# Patient Record
Sex: MALE | Race: WHITE | Employment: OTHER | URBAN - METROPOLITAN AREA
[De-identification: names, ages, dates, MRNs, and addresses within clinical notes are randomized per-mention and may not be internally consistent; named-entity substitution may affect disease eponyms.]

---

## 2017-08-01 ENCOUNTER — HOSPITAL ENCOUNTER (OUTPATIENT)
Dept: ULTRASOUND IMAGING | Age: 77
Discharge: HOME | End: 2017-08-01
Payer: MEDICARE

## 2017-08-01 DIAGNOSIS — Z13.6 SCREENING FOR AAA (ABDOMINAL AORTIC ANEURYSM): ICD-10-CM

## 2017-08-01 PROCEDURE — 76706 US ABDL AORTA SCREEN AAA: CPT

## 2017-08-04 ENCOUNTER — HOSPITAL ENCOUNTER (OUTPATIENT)
Dept: MRI IMAGING | Age: 77
Discharge: HOME | End: 2017-08-04
Attending: ORTHOPAEDIC SURGERY
Payer: MEDICARE

## 2017-08-04 DIAGNOSIS — M48.061 LUMBAR SPINAL STENOSIS: ICD-10-CM

## 2017-08-04 PROCEDURE — 72148 MRI LUMBAR SPINE W/O DYE: CPT

## 2017-08-16 ENCOUNTER — HOSPITAL ENCOUNTER (OUTPATIENT)
Dept: PREADMISSION TESTING | Age: 77
Discharge: HOME | End: 2017-08-16
Attending: ORTHOPAEDIC SURGERY
Payer: MEDICARE

## 2017-08-16 ENCOUNTER — HOSPITAL ENCOUNTER (OUTPATIENT)
Dept: CT IMAGING | Age: 77
Discharge: HOME | End: 2017-08-16
Attending: ORTHOPAEDIC SURGERY
Payer: MEDICARE

## 2017-08-16 VITALS
WEIGHT: 150 LBS | HEART RATE: 58 BPM | HEIGHT: 66 IN | TEMPERATURE: 97.9 F | BODY MASS INDEX: 24.11 KG/M2 | DIASTOLIC BLOOD PRESSURE: 78 MMHG | SYSTOLIC BLOOD PRESSURE: 152 MMHG

## 2017-08-16 DIAGNOSIS — M41.20 IDIOPATHIC SCOLIOSIS: ICD-10-CM

## 2017-08-16 LAB
ABO + RH BLD: NORMAL
ANION GAP SERPL CALC-SCNC: 11 MMOL/L (ref 5–15)
APPEARANCE UR: CLEAR
ATRIAL RATE: 54 BPM
BACTERIA URNS QL MICRO: NEGATIVE /HPF
BILIRUB UR QL: NEGATIVE
BLOOD GROUP ANTIBODIES SERPL: NORMAL
BUN SERPL-MCNC: 9 MG/DL (ref 6–20)
BUN/CREAT SERPL: 12 (ref 12–20)
CALCIUM SERPL-MCNC: 9.1 MG/DL (ref 8.5–10.1)
CALCULATED P AXIS, ECG09: 25 DEGREES
CALCULATED R AXIS, ECG10: 82 DEGREES
CALCULATED T AXIS, ECG11: 16 DEGREES
CHLORIDE SERPL-SCNC: 104 MMOL/L (ref 97–108)
CO2 SERPL-SCNC: 29 MMOL/L (ref 21–32)
COLOR UR: NORMAL
CREAT SERPL-MCNC: 0.78 MG/DL (ref 0.7–1.3)
DIAGNOSIS, 93000: NORMAL
EPITH CASTS URNS QL MICRO: NORMAL /LPF
ERYTHROCYTE [DISTWIDTH] IN BLOOD BY AUTOMATED COUNT: 13.7 % (ref 11.5–14.5)
EST. AVERAGE GLUCOSE BLD GHB EST-MCNC: 105 MG/DL
GLUCOSE SERPL-MCNC: 129 MG/DL (ref 65–100)
GLUCOSE UR STRIP.AUTO-MCNC: NEGATIVE MG/DL
HBA1C MFR BLD: 5.3 % (ref 4.2–6.3)
HCT VFR BLD AUTO: 44.5 % (ref 36.6–50.3)
HGB BLD-MCNC: 15.1 G/DL (ref 12.1–17)
HGB UR QL STRIP: NEGATIVE
HYALINE CASTS URNS QL MICRO: NORMAL /LPF (ref 0–5)
INR PPP: 1.2 (ref 0.9–1.1)
KETONES UR QL STRIP.AUTO: NEGATIVE MG/DL
LEUKOCYTE ESTERASE UR QL STRIP.AUTO: NEGATIVE
MCH RBC QN AUTO: 33.2 PG (ref 26–34)
MCHC RBC AUTO-ENTMCNC: 33.9 G/DL (ref 30–36.5)
MCV RBC AUTO: 97.8 FL (ref 80–99)
NITRITE UR QL STRIP.AUTO: NEGATIVE
P-R INTERVAL, ECG05: 132 MS
PH UR STRIP: 5.5 [PH] (ref 5–8)
PLATELET # BLD AUTO: 133 K/UL (ref 150–400)
POTASSIUM SERPL-SCNC: 3.8 MMOL/L (ref 3.5–5.1)
PROT UR STRIP-MCNC: NEGATIVE MG/DL
PROTHROMBIN TIME: 12.6 SEC (ref 9–11.1)
Q-T INTERVAL, ECG07: 464 MS
QRS DURATION, ECG06: 132 MS
QTC CALCULATION (BEZET), ECG08: 440 MS
RBC # BLD AUTO: 4.55 M/UL (ref 4.1–5.7)
RBC #/AREA URNS HPF: NORMAL /HPF (ref 0–5)
SODIUM SERPL-SCNC: 144 MMOL/L (ref 136–145)
SP GR UR REFRACTOMETRY: 1.02 (ref 1–1.03)
SPECIMEN EXP DATE BLD: NORMAL
UA: UC IF INDICATED,UAUC: NORMAL
UROBILINOGEN UR QL STRIP.AUTO: 1 EU/DL (ref 0.2–1)
VENTRICULAR RATE, ECG03: 54 BPM
WBC # BLD AUTO: 7.4 K/UL (ref 4.1–11.1)
WBC URNS QL MICRO: NORMAL /HPF (ref 0–4)

## 2017-08-16 PROCEDURE — 85027 COMPLETE CBC AUTOMATED: CPT | Performed by: ORTHOPAEDIC SURGERY

## 2017-08-16 PROCEDURE — 86900 BLOOD TYPING SEROLOGIC ABO: CPT | Performed by: ORTHOPAEDIC SURGERY

## 2017-08-16 PROCEDURE — 36415 COLL VENOUS BLD VENIPUNCTURE: CPT | Performed by: ORTHOPAEDIC SURGERY

## 2017-08-16 PROCEDURE — 72131 CT LUMBAR SPINE W/O DYE: CPT

## 2017-08-16 PROCEDURE — 85610 PROTHROMBIN TIME: CPT | Performed by: ORTHOPAEDIC SURGERY

## 2017-08-16 PROCEDURE — 80048 BASIC METABOLIC PNL TOTAL CA: CPT | Performed by: ORTHOPAEDIC SURGERY

## 2017-08-16 PROCEDURE — 81001 URINALYSIS AUTO W/SCOPE: CPT | Performed by: ORTHOPAEDIC SURGERY

## 2017-08-16 PROCEDURE — 83036 HEMOGLOBIN GLYCOSYLATED A1C: CPT | Performed by: ORTHOPAEDIC SURGERY

## 2017-08-16 PROCEDURE — 93005 ELECTROCARDIOGRAM TRACING: CPT

## 2017-08-16 RX ORDER — AMLODIPINE BESYLATE 5 MG/1
TABLET ORAL
Refills: 1 | COMMUNITY
Start: 2017-06-13

## 2017-08-16 RX ORDER — TRAMADOL HYDROCHLORIDE 50 MG/1
TABLET ORAL
Refills: 0 | COMMUNITY
Start: 2017-07-25 | End: 2017-08-27

## 2017-08-16 RX ORDER — IBUPROFEN 600 MG/1
TABLET ORAL
Refills: 0 | COMMUNITY
Start: 2017-07-07 | End: 2017-08-27

## 2017-08-16 NOTE — ADVANCED PRACTICE NURSE
Preoperative instructions reviewed with patient. Patient given 2-6 packs of CHG wipes. Instructions reviewed in class on use of CHG wipes. Patient given SSI infection FAQS sheet, as well as a  MRSA/MSSA treatment instruction sheet  With an explanation to patient that they will be notified if treatment instructions need to be initiated. Patient was given the opportunity to ask questions on the information provided.

## 2017-08-17 LAB
BACTERIA SPEC CULT: NORMAL
BACTERIA SPEC CULT: NORMAL
SERVICE CMNT-IMP: NORMAL

## 2017-08-22 ENCOUNTER — ANESTHESIA EVENT (OUTPATIENT)
Dept: SURGERY | Age: 77
DRG: 457 | End: 2017-08-22
Payer: MEDICARE

## 2017-08-23 ENCOUNTER — HOSPITAL ENCOUNTER (INPATIENT)
Age: 77
LOS: 4 days | Discharge: HOME HEALTH | DRG: 457 | End: 2017-08-27
Attending: ORTHOPAEDIC SURGERY | Admitting: ORTHOPAEDIC SURGERY
Payer: MEDICARE

## 2017-08-23 ENCOUNTER — ANESTHESIA (OUTPATIENT)
Dept: SURGERY | Age: 77
DRG: 457 | End: 2017-08-23
Payer: MEDICARE

## 2017-08-23 ENCOUNTER — APPOINTMENT (OUTPATIENT)
Dept: GENERAL RADIOLOGY | Age: 77
DRG: 457 | End: 2017-08-23
Attending: ORTHOPAEDIC SURGERY
Payer: MEDICARE

## 2017-08-23 PROBLEM — M41.9 SCOLIOSIS: Status: ACTIVE | Noted: 2017-08-23

## 2017-08-23 PROBLEM — M48.061 SPINAL STENOSIS OF LUMBAR REGION: Status: ACTIVE | Noted: 2017-08-23

## 2017-08-23 LAB
GLUCOSE BLD STRIP.AUTO-MCNC: 118 MG/DL (ref 65–100)
SERVICE CMNT-IMP: ABNORMAL

## 2017-08-23 PROCEDURE — 77030034094 HC GRFT BN SUB ELITE TRNTY MUSC -I1: Performed by: ORTHOPAEDIC SURGERY

## 2017-08-23 PROCEDURE — 77030010507 HC ADH SKN DERMBND J&J -B: Performed by: ORTHOPAEDIC SURGERY

## 2017-08-23 PROCEDURE — 77030003152 HC GRFT COLGN DURAL INLC -H: Performed by: ORTHOPAEDIC SURGERY

## 2017-08-23 PROCEDURE — 74011250636 HC RX REV CODE- 250/636: Performed by: ORTHOPAEDIC SURGERY

## 2017-08-23 PROCEDURE — 76210000016 HC OR PH I REC 1 TO 1.5 HR: Performed by: ORTHOPAEDIC SURGERY

## 2017-08-23 PROCEDURE — 76001 XR FLUOROSCOPY OVER 60 MINUTES: CPT

## 2017-08-23 PROCEDURE — 77030026133 HC BN CANC CHP CRSH LIFV -F: Performed by: ORTHOPAEDIC SURGERY

## 2017-08-23 PROCEDURE — 77030012406 HC DRN WND PENRS BARD -A: Performed by: ORTHOPAEDIC SURGERY

## 2017-08-23 PROCEDURE — 77030034475 HC MISC IMPL SPN: Performed by: ORTHOPAEDIC SURGERY

## 2017-08-23 PROCEDURE — 77030003029 HC SUT VCRL J&J -B: Performed by: ORTHOPAEDIC SURGERY

## 2017-08-23 PROCEDURE — 77030008684 HC TU ET CUF COVD -B: Performed by: ANESTHESIOLOGY

## 2017-08-23 PROCEDURE — 74011000272 HC RX REV CODE- 272: Performed by: ORTHOPAEDIC SURGERY

## 2017-08-23 PROCEDURE — 77030014647 HC SEAL FBRN TISSL BAXT -D: Performed by: ORTHOPAEDIC SURGERY

## 2017-08-23 PROCEDURE — 77030032828 HC GRFT DBM FBR VESUVUS 30ML K2M -I1: Performed by: ORTHOPAEDIC SURGERY

## 2017-08-23 PROCEDURE — 77030026438 HC STYL ET INTUB CARD -A: Performed by: ANESTHESIOLOGY

## 2017-08-23 PROCEDURE — 76060000043 HC ANESTHESIA 6 TO 6.5 HR: Performed by: ORTHOPAEDIC SURGERY

## 2017-08-23 PROCEDURE — 77030018836 HC SOL IRR NACL ICUM -A: Performed by: ORTHOPAEDIC SURGERY

## 2017-08-23 PROCEDURE — 77030020061 HC IV BLD WRMR ADMIN SET 3M -B: Performed by: ANESTHESIOLOGY

## 2017-08-23 PROCEDURE — 74011250636 HC RX REV CODE- 250/636

## 2017-08-23 PROCEDURE — 65270000029 HC RM PRIVATE

## 2017-08-23 PROCEDURE — 0SG1071 FUSION OF 2 OR MORE LUMBAR VERTEBRAL JOINTS WITH AUTOLOGOUS TISSUE SUBSTITUTE, POSTERIOR APPROACH, POSTERIOR COLUMN, OPEN APPROACH: ICD-10-PCS | Performed by: ORTHOPAEDIC SURGERY

## 2017-08-23 PROCEDURE — 76010000884 HC OR TIME 6 TO 6.5HR INTENSV - TIER 2: Performed by: ORTHOPAEDIC SURGERY

## 2017-08-23 PROCEDURE — 77030005518 HC CATH URETH FOL 2W BARD -B: Performed by: ORTHOPAEDIC SURGERY

## 2017-08-23 PROCEDURE — 77030013951 HC SEAL TISS ADH DURASL KT INLC -G1: Performed by: ORTHOPAEDIC SURGERY

## 2017-08-23 PROCEDURE — 77030031139 HC SUT VCRL2 J&J -A: Performed by: ORTHOPAEDIC SURGERY

## 2017-08-23 PROCEDURE — 77030004391 HC BUR FLUT MEDT -C: Performed by: ORTHOPAEDIC SURGERY

## 2017-08-23 PROCEDURE — 77030002924 HC SUT GORTX WLGO -B: Performed by: ORTHOPAEDIC SURGERY

## 2017-08-23 PROCEDURE — 30233N0 TRANSFUSION OF AUTOLOGOUS RED BLOOD CELLS INTO PERIPHERAL VEIN, PERCUTANEOUS APPROACH: ICD-10-PCS | Performed by: ORTHOPAEDIC SURGERY

## 2017-08-23 PROCEDURE — 77030011264 HC ELECTRD BLD EXT COVD -A: Performed by: ORTHOPAEDIC SURGERY

## 2017-08-23 PROCEDURE — 77030020782 HC GWN BAIR PAWS FLX 3M -B

## 2017-08-23 PROCEDURE — 07DR3ZZ EXTRACTION OF ILIAC BONE MARROW, PERCUTANEOUS APPROACH: ICD-10-PCS | Performed by: ORTHOPAEDIC SURGERY

## 2017-08-23 PROCEDURE — 82962 GLUCOSE BLOOD TEST: CPT

## 2017-08-23 PROCEDURE — 77030035339 HC CLMP RENSNCE KT DISP MAZR -G1: Performed by: ORTHOPAEDIC SURGERY

## 2017-08-23 PROCEDURE — 77030005529 HC CATH URETH FOL40 BARD -A: Performed by: ORTHOPAEDIC SURGERY

## 2017-08-23 PROCEDURE — 77030019908 HC STETH ESOPH SIMS -A: Performed by: ANESTHESIOLOGY

## 2017-08-23 PROCEDURE — 0SG00AJ FUSION OF LUMBAR VERTEBRAL JOINT WITH INTERBODY FUSION DEVICE, POSTERIOR APPROACH, ANTERIOR COLUMN, OPEN APPROACH: ICD-10-PCS | Performed by: ORTHOPAEDIC SURGERY

## 2017-08-23 PROCEDURE — P9045 ALBUMIN (HUMAN), 5%, 250 ML: HCPCS

## 2017-08-23 PROCEDURE — 72100 X-RAY EXAM L-S SPINE 2/3 VWS: CPT

## 2017-08-23 PROCEDURE — 77030012893

## 2017-08-23 PROCEDURE — 77030020268 HC MISC GENERAL SUPPLY: Performed by: ORTHOPAEDIC SURGERY

## 2017-08-23 PROCEDURE — 77030029099 HC BN WAX SSPC -A: Performed by: ORTHOPAEDIC SURGERY

## 2017-08-23 PROCEDURE — 77030002946 HC SUT NRLN J&J -B: Performed by: ORTHOPAEDIC SURGERY

## 2017-08-23 PROCEDURE — 77030002996 HC SUT SLK J&J -A: Performed by: ORTHOPAEDIC SURGERY

## 2017-08-23 PROCEDURE — 77030032490 HC SLV COMPR SCD KNE COVD -B: Performed by: ORTHOPAEDIC SURGERY

## 2017-08-23 PROCEDURE — C1713 ANCHOR/SCREW BN/BN,TIS/BN: HCPCS | Performed by: ORTHOPAEDIC SURGERY

## 2017-08-23 PROCEDURE — 74011000250 HC RX REV CODE- 250

## 2017-08-23 PROCEDURE — 77030035129: Performed by: ORTHOPAEDIC SURGERY

## 2017-08-23 PROCEDURE — 77030013079 HC BLNKT BAIR HGGR 3M -A: Performed by: ANESTHESIOLOGY

## 2017-08-23 PROCEDURE — 74011250636 HC RX REV CODE- 250/636: Performed by: ANESTHESIOLOGY

## 2017-08-23 PROCEDURE — 74011000250 HC RX REV CODE- 250: Performed by: ORTHOPAEDIC SURGERY

## 2017-08-23 PROCEDURE — 77030012035 HC APPL SEAL MICROMYST KT INLC -C: Performed by: ORTHOPAEDIC SURGERY

## 2017-08-23 PROCEDURE — 77030003666 HC NDL SPINAL BD -A: Performed by: ORTHOPAEDIC SURGERY

## 2017-08-23 PROCEDURE — 8E0W0CZ ROBOTIC ASSISTED PROCEDURE OF TRUNK REGION, OPEN APPROACH: ICD-10-PCS | Performed by: ORTHOPAEDIC SURGERY

## 2017-08-23 DEVICE — GRAFT BNE SUB 30CC DEMIN BNE MTRX FBR FOR OSTEOBIOLOGICAL: Type: IMPLANTABLE DEVICE | Site: SPINE LUMBAR | Status: FUNCTIONAL

## 2017-08-23 DEVICE — 5.5MM CURVED ROD 110MM TI ALLOY
Type: IMPLANTABLE DEVICE | Site: SPINE LUMBAR | Status: FUNCTIONAL
Brand: TAURUS

## 2017-08-23 DEVICE — GRAFT BNE SUB 20CC 2 4MM GRAN GROWTH FACT ALLGRFT OSTEOAMP: Type: IMPLANTABLE DEVICE | Site: SPINE LUMBAR | Status: FUNCTIONAL

## 2017-08-23 DEVICE — BONE CHIP CANC CRSH 1.7-10MM -- READICRAFT: Type: IMPLANTABLE DEVICE | Site: SPINE LUMBAR | Status: FUNCTIONAL

## 2017-08-23 DEVICE — DURAGEN® PLUS DURAL REGENERATION MATRIX, 2 IN X 2 IN (5 CM X 5 CM)
Type: IMPLANTABLE DEVICE | Site: SPINE LUMBAR | Status: FUNCTIONAL
Brand: DURAGEN® PLUS

## 2017-08-23 DEVICE — GRAFT BNE SUB M CANC FRZN MORSELIZED W/ VIABLE CELL TRINITY: Type: IMPLANTABLE DEVICE | Site: SPINE LUMBAR | Status: FUNCTIONAL

## 2017-08-23 RX ORDER — OXYCODONE HYDROCHLORIDE 5 MG/1
10 TABLET ORAL
Status: DISCONTINUED | OUTPATIENT
Start: 2017-08-23 | End: 2017-08-27 | Stop reason: HOSPADM

## 2017-08-23 RX ORDER — LABETALOL HYDROCHLORIDE 5 MG/ML
INJECTION, SOLUTION INTRAVENOUS AS NEEDED
Status: DISCONTINUED | OUTPATIENT
Start: 2017-08-23 | End: 2017-08-23 | Stop reason: HOSPADM

## 2017-08-23 RX ORDER — SUCCINYLCHOLINE CHLORIDE 20 MG/ML
INJECTION INTRAMUSCULAR; INTRAVENOUS AS NEEDED
Status: DISCONTINUED | OUTPATIENT
Start: 2017-08-23 | End: 2017-08-23 | Stop reason: HOSPADM

## 2017-08-23 RX ORDER — ACETAMINOPHEN 325 MG/1
650 TABLET ORAL EVERY 6 HOURS
Status: DISCONTINUED | OUTPATIENT
Start: 2017-08-23 | End: 2017-08-27 | Stop reason: HOSPADM

## 2017-08-23 RX ORDER — FACIAL-BODY WIPES
10 EACH TOPICAL DAILY PRN
Status: DISCONTINUED | OUTPATIENT
Start: 2017-08-25 | End: 2017-08-27 | Stop reason: HOSPADM

## 2017-08-23 RX ORDER — PROPOFOL 10 MG/ML
INJECTION, EMULSION INTRAVENOUS AS NEEDED
Status: DISCONTINUED | OUTPATIENT
Start: 2017-08-23 | End: 2017-08-23 | Stop reason: HOSPADM

## 2017-08-23 RX ORDER — DEXAMETHASONE SODIUM PHOSPHATE 4 MG/ML
INJECTION, SOLUTION INTRA-ARTICULAR; INTRALESIONAL; INTRAMUSCULAR; INTRAVENOUS; SOFT TISSUE AS NEEDED
Status: DISCONTINUED | OUTPATIENT
Start: 2017-08-23 | End: 2017-08-23 | Stop reason: HOSPADM

## 2017-08-23 RX ORDER — HYDRALAZINE HYDROCHLORIDE 20 MG/ML
INJECTION INTRAMUSCULAR; INTRAVENOUS AS NEEDED
Status: DISCONTINUED | OUTPATIENT
Start: 2017-08-23 | End: 2017-08-23 | Stop reason: HOSPADM

## 2017-08-23 RX ORDER — SODIUM CHLORIDE 0.9 % (FLUSH) 0.9 %
5-10 SYRINGE (ML) INJECTION EVERY 8 HOURS
Status: DISCONTINUED | OUTPATIENT
Start: 2017-08-23 | End: 2017-08-23 | Stop reason: HOSPADM

## 2017-08-23 RX ORDER — MIDAZOLAM HYDROCHLORIDE 1 MG/ML
1 INJECTION, SOLUTION INTRAMUSCULAR; INTRAVENOUS
Status: DISCONTINUED | OUTPATIENT
Start: 2017-08-23 | End: 2017-08-23 | Stop reason: HOSPADM

## 2017-08-23 RX ORDER — AMLODIPINE BESYLATE 5 MG/1
5 TABLET ORAL DAILY
Status: DISCONTINUED | OUTPATIENT
Start: 2017-08-24 | End: 2017-08-27 | Stop reason: HOSPADM

## 2017-08-23 RX ORDER — MIDAZOLAM HYDROCHLORIDE 1 MG/ML
1 INJECTION, SOLUTION INTRAMUSCULAR; INTRAVENOUS AS NEEDED
Status: DISCONTINUED | OUTPATIENT
Start: 2017-08-23 | End: 2017-08-23 | Stop reason: HOSPADM

## 2017-08-23 RX ORDER — SODIUM CHLORIDE 0.9 % (FLUSH) 0.9 %
5-10 SYRINGE (ML) INJECTION AS NEEDED
Status: DISCONTINUED | OUTPATIENT
Start: 2017-08-23 | End: 2017-08-27 | Stop reason: HOSPADM

## 2017-08-23 RX ORDER — MORPHINE SULFATE 10 MG/ML
2 INJECTION, SOLUTION INTRAMUSCULAR; INTRAVENOUS
Status: DISCONTINUED | OUTPATIENT
Start: 2017-08-23 | End: 2017-08-23 | Stop reason: HOSPADM

## 2017-08-23 RX ORDER — KETAMINE HYDROCHLORIDE 10 MG/ML
INJECTION, SOLUTION INTRAMUSCULAR; INTRAVENOUS AS NEEDED
Status: DISCONTINUED | OUTPATIENT
Start: 2017-08-23 | End: 2017-08-23 | Stop reason: HOSPADM

## 2017-08-23 RX ORDER — LIDOCAINE HYDROCHLORIDE 20 MG/ML
INJECTION, SOLUTION EPIDURAL; INFILTRATION; INTRACAUDAL; PERINEURAL AS NEEDED
Status: DISCONTINUED | OUTPATIENT
Start: 2017-08-23 | End: 2017-08-23 | Stop reason: HOSPADM

## 2017-08-23 RX ORDER — ONDANSETRON 2 MG/ML
4 INJECTION INTRAMUSCULAR; INTRAVENOUS
Status: ACTIVE | OUTPATIENT
Start: 2017-08-23 | End: 2017-08-24

## 2017-08-23 RX ORDER — SODIUM CHLORIDE 0.9 % (FLUSH) 0.9 %
5-10 SYRINGE (ML) INJECTION AS NEEDED
Status: DISCONTINUED | OUTPATIENT
Start: 2017-08-23 | End: 2017-08-23 | Stop reason: HOSPADM

## 2017-08-23 RX ORDER — HYDROMORPHONE HYDROCHLORIDE 1 MG/ML
INJECTION, SOLUTION INTRAMUSCULAR; INTRAVENOUS; SUBCUTANEOUS AS NEEDED
Status: DISCONTINUED | OUTPATIENT
Start: 2017-08-23 | End: 2017-08-23 | Stop reason: HOSPADM

## 2017-08-23 RX ORDER — SODIUM CHLORIDE 0.9 % (FLUSH) 0.9 %
5-10 SYRINGE (ML) INJECTION EVERY 8 HOURS
Status: DISCONTINUED | OUTPATIENT
Start: 2017-08-24 | End: 2017-08-27 | Stop reason: HOSPADM

## 2017-08-23 RX ORDER — ATENOLOL 50 MG/1
50 TABLET ORAL DAILY
Status: DISCONTINUED | OUTPATIENT
Start: 2017-08-24 | End: 2017-08-27 | Stop reason: HOSPADM

## 2017-08-23 RX ORDER — CEFAZOLIN SODIUM IN 0.9 % NACL 2 G/50 ML
2 INTRAVENOUS SOLUTION, PIGGYBACK (ML) INTRAVENOUS ONCE
Status: COMPLETED | OUTPATIENT
Start: 2017-08-23 | End: 2017-08-23

## 2017-08-23 RX ORDER — CYCLOBENZAPRINE HCL 10 MG
10 TABLET ORAL
Status: DISCONTINUED | OUTPATIENT
Start: 2017-08-23 | End: 2017-08-27 | Stop reason: HOSPADM

## 2017-08-23 RX ORDER — OXYCODONE HYDROCHLORIDE 5 MG/1
5 TABLET ORAL AS NEEDED
Status: DISCONTINUED | OUTPATIENT
Start: 2017-08-23 | End: 2017-08-23 | Stop reason: HOSPADM

## 2017-08-23 RX ORDER — HYDROMORPHONE HYDROCHLORIDE 1 MG/ML
0.2 INJECTION, SOLUTION INTRAMUSCULAR; INTRAVENOUS; SUBCUTANEOUS
Status: DISCONTINUED | OUTPATIENT
Start: 2017-08-23 | End: 2017-08-23 | Stop reason: HOSPADM

## 2017-08-23 RX ORDER — CEFAZOLIN SODIUM IN 0.9 % NACL 2 G/50 ML
2 INTRAVENOUS SOLUTION, PIGGYBACK (ML) INTRAVENOUS EVERY 8 HOURS
Status: COMPLETED | OUTPATIENT
Start: 2017-08-23 | End: 2017-08-24

## 2017-08-23 RX ORDER — OXYCODONE HYDROCHLORIDE 5 MG/1
5 TABLET ORAL
Status: DISCONTINUED | OUTPATIENT
Start: 2017-08-23 | End: 2017-08-27 | Stop reason: HOSPADM

## 2017-08-23 RX ORDER — MIDAZOLAM HYDROCHLORIDE 1 MG/ML
INJECTION, SOLUTION INTRAMUSCULAR; INTRAVENOUS AS NEEDED
Status: DISCONTINUED | OUTPATIENT
Start: 2017-08-23 | End: 2017-08-23 | Stop reason: HOSPADM

## 2017-08-23 RX ORDER — DEXTROSE, SODIUM CHLORIDE, SODIUM LACTATE, POTASSIUM CHLORIDE, AND CALCIUM CHLORIDE 5; .6; .31; .03; .02 G/100ML; G/100ML; G/100ML; G/100ML; G/100ML
125 INJECTION, SOLUTION INTRAVENOUS CONTINUOUS
Status: DISCONTINUED | OUTPATIENT
Start: 2017-08-23 | End: 2017-08-23 | Stop reason: HOSPADM

## 2017-08-23 RX ORDER — SODIUM CHLORIDE 9 MG/ML
125 INJECTION, SOLUTION INTRAVENOUS CONTINUOUS
Status: DISPENSED | OUTPATIENT
Start: 2017-08-23 | End: 2017-08-24

## 2017-08-23 RX ORDER — BUPIVACAINE HYDROCHLORIDE 5 MG/ML
INJECTION, SOLUTION EPIDURAL; INTRACAUDAL AS NEEDED
Status: DISCONTINUED | OUTPATIENT
Start: 2017-08-23 | End: 2017-08-23 | Stop reason: HOSPADM

## 2017-08-23 RX ORDER — SODIUM CHLORIDE, SODIUM LACTATE, POTASSIUM CHLORIDE, CALCIUM CHLORIDE 600; 310; 30; 20 MG/100ML; MG/100ML; MG/100ML; MG/100ML
125 INJECTION, SOLUTION INTRAVENOUS CONTINUOUS
Status: DISCONTINUED | OUTPATIENT
Start: 2017-08-23 | End: 2017-08-23 | Stop reason: HOSPADM

## 2017-08-23 RX ORDER — AMOXICILLIN 250 MG
1 CAPSULE ORAL 2 TIMES DAILY
Status: DISCONTINUED | OUTPATIENT
Start: 2017-08-23 | End: 2017-08-27 | Stop reason: HOSPADM

## 2017-08-23 RX ORDER — NALOXONE HYDROCHLORIDE 0.4 MG/ML
0.4 INJECTION, SOLUTION INTRAMUSCULAR; INTRAVENOUS; SUBCUTANEOUS AS NEEDED
Status: DISCONTINUED | OUTPATIENT
Start: 2017-08-23 | End: 2017-08-27 | Stop reason: HOSPADM

## 2017-08-23 RX ORDER — LIDOCAINE HYDROCHLORIDE 10 MG/ML
0.5 INJECTION, SOLUTION EPIDURAL; INFILTRATION; INTRACAUDAL; PERINEURAL AS NEEDED
Status: DISCONTINUED | OUTPATIENT
Start: 2017-08-23 | End: 2017-08-23 | Stop reason: HOSPADM

## 2017-08-23 RX ORDER — PRAVASTATIN SODIUM 40 MG/1
40 TABLET ORAL
Status: DISCONTINUED | OUTPATIENT
Start: 2017-08-23 | End: 2017-08-27 | Stop reason: HOSPADM

## 2017-08-23 RX ORDER — ONDANSETRON 2 MG/ML
INJECTION INTRAMUSCULAR; INTRAVENOUS AS NEEDED
Status: DISCONTINUED | OUTPATIENT
Start: 2017-08-23 | End: 2017-08-23 | Stop reason: HOSPADM

## 2017-08-23 RX ORDER — DIPHENHYDRAMINE HYDROCHLORIDE 50 MG/ML
12.5 INJECTION, SOLUTION INTRAMUSCULAR; INTRAVENOUS AS NEEDED
Status: DISCONTINUED | OUTPATIENT
Start: 2017-08-23 | End: 2017-08-23 | Stop reason: HOSPADM

## 2017-08-23 RX ORDER — SODIUM CHLORIDE 9 MG/ML
INJECTION, SOLUTION INTRAVENOUS
Status: DISCONTINUED | OUTPATIENT
Start: 2017-08-23 | End: 2017-08-23 | Stop reason: HOSPADM

## 2017-08-23 RX ORDER — PROPOFOL 10 MG/ML
INJECTION, EMULSION INTRAVENOUS
Status: DISCONTINUED | OUTPATIENT
Start: 2017-08-23 | End: 2017-08-23 | Stop reason: HOSPADM

## 2017-08-23 RX ORDER — DIPHENHYDRAMINE HCL 12.5MG/5ML
12.5 ELIXIR ORAL
Status: ACTIVE | OUTPATIENT
Start: 2017-08-23 | End: 2017-08-24

## 2017-08-23 RX ORDER — GLYCOPYRROLATE 0.2 MG/ML
INJECTION INTRAMUSCULAR; INTRAVENOUS AS NEEDED
Status: DISCONTINUED | OUTPATIENT
Start: 2017-08-23 | End: 2017-08-23 | Stop reason: HOSPADM

## 2017-08-23 RX ORDER — FENTANYL CITRATE 50 UG/ML
INJECTION, SOLUTION INTRAMUSCULAR; INTRAVENOUS AS NEEDED
Status: DISCONTINUED | OUTPATIENT
Start: 2017-08-23 | End: 2017-08-23 | Stop reason: HOSPADM

## 2017-08-23 RX ORDER — ALBUMIN HUMAN 50 G/1000ML
SOLUTION INTRAVENOUS AS NEEDED
Status: DISCONTINUED | OUTPATIENT
Start: 2017-08-23 | End: 2017-08-23 | Stop reason: HOSPADM

## 2017-08-23 RX ORDER — POLYETHYLENE GLYCOL 3350 17 G/17G
17 POWDER, FOR SOLUTION ORAL DAILY
Status: DISCONTINUED | OUTPATIENT
Start: 2017-08-24 | End: 2017-08-27 | Stop reason: HOSPADM

## 2017-08-23 RX ORDER — SODIUM CHLORIDE, SODIUM LACTATE, POTASSIUM CHLORIDE, CALCIUM CHLORIDE 600; 310; 30; 20 MG/100ML; MG/100ML; MG/100ML; MG/100ML
INJECTION, SOLUTION INTRAVENOUS
Status: DISCONTINUED | OUTPATIENT
Start: 2017-08-23 | End: 2017-08-23 | Stop reason: HOSPADM

## 2017-08-23 RX ORDER — HYDROMORPHONE HCL IN 0.9% NACL 15 MG/30ML
PATIENT CONTROLLED ANALGESIA VIAL INTRAVENOUS CONTINUOUS
Status: DISCONTINUED | OUTPATIENT
Start: 2017-08-23 | End: 2017-08-24

## 2017-08-23 RX ORDER — FENTANYL CITRATE 50 UG/ML
25 INJECTION, SOLUTION INTRAMUSCULAR; INTRAVENOUS
Status: DISCONTINUED | OUTPATIENT
Start: 2017-08-23 | End: 2017-08-23 | Stop reason: HOSPADM

## 2017-08-23 RX ORDER — ROCURONIUM BROMIDE 10 MG/ML
INJECTION, SOLUTION INTRAVENOUS AS NEEDED
Status: DISCONTINUED | OUTPATIENT
Start: 2017-08-23 | End: 2017-08-23 | Stop reason: HOSPADM

## 2017-08-23 RX ORDER — FENTANYL CITRATE 50 UG/ML
50 INJECTION, SOLUTION INTRAMUSCULAR; INTRAVENOUS AS NEEDED
Status: DISCONTINUED | OUTPATIENT
Start: 2017-08-23 | End: 2017-08-23 | Stop reason: HOSPADM

## 2017-08-23 RX ORDER — ONDANSETRON 2 MG/ML
4 INJECTION INTRAMUSCULAR; INTRAVENOUS AS NEEDED
Status: DISCONTINUED | OUTPATIENT
Start: 2017-08-23 | End: 2017-08-23 | Stop reason: HOSPADM

## 2017-08-23 RX ADMIN — HYDROMORPHONE HYDROCHLORIDE 0.5 MG: 1 INJECTION, SOLUTION INTRAMUSCULAR; INTRAVENOUS; SUBCUTANEOUS at 13:55

## 2017-08-23 RX ADMIN — PROPOFOL 50 MG: 10 INJECTION, EMULSION INTRAVENOUS at 11:30

## 2017-08-23 RX ADMIN — SUCCINYLCHOLINE CHLORIDE 140 MG: 20 INJECTION INTRAMUSCULAR; INTRAVENOUS at 11:08

## 2017-08-23 RX ADMIN — KETAMINE HYDROCHLORIDE 25 MG: 10 INJECTION, SOLUTION INTRAMUSCULAR; INTRAVENOUS at 16:00

## 2017-08-23 RX ADMIN — Medication: at 17:55

## 2017-08-23 RX ADMIN — HYDROMORPHONE HYDROCHLORIDE 0.25 MG: 1 INJECTION, SOLUTION INTRAMUSCULAR; INTRAVENOUS; SUBCUTANEOUS at 16:00

## 2017-08-23 RX ADMIN — FENTANYL CITRATE 50 MCG: 50 INJECTION, SOLUTION INTRAMUSCULAR; INTRAVENOUS at 11:15

## 2017-08-23 RX ADMIN — HYDROMORPHONE HYDROCHLORIDE 1 MG: 1 INJECTION, SOLUTION INTRAMUSCULAR; INTRAVENOUS; SUBCUTANEOUS at 12:03

## 2017-08-23 RX ADMIN — SODIUM CHLORIDE 125 ML/HR: 900 INJECTION, SOLUTION INTRAVENOUS at 17:50

## 2017-08-23 RX ADMIN — SODIUM CHLORIDE: 9 INJECTION, SOLUTION INTRAVENOUS at 13:55

## 2017-08-23 RX ADMIN — PROPOFOL 130 MG: 10 INJECTION, EMULSION INTRAVENOUS at 11:08

## 2017-08-23 RX ADMIN — LIDOCAINE HYDROCHLORIDE 40 MG: 20 INJECTION, SOLUTION EPIDURAL; INFILTRATION; INTRACAUDAL; PERINEURAL at 11:07

## 2017-08-23 RX ADMIN — DEXAMETHASONE SODIUM PHOSPHATE 4 MG: 4 INJECTION, SOLUTION INTRA-ARTICULAR; INTRALESIONAL; INTRAMUSCULAR; INTRAVENOUS; SOFT TISSUE at 11:17

## 2017-08-23 RX ADMIN — HYDRALAZINE HYDROCHLORIDE 10 MG: 20 INJECTION INTRAMUSCULAR; INTRAVENOUS at 14:31

## 2017-08-23 RX ADMIN — PROPOFOL 50 MCG/KG/MIN: 10 INJECTION, EMULSION INTRAVENOUS at 11:35

## 2017-08-23 RX ADMIN — SODIUM CHLORIDE, SODIUM LACTATE, POTASSIUM CHLORIDE, AND CALCIUM CHLORIDE 125 ML/HR: 600; 310; 30; 20 INJECTION, SOLUTION INTRAVENOUS at 10:38

## 2017-08-23 RX ADMIN — CEFAZOLIN 2 G: 1 INJECTION, POWDER, FOR SOLUTION INTRAMUSCULAR; INTRAVENOUS; PARENTERAL at 14:54

## 2017-08-23 RX ADMIN — SODIUM CHLORIDE, SODIUM LACTATE, POTASSIUM CHLORIDE, CALCIUM CHLORIDE: 600; 310; 30; 20 INJECTION, SOLUTION INTRAVENOUS at 12:55

## 2017-08-23 RX ADMIN — ALBUMIN HUMAN 250 ML: 50 SOLUTION INTRAVENOUS at 16:08

## 2017-08-23 RX ADMIN — FENTANYL CITRATE 50 MCG: 50 INJECTION, SOLUTION INTRAMUSCULAR; INTRAVENOUS at 10:59

## 2017-08-23 RX ADMIN — CEFAZOLIN 2 G: 1 INJECTION, POWDER, FOR SOLUTION INTRAMUSCULAR; INTRAVENOUS; PARENTERAL at 11:15

## 2017-08-23 RX ADMIN — CEFAZOLIN 2 G: 1 INJECTION, POWDER, FOR SOLUTION INTRAMUSCULAR; INTRAVENOUS; PARENTERAL at 23:42

## 2017-08-23 RX ADMIN — HYDROMORPHONE HYDROCHLORIDE 0.5 MG: 1 INJECTION, SOLUTION INTRAMUSCULAR; INTRAVENOUS; SUBCUTANEOUS at 13:45

## 2017-08-23 RX ADMIN — SODIUM CHLORIDE: 9 INJECTION, SOLUTION INTRAVENOUS at 11:33

## 2017-08-23 RX ADMIN — GLYCOPYRROLATE 0.1 MG: 0.2 INJECTION INTRAMUSCULAR; INTRAVENOUS at 11:53

## 2017-08-23 RX ADMIN — GLYCOPYRROLATE 0.1 MG: 0.2 INJECTION INTRAMUSCULAR; INTRAVENOUS at 11:56

## 2017-08-23 RX ADMIN — MIDAZOLAM HYDROCHLORIDE 2 MG: 1 INJECTION, SOLUTION INTRAMUSCULAR; INTRAVENOUS at 10:59

## 2017-08-23 RX ADMIN — FENTANYL CITRATE 50 MCG: 50 INJECTION, SOLUTION INTRAMUSCULAR; INTRAVENOUS at 11:07

## 2017-08-23 RX ADMIN — ROCURONIUM BROMIDE 5 MG: 10 INJECTION, SOLUTION INTRAVENOUS at 11:08

## 2017-08-23 RX ADMIN — ONDANSETRON 4 MG: 2 INJECTION INTRAMUSCULAR; INTRAVENOUS at 11:17

## 2017-08-23 RX ADMIN — SODIUM CHLORIDE 125 ML/HR: 900 INJECTION, SOLUTION INTRAVENOUS at 22:38

## 2017-08-23 RX ADMIN — FENTANYL CITRATE 50 MCG: 50 INJECTION, SOLUTION INTRAMUSCULAR; INTRAVENOUS at 11:30

## 2017-08-23 RX ADMIN — FENTANYL CITRATE 50 MCG: 50 INJECTION, SOLUTION INTRAMUSCULAR; INTRAVENOUS at 14:51

## 2017-08-23 RX ADMIN — LABETALOL HYDROCHLORIDE 10 MG: 5 INJECTION, SOLUTION INTRAVENOUS at 14:13

## 2017-08-23 RX ADMIN — SODIUM CHLORIDE, SODIUM LACTATE, POTASSIUM CHLORIDE, CALCIUM CHLORIDE: 600; 310; 30; 20 INJECTION, SOLUTION INTRAVENOUS at 10:59

## 2017-08-23 RX ADMIN — PROPOFOL: 10 INJECTION, EMULSION INTRAVENOUS at 13:45

## 2017-08-23 NOTE — IP AVS SNAPSHOT
[address] 
[phone] Patient: [employer] MRN: [id number] PPP:72/20/8803 Current Discharge Medication List  
  
START taking these medications Dose & Instructions Dispensing Information Comments Morning Noon Evening Bedtime HYDROcodone-acetaminophen 5-325 mg per tablet Commonly known as:  Unruly Andrewsist Your last dose was: Your next dose is:    
   
   
 Dose:  1 Tab Take 1 Tab by mouth every four (4) hours as needed for Pain. Max Daily Amount: 6 Tabs. Quantity:  40 Tab Refills:  0 CONTINUE these medications which have CHANGED Dose & Instructions Dispensing Information Comments Morning Noon Evening Bedtime  
 traMADol 50 mg tablet Commonly known as:  ULTRAM  
What changed:  See the new instructions. Your last dose was: Your next dose is:    
   
   
 Dose:   mg Take 1-2 Tabs by mouth every six (6) hours as needed. Max Daily Amount: 400 mg. Quantity:  50 Tab Refills:  0 CONTINUE these medications which have NOT CHANGED Dose & Instructions Dispensing Information Comments Morning Noon Evening Bedtime  
 amLODIPine 5 mg tablet Commonly known as:  Achilles Richardson Your last dose was: Your next dose is: TAKE 1 TABLET BY MOUTH EVERY DAY Refills:  1  
     
   
   
   
  
 atenolol 50 mg tablet Commonly known as:  TENORMIN Your last dose was: Your next dose is:    
   
   
 Dose:  50 mg Take 50 mg by mouth daily. Refills:  0  
     
   
   
   
  
 pravastatin 40 mg tablet Commonly known as:  PRAVACHOL Your last dose was: Your next dose is:    
   
   
 Dose:  40 mg Take 40 mg by mouth nightly. Refills:  0 STOP taking these medications   
 aspirin delayed-release 81 mg tablet  
   
  
 ibuprofen 600 mg tablet Commonly known as:  MOTRIN Where to Get Your Medications Information on where to get these meds will be given to you by the nurse or doctor. ! Ask your nurse or doctor about these medications HYDROcodone-acetaminophen 5-325 mg per tablet  
 traMADol 50 mg tablet

## 2017-08-23 NOTE — PERIOP NOTES
TRANSFER - OUT REPORT:    Verbal report given to Sejal(name) on [employer]  being transferred to [facility](unit) for routine post - op       Report consisted of patients Situation, Background, Assessment and   Recommendations(SBAR). Time Pre op antibiotic given:1115, 1296  Anesthesia Stop time: 8106  Mccracken Present on Transfer to floor:yes  Order for Mccracken on Chart:yes    Information from the following report(s) Procedure Summary and Accordion was reviewed with the receiving nurse. Opportunity for questions and clarification was provided. Is the patient on 02? YES              Other 100% NRB    Is the patient on a monitor? NO    Is the nurse transporting with the patient? NO    Surgical Waiting Area notified of patient's transfer from PACU?  NO      The following personal items collected during your admission accompanied patient upon transfer:   Dental Appliance: Dental Appliances: None  Vision:    Hearing Aid:    Jewelry:    Clothing: Clothing:  (clothes and back brace to pacu)  Other Valuables:    Valuables sent to safe:    Clothes and back brace returned to pt

## 2017-08-23 NOTE — PERIOP NOTES
Spoke with patients family for surgical update. Informed family that I had tried to contact them twice prior. Family stated the volunteers at the surgical waiting desk had the wrong beeper number for family.

## 2017-08-23 NOTE — IP AVS SNAPSHOT
[address] 
[phone] Patient: Magali Dunlap Libboo MRN: [id number] ASTER:16/49/0[birth year] You are allergic to the following No active allergies Recent Documentation Height Weight BMI Smoking Status 1.676 m 68 kg 24.21 kg/m2 Current Some Day Smoker Emergency Contacts Name Discharge Info Relation Home Work Mobile 75 Irvin Lozano CAREGIVER [3] Spouse [3] [phone] About your hospitalization You were admitted on:  August 23, 2017 You last received care in the:  [address] [place] [address] You were discharged on:  August 27, 2017 Unit phone number:  [phone] Why you were hospitalized Your primary diagnosis was:  Not on File Your diagnoses also included:  Spinal Stenosis Of Lumbar Region, Scoliosis Providers Seen During Your Hospitalizations Provider Role Specialty Primary office phone Rehan Wagoner MD Attending Provider Orthopedic Surgery [phone] Your Primary Care Physician (PCP) Primary Care Physician Office Phone Office Fax Nicolasa Damon [phone] Follow-up Information Follow up With Details Comments Contact Info AT [facility] [id number] 
[phone] Markel Gtz, [address] [place] [address] [address] [place] [address] 
[phone] Current Discharge Medication List  
  
START taking these medications Dose & Instructions Dispensing Information Comments Morning Noon Evening Bedtime HYDROcodone-acetaminophen 5-325 mg per tablet Commonly known as:  Rhenda Gasper Your last dose was: Your next dose is:    
   
   
 Dose:  1 Tab Take 1 Tab by mouth every four (4) hours as needed for Pain. Max Daily Amount: 6 Tabs. Quantity:  40 Tab Refills:  0 CONTINUE these medications which have CHANGED Dose & Instructions Dispensing Information Comments Morning Noon Evening Bedtime  
 traMADol 50 mg tablet Commonly known as:  ULTRAM  
What changed:  See the new instructions. Your last dose was: Your next dose is:    
   
   
 Dose:   mg Take 1-2 Tabs by mouth every six (6) hours as needed. Max Daily Amount: 400 mg. Quantity:  50 Tab Refills:  0 CONTINUE these medications which have NOT CHANGED Dose & Instructions Dispensing Information Comments Morning Noon Evening Bedtime  
 amLODIPine 5 mg tablet Commonly known as:  Jessica Chimes Your last dose was: Your next dose is: TAKE 1 TABLET BY MOUTH EVERY DAY Refills:  1  
     
   
   
   
  
 atenolol 50 mg tablet Commonly known as:  TENORMIN Your last dose was: Your next dose is:    
   
   
 Dose:  50 mg Take 50 mg by mouth daily. Refills:  0  
     
   
   
   
  
 pravastatin 40 mg tablet Commonly known as:  PRAVACHOL Your last dose was: Your next dose is:    
   
   
 Dose:  40 mg Take 40 mg by mouth nightly. Refills:  0 STOP taking these medications   
 aspirin delayed-release 81 mg tablet  
   
  
 ibuprofen 600 mg tablet Commonly known as:  MOTRIN Where to Get Your Medications Information on where to get these meds will be given to you by the nurse or doctor. ! Ask your nurse or doctor about these medications HYDROcodone-acetaminophen 5-325 mg per tablet  
 traMADol 50 mg tablet Discharge Instructions Patient meets criteria for BUNDLED PAYMENT  
for Care Improvement Initiative Criteria Contact Information for Orthopedic Nurse Navigator:     
SANTO Rodriguez, RN-BC 
U:[phone] I:[phone] E:[phone] After Hospital Care Plan:  Discharge Instructions Lumbar Fusion Surgery Dr. Steffanie Aguirre Patient Name: [facility] SAHNIKA Date of procedure: 8/23/2017 Date of discharge:  
 
Procedure: Procedure(s): 
ROBOTIC ASSISTED L1-L5 DECOMPRESSION FUSION (MAZOR)  (OXYGEN)  PCP: Beacher Canavan, MD 
 
 
Follow up appointments 
-follow up with Dr. Steffanie Aguirre in 2 weeks. Call [phone] to make an appointment as soon as you get home from the hospital. 
 
When to call your Orthopaedic Surgeon: 
-Signs of infection-if your incision is red; continues to have drainage; drainage has a foul odor or if you have a persistent fever over 101 degrees for 24 hours 
-Nausea or vomiting, severe headache 
-Loss of bowel or bladder function, inability to urinate 
-Changes in sensation in your arms or legs (numbness, tingling, loss of color) -Increased weakness-greater than before your surgery 
-Severe pain or pain not relieved by medications 
-Signs of a blood clot in your leg-calf pain, tenderness, redness, swelling of lower leg When to call your Primary Care Physician: 
-Concerns about medical conditions such as diabetes, high blood pressure, asthma, congestive heart failure 
-Call if blood sugars are elevated, persistent headache or dizziness, coughing or congestion, constipation or diarrhea, burning with urination, abnormal heart rate When to call 139 and go to the nearest emergency room: 
-Acute onset of chest pain, shortness of breath, difficulty breathing Activity 
-You are going home a well person, be as active as possible. Your only exercise should be walking. Start with short frequent walks and increase your walking distance each day. 
-Limit the amount of time you sit to 20-30 minute intervals. Sitting for prolonged periods of time will be uncomfortable for you following surgery. 
-Do NOT lift anything over 5 pounds 
-Do NOT do any straining, twisting or bending 
-When you are in bed, you may lay on your back or on either side. Do NOT lie on your stomach Brace -If you have a back brace, you should wear your brace at all times when you are out of bed. Do not wear the brace while in bed or showering. 
-Remember to always wear a cotton t-shirt underneath your brace. 
-Do not bend or twist when your brace is off Diet 
-Resume usual diet; drink plenty of fluids; eat foods high in fiber 
-It is important to have regular bowel movements. Pain medications may cause constipation. You may want to take a stool softener (such as Senokot-S or Colace) to prevent constipation. 
 -If constipation occurs, take a laxative (such as Dulcolax tablets, Milk of Magnesia, or a suppository). Laxatives should only be used if the above preventable measures have failed and you still have not had a bowel movement after three days Driving 
-You may not drive or return to work until instructed by your physician. However, you may ride in the car for short periods of time. Incision Care 
-You may take brief showers but do not run the water run directly onto the wound. After showering or bathing, remove the wet dressing and gently blot the wound dry with a soft towel. 
-Do not rub or apply any lotions or ointments to your incision site.  
-Do not soak or scrub your wound 
-Keep a dry dressing (ABD and paper tape) on your incision and have it changed daily for 14 days after surgery; more often if your incision is draining. Have your caregiver wash their hands thoroughly before changing your dressing. 
-You will have absorbable sutures and steristrips (white tape) on your incision. Leave the steristrips on until they fall off. Showering 
-You may shower in approximately 4 days after your surgery.   
-Leave the dressing on during your shower. Do NOT allow the water to run directly onto your dressing. Once you get out of the shower, put on a dry dressing. 
-Reminder- your brace can be removed while showering. Remember to not bend or twist while your brace is off. -Do not take a tub bath. Preventing blood clots 
-You have been given T.E.D. stockings to wear. Continue to wear these for 7 days after your discharge. Put them on in the morning and take them off at night.   
-They are used to increase your circulation and prevent blood clots from forming in your legs 
-T. E.D. stockings can be machine washed, temperature not to exceed 160° F (71°C) and machine dried for 15 to 20 minutes, temperature not to exceed 250° F (121°C). Pain management 
-Take pain medication as prescribed; decrease the amount you use as your pain lessens 
-DO not wait until you are in extreme pain to take your medication. 
-Avoid alcoholic beverages while taking pain medication Pain Medication Safety DO: 
-Read the Medication Guide  
-Take your medicine exactly as prescribed  
-Store your medicine away from children and in a safe place  
-Flush unused medicine down the toilet  
-Call your healthcare provider for medical advice about side effects. You may report side effects to FDA at 1-878-FDA-7520.  
-Please be aware that many medications contain Tylenol. We do not want you to over medicate so please read the information below as a guide. Do not take more than 4 Grams of Tylenol in a 24 hour period. (There are 1000 milligrams in one Gram) Percocet contains 325 mg of Tylenol per tablet (do not take more than 12 tablets in 24 hours) Lortab contains 500 mg of Tylenol per tablet (do not take more than 8 tablets in 24 hours) Norco contains 325 mg of Tylenol per tablet (do not take more than 12 tablets in 24 hours).  
DO NOT: 
 -Do not give your medicine to others  
-Do not take medicine unless it was prescribed for you  
-Do not stop taking your medicine without talking to your healthcare provider  
-Do not break, chew, crush, dissolve, or inject your medicine. If you cannot swallow your medicine whole, talk to your healthcare provider. 
-Do not drink alcohol while taking this medicine 
-Do not take anti-inflammatory medications or aspirin unless instructed by your      Physician. Discharge Orders None Introducing [facility] [place] [facility] SERVICES! Jarocho Rubi introduces [employer] patient portal. Now you can access parts of your medical record, email your doctor's office, and request medication refills online. 1. In your internet browser, go to https://[employer] 2. Click on the First Time User? Click Here link in the Sign In box. You will see the New Member Sign Up page. 3. Enter your [employer] Access Code exactly as it appears below. You will not need to use this code after youve completed the sign-up process. If you do not sign up before the expiration date, you must request a new code. · [employer] Access Code: [email or web address] Expires: 10/25/2017  9:52 AM 
 
4. Enter the last four digits of your Social Security Number (xxxx) and Date of Birth (mm/dd/yyyy) as indicated and click Submit. You will be taken to the next sign-up page. 5. Create a [employer] ID. This will be your [employer] login ID and cannot be changed, so think of one that is secure and easy to remember. 6. Create a [employer] password. You can change your password at any time. 7. Enter your Password Reset Question and Answer. This can be used at a later time if you forget your password. 8. Enter your e-mail address. You will receive e-mail notification when new information is available in [address] 9. Click Sign Up. You can now view and download portions of your medical record. 10. Click the Download Summary menu link to download a portable copy of your medical information. If you have questions, please visit the Frequently Asked Questions section of the [employer] website. Remember, MyChart is NOT to be used for urgent needs. For medical emergencies, dial 911. Now available from your iPhone and Android! General Information Please provide this summary of care documentation to your next provider. Patient Signature:  ____________________________________________________________ Date:  ____________________________________________________________  
  
Mereta Mince Provider Signature:  ____________________________________________________________ Date:  ____________________________________________________________

## 2017-08-23 NOTE — BRIEF OP NOTE
BRIEF OPERATIVE NOTE    Date of Procedure: 8/23/2017   Preoperative Diagnosis: SCOLIOSIS   LUMBAR STENOSIS   Postoperative Diagnosis: SCOLIOSIS   LUMBAR STENOSIS     Procedure(s):  ROBOTIC ASSISTED L1-L5 DECOMPRESSION FUSION (MAZOR)  (OXYGEN)  Surgeon(s) and Role: Flaca Valdez MD - Primary         Assistant Staff:  Nurse Practitioner: Мария Grossman NP    Surgical Staff:  Circ-1: Aman Mckeon RN  Circ-Relief: Derek Linares RN  Radiology Technician: Brea Durham RT  Scrub RN-1: Eileen Webster RN  Scrub RN-Relief: Shanita Aguilera RN  Nurse Practitioner: Мария Grossman NP  Event Time In   Incision Start 1141   Incision Close      Anesthesia: General   Estimated Blood Loss: 750cc  Specimens: * No specimens in log *   Findings: stenosis   Complications: bleb in dura  Implants:   Implant Name Type Inv.  Item Serial No.  Lot No. LRB No. Used Action   BONE CHIP CANC [address] 1.7-10MM -- [employer]FT - [id number]  BONE CHIP CANC [address] 1.7-10MM -- READICRAFT [id number] [facility] T[facility] NA N/A 1 Implanted   GRAFT BNE [employer] --  - [id number]  GRAFT BNE [employer] --  [id number] MUSCULOSKELETAL TRANS NA N/A 1 Implanted   GRAFT FIBER DEMINERALIZED 30ML -- VESUVIUS FIBERS - [id number]  GRAFT FIBER DEMINERALIZED 30ML -- VESUVIUS FIBERS [id number] [facility] NA N/A 1 Implanted   GRAFT DURA REGEN MATRX 2X2 5PK -- DURAGEN PLUS ORDER EA 5/EA - SNA  GRAFT DURA REGEN MATRX 2X2 5PK -- DURAGEN PLUS ORDER EA 5/EA NA [employer] MAURO [id number] N/A 1 Implanted   6.5X45 SCREW [employer] Screw  NA [employer] US SPINE NA N/A 1 Implanted   5.5X45 SCREW [employer] Screw  NA [employer] SPINE NA N/A 1 Implanted   5.5X50 SCREW [employer] Screw  NA [employer] US SPINE NA N/A 3 Implanted   6.5X50 [employer] SCREW Screw  NA [employer] US SPINE NA N/A 5 Implanted   AMEDICA REDUCTION HEAD SCREWS Screw  NA [employer] US SPINE NA N/A 10 Implanted   AMEDICA SET SCREWS Screw  NA [employer] US SPINE NA N/A 10 Implanted   VI NM [id number] LORDOTIC IMPLANT Other  NA SEASPINE [id number] N/A 1 Implanted   GRAFT BNE GRAN DBM 2-4MM 20ML -- OSTEOAMP - RUKS--0030  GRAFT BNE GRAN DBM 2-4MM 20ML -- OSTEOAMP HEZ--0030 [employer] NA N/A 1 Implanted   JAMES SPNE CRV [id number] --  - SNA   JAMES SPNE CRV [id number] --  NA [employer] US SPINE NA N/A 2 Implanted

## 2017-08-23 NOTE — ANESTHESIA PREPROCEDURE EVALUATION
Anesthetic History   No history of anesthetic complications            Review of Systems / Medical History  Patient summary reviewed, nursing notes reviewed and pertinent labs reviewed    Pulmonary  Within defined limits                 Neuro/Psych   Within defined limits           Cardiovascular    Hypertension          Past MI, CAD and cardiac stents         GI/Hepatic/Renal  Within defined limits              Endo/Other  Within defined limits           Other Findings              Physical Exam    Airway  Mallampati: II  TM Distance: > 6 cm  Neck ROM: normal range of motion   Mouth opening: Normal     Cardiovascular  Regular rate and rhythm,  S1 and S2 normal,  no murmur, click, rub, or gallop             Dental  No notable dental hx       Pulmonary  Breath sounds clear to auscultation               Abdominal  GI exam deferred       Other Findings            Anesthetic Plan    ASA: 2  Anesthesia type: general          Induction: Intravenous  Anesthetic plan and risks discussed with: Patient

## 2017-08-23 NOTE — ANESTHESIA POSTPROCEDURE EVALUATION
Post-Anesthesia Evaluation and Assessment    Patient: Paris Sinclair MRN: [id number]  SSN: [id number]    YOB: 1940  Age: 78 y.o. Sex: male       Cardiovascular Function/Vital Signs  Visit Vitals    /60    Pulse 70    Temp 36.8 °C (98.3 °F)    Resp 10    Ht 5' 6\" (1.676 m)    Wt 68 kg (150 lb)    SpO2 98%    BMI 24.21 kg/m2       Patient is status post general anesthesia for Procedure(s):  ROBOTIC ASSISTED L1-L5 DECOMPRESSION FUSION (MAZOR)  (OXYGEN). Nausea/Vomiting: None    Postoperative hydration reviewed and adequate. Pain:  Pain Scale 1: Numeric (0 - 10) (08/23/17 1816)  Pain Intensity 1: 2 (08/23/17 1816)   Managed    Neurological Status:   Neuro [facility]):  (no numbness or tingling in either leg) (08/23/17 1816)  Neuro  LLE Motor Response: Moderate; Purposeful (good flex and extention) (08/23/17 1816)  RLE Motor Response: Moderate; Purposeful (good flex and extention) (08/23/17 1816)   At baseline    Mental Status and Level of Consciousness: Arousable    Pulmonary Status:   O2 Device: Non-rebreather mask (08/23/17 1835)   Adequate oxygenation and airway patent    Complications related to anesthesia: None    Post-anesthesia assessment completed.  No concerns    Signed By: Mylene Bourne MD     August 23, 2017

## 2017-08-24 LAB
ANION GAP SERPL CALC-SCNC: 8 MMOL/L (ref 5–15)
BUN SERPL-MCNC: 7 MG/DL (ref 6–20)
BUN/CREAT SERPL: 9 (ref 12–20)
CALCIUM SERPL-MCNC: 7.4 MG/DL (ref 8.5–10.1)
CHLORIDE SERPL-SCNC: 109 MMOL/L (ref 97–108)
CO2 SERPL-SCNC: 21 MMOL/L (ref 21–32)
CREAT SERPL-MCNC: 0.77 MG/DL (ref 0.7–1.3)
GLUCOSE SERPL-MCNC: 123 MG/DL (ref 65–100)
HGB BLD-MCNC: 10 G/DL (ref 12.1–17)
POTASSIUM SERPL-SCNC: 3.8 MMOL/L (ref 3.5–5.1)
SODIUM SERPL-SCNC: 138 MMOL/L (ref 136–145)

## 2017-08-24 PROCEDURE — 74011250636 HC RX REV CODE- 250/636: Performed by: ORTHOPAEDIC SURGERY

## 2017-08-24 PROCEDURE — 85018 HEMOGLOBIN: CPT | Performed by: ORTHOPAEDIC SURGERY

## 2017-08-24 PROCEDURE — 97530 THERAPEUTIC ACTIVITIES: CPT

## 2017-08-24 PROCEDURE — 77010033678 HC OXYGEN DAILY

## 2017-08-24 PROCEDURE — G8987 SELF CARE CURRENT STATUS: HCPCS

## 2017-08-24 PROCEDURE — 65270000029 HC RM PRIVATE

## 2017-08-24 PROCEDURE — 97161 PT EVAL LOW COMPLEX 20 MIN: CPT

## 2017-08-24 PROCEDURE — 77030032490 HC SLV COMPR SCD KNE COVD -B

## 2017-08-24 PROCEDURE — 97116 GAIT TRAINING THERAPY: CPT

## 2017-08-24 PROCEDURE — 74011250637 HC RX REV CODE- 250/637: Performed by: ORTHOPAEDIC SURGERY

## 2017-08-24 PROCEDURE — 80048 BASIC METABOLIC PNL TOTAL CA: CPT | Performed by: ORTHOPAEDIC SURGERY

## 2017-08-24 PROCEDURE — G8978 MOBILITY CURRENT STATUS: HCPCS

## 2017-08-24 PROCEDURE — 36415 COLL VENOUS BLD VENIPUNCTURE: CPT | Performed by: ORTHOPAEDIC SURGERY

## 2017-08-24 PROCEDURE — G8988 SELF CARE GOAL STATUS: HCPCS

## 2017-08-24 PROCEDURE — G8979 MOBILITY GOAL STATUS: HCPCS

## 2017-08-24 PROCEDURE — 97165 OT EVAL LOW COMPLEX 30 MIN: CPT

## 2017-08-24 PROCEDURE — 74011250637 HC RX REV CODE- 250/637: Performed by: NURSE PRACTITIONER

## 2017-08-24 RX ORDER — TRAMADOL HYDROCHLORIDE 50 MG/1
50 TABLET ORAL
Status: DISCONTINUED | OUTPATIENT
Start: 2017-08-24 | End: 2017-08-27 | Stop reason: HOSPADM

## 2017-08-24 RX ORDER — ONDANSETRON 2 MG/ML
INJECTION INTRAMUSCULAR; INTRAVENOUS
Status: DISPENSED
Start: 2017-08-24 | End: 2017-08-25

## 2017-08-24 RX ORDER — TRAMADOL HYDROCHLORIDE 50 MG/1
50-100 TABLET ORAL
Qty: 50 TAB | Refills: 0 | Status: SHIPPED | OUTPATIENT
Start: 2017-08-24

## 2017-08-24 RX ADMIN — OXYCODONE HYDROCHLORIDE 10 MG: 5 TABLET ORAL at 15:46

## 2017-08-24 RX ADMIN — TRAMADOL HYDROCHLORIDE 50 MG: 50 TABLET, FILM COATED ORAL at 19:13

## 2017-08-24 RX ADMIN — DOCUSATE SODIUM AND SENNOSIDES 1 TABLET: 8.6; 5 TABLET, FILM COATED ORAL at 19:13

## 2017-08-24 RX ADMIN — ACETAMINOPHEN 650 MG: 325 TABLET, FILM COATED ORAL at 03:37

## 2017-08-24 RX ADMIN — AMLODIPINE BESYLATE 5 MG: 5 TABLET ORAL at 09:55

## 2017-08-24 RX ADMIN — POLYETHYLENE GLYCOL 3350 17 G: 17 POWDER, FOR SOLUTION ORAL at 09:54

## 2017-08-24 RX ADMIN — DOCUSATE SODIUM AND SENNOSIDES 1 TABLET: 8.6; 5 TABLET, FILM COATED ORAL at 09:55

## 2017-08-24 RX ADMIN — Medication 10 ML: at 21:00

## 2017-08-24 RX ADMIN — TRAMADOL HYDROCHLORIDE 50 MG: 50 TABLET, FILM COATED ORAL at 10:17

## 2017-08-24 RX ADMIN — ATENOLOL 50 MG: 50 TABLET ORAL at 09:55

## 2017-08-24 RX ADMIN — PRAVASTATIN SODIUM 40 MG: 40 TABLET ORAL at 21:00

## 2017-08-24 RX ADMIN — ACETAMINOPHEN 650 MG: 325 TABLET, FILM COATED ORAL at 19:13

## 2017-08-24 RX ADMIN — CEFAZOLIN 2 G: 1 INJECTION, POWDER, FOR SOLUTION INTRAMUSCULAR; INTRAVENOUS; PARENTERAL at 07:00

## 2017-08-24 RX ADMIN — SODIUM CHLORIDE 125 ML/HR: 900 INJECTION, SOLUTION INTRAVENOUS at 16:11

## 2017-08-24 RX ADMIN — ACETAMINOPHEN 650 MG: 325 TABLET, FILM COATED ORAL at 08:00

## 2017-08-24 RX ADMIN — SODIUM CHLORIDE 125 ML/HR: 900 INJECTION, SOLUTION INTRAVENOUS at 08:04

## 2017-08-24 RX ADMIN — OXYCODONE HYDROCHLORIDE 5 MG: 5 TABLET ORAL at 12:29

## 2017-08-24 RX ADMIN — ACETAMINOPHEN 650 MG: 325 TABLET, FILM COATED ORAL at 14:17

## 2017-08-24 NOTE — PROGRESS NOTES
Physical Therapy  8.24.17    12:00-- F/u with patient to attempt AM PT evaluation. Patient had just returned to bed with nursing assist after sitting in chair for 30 min (transferred with OT). Patient requesting pain medication, RN aware. RN who assisted patient back to bed stated patient is \"very unsteady. \" F/u later for PT session. Order received, chart reviewed. Spoke with OT and RN, RN requesting therapy f/u in ~1 hr as they are working on raising the [facility] at this time. F/u later this AM. Thank you.     Sherren Converse, PT, DPT

## 2017-08-24 NOTE — PROGRESS NOTES
Found patient standing at side of bed unattended. Drain and come apart with large amount of blood on floor. Patient oriented to name and place but still appeared confused. Cleaned and returned to bed. Confusion new. ?due to earlier dose of oxycodone. Gave Tramadol 25mg po for c/o incisional pain.

## 2017-08-24 NOTE — PROGRESS NOTES
Problem: Mobility Impaired (Adult and Pediatric)  Goal: *Acute Goals and Plan of Care (Insert Text)  Physical Therapy Goals  Initiated 8/24/2017    1. Patient will move from supine to sit and sit to supine , scoot up and down and roll side to side in bed with modified independence within 4 days. 2. Patient will perform sit to stand with supervision/set-up within 4 days. 3. Patient will ambulate with supervision/set-up for 150 feet with the least restrictive device within 4 days. 4. Patient will ascend/descend 4 stairs with right handrail(s) with supervision/set-up within 4 days. 5. Patient will verbalize and demonstrate understanding of spinal precautions (No bending, lifting greater than 5 lbs, or twisting; log-roll technique; frequent repositioning as instructed) within 4 days. 6. Patient will improve Tinetti score by 4-5 points within 7 days. PHYSICAL THERAPY EVALUATION  Patient: Nedra Soriano (73 y.o. male)  Date: 8/24/2017  Primary Diagnosis: COLIOSIS  LUMBAR STENOSIS   Spinal stenosis of lumbar region  Scoliosis  Procedure(s) (LRB):  ROBOTIC ASSISTED L1-L5 DECOMPRESSION FUSION (MAZOR)  (OXYGEN) (N/A) 1 Day Post-Op   Precautions:   Fall, Back (TLSO)      ASSESSMENT :  Based on the objective data described below, the patient presents with impaired balance, R LE weakness, high distractibility, increased anxiety with activity, high fall risk (Tinetti score 2/28), and overall decline from baseline following spinal surgery. Patient received sidelying in bed, initially attempting to defer therapy due to pain, however with education and encourage from PT and daughter, patient agreeable. Overall min-mod A for functional mobility and max verbal cues for safety with log roll, transfers, and gait. Patient independent at baseline, but today required min-mod A for steps along EOB without AD.  Noted R LE buckling during short distance gait, and unsafe to advance gait further today without AD (however did not appreciate any significant R LE weakness on MMT). Very distracted throughout activity and discussion about d/c plan, which contributes to his impaired safety awareness. At this time, patient may need inpatient rehab if unable to progress enough for d/c home safely with HHPT and assist from wife. Will attempt gait trial with RW tomorrow. Patient will benefit from skilled intervention to address the above impairments. Patients rehabilitation potential is considered to be Good  Factors which may influence rehabilitation potential include:   [ ]         None noted  [ ]         Mental ability/status  [ ]         Medical condition  [ ]         Home/family situation and support systems  [ ]         Safety awareness  [X]         Pain tolerance/management  [ ]         Other:        PLAN :  Recommendations and Planned Interventions:  [X]           Bed Mobility Training             [X]    Neuromuscular Re-Education  [X]           Transfer Training                   [X]    Orthotic/Prosthetic Training  [X]           Gait Training                         [ ]    Modalities  [X]           Therapeutic Exercises           [ ]    Edema Management/Control  [X]           Therapeutic Activities            [X]    Patient and Family Training/Education  [ ]           Other (comment):     Frequency/Duration: Patient will be followed by physical therapy  twice daily to address goals. Discharge Recommendations: Home Health vs Inpatient Rehab  Further Equipment Recommendations for Discharge: TBD       SUBJECTIVE:   Patient stated No I'll just stay here five days.  when discussing rehab option at d/c pending progress      OBJECTIVE DATA SUMMARY:   HISTORY:    Past Medical History:   Diagnosis Date    Arthritis      CAD (coronary artery disease)       5 STENTS,  3 INITIAL 1997    Fatty liver       >40 YEARS    Heart failure ([facility] 2000     ACUTE POST HIP REPLACEMENT     Hypertension      MI (myocardial infarction) ([facility]       2000    Scoliosis       Past Surgical History:   Procedure Laterality Date    CARDIAC SURG PROCEDURE UNLIST         stent placement    HX CATARACT REMOVAL Bilateral      HX HEART CATHETERIZATION   1997    HX HERNIA REPAIR   1980     INGUINAL    HX PROSTATECTOMY   2007     NO CANCER, ELEVATED PSA    HX ROTATOR CUFF REPAIR Left 2010    HX TONSILLECTOMY        REVISE TOTAL HIP REPLACEMENT Left 2004    TOTAL HIP ARTHROPLASTY Left 2000     Prior Level of Function/Home Situation: Patient lives with wife in [facility] with 4 steps to enter. Independent at baseline. Personal factors and/or comorbidities impacting plan of care: PMH, safety awareness, pain management     Home Situation  Home Environment: Apartment ([facility])  # Steps to Enter: 4  One/Two Story Residence: One story  Living Alone: No  Support Systems: Spouse/Significant Other/Partner  Patient Expects to be Discharged to[de-identified] Apartment  Current DME Used/Available at Home: Raised toilet seat, Grab bars, Brace/Splint (shower seat)  Tub or Shower Type: Shower     EXAMINATION/PRESENTATION/DECISION MAKING:   Critical Behavior:  Neurologic State: Alert  Orientation Level: Oriented X4  Cognition: Follows commands  Hearing: Auditory  Auditory Impairment: None  Range Of Motion:  AROM: Generally decreased, functional  Strength:    Strength: Generally decreased, functional (functionally weaker R>L LE; fairly symmetrical on exam)  Tone & Sensation:   Tone: Normal  Sensation:  (denies numbness/tingling)  Coordination:  Coordination: Generally decreased, functional (impaired by his distractability/anxiety)  Functional Mobility:  Bed Mobility:  Rolling: Minimum assistance;Assist x1 (+ max verbal cues)  Supine to Sit: Minimum assistance;Assist x2  Scooting: Supervision  Transfers:  Sit to Stand: Minimum assistance;Assist x1;Additional time  Stand to Sit: Minimum assistance;Assist x1;Additional time  Bed to Chair: Minimum assistance  Balance:   Sitting: Intact; Without support  Standing: Impaired; Without support  Standing - Static: Fair  Standing - Dynamic : Poor  Ambulation/Gait Training:  Distance (ft): 8 Feet (ft)  Assistive Device: Gait belt (HHA x1)  Ambulation - Level of Assistance: Minimal assistance; Moderate assistance  Gait Abnormalities: Decreased step clearance; Antalgic; Path deviations;Trunk sway increased; Step to gait (R knee buckling during gait along bedside)  Base of Support: Widened;Center of gravity altered  Stance: Right decreased; Left increased  Speed/Yuko: Slow;Shuffled  Step Length: Right shortened;Left shortened     Functional Measure:  Tinetti test:      Sitting Balance: 1  Arises: 0  Attempts to Rise: 0  Immediate Standing Balance: 0  Standing Balance: 0  Nudged: 0  Eyes Closed: 0  Turn 360 Degrees - Continuous/Discontinuous: 0  Turn 360 Degrees - Steady/Unsteady: 0  Sitting Down: 1  Balance Score: 2  Indication of Gait: 0  R Step Length/Height: 0  L Step Length/Height: 0  R Foot Clearance: 0  L Foot Clearance: 0  Step Symmetry: 0  Step Continuity: 0  Path: 0  Trunk: 0  Walking Time: 0  Gait Score: 0  Total Score: 2         Tinetti Test and G-code impairment scale:  Percentage of Impairment CH     0%    CI     1-19% CJ     20-39% CK     40-59% CL     60-79% CM     80-99% CN      100%   Tinetti  Score 0-28 28 23-27 17-22 12-16 6-11 1-5 0          Tinetti Tool Score Risk of Falls  <19 = High Fall Risk  19-24 = Moderate Fall Risk  25-28 = Low Fall Risk  Tinetti ME. Performance-Oriented Assessment of Mobility Problems in Elderly Patients. Meyers 68; [id number]. (Scoring Description: PT Bulletin Feb. 10, 1993)     Older adults: Meghana Dutton et al, 2009; n = 1000 [facility] elderly evaluated with ABC, BENNIE, ADL, and IADL)  · Mean BENNIE score for males aged 69-68 years = 26.21(3.40)  · Mean BENNIE score for females age 69-68 years = 25.16(4.30)  · Mean BENNIE score for males over 80 years = 23.29(6.02)  · Mean BENNIE score for females over 80 years = 17.20(8.32)            G codes:   In compliance with CMSs Claims Based Outcome Reporting, the following G-code set was chosen for this patient based on their primary functional limitation being treated: The outcome measure chosen to determine the severity of the functional limitation was the Tinetti with a score of 2/28 which was correlated with the impairment scale. · Mobility - Walking and Moving Around:               - CURRENT STATUS:    CM - 80%-99% impaired, limited or restricted               - GOAL STATUS:           CL - 60%-79% impaired, limited or restricted               - D/C STATUS:                       ---------------To be determined---------------      Physical Therapy Evaluation Charge Determination   History Examination Presentation Decision-Making   HIGH Complexity :3+ comorbidities / personal factors will impact the outcome/ POC  MEDIUM Complexity : 3 Standardized tests and measures addressing body structure, function, activity limitation and / or participation in recreation  LOW Complexity : Stable, uncomplicated  Other outcome measures Tinetti 2/28  HIGH       Based on the above components, the patient evaluation is determined to be of the following complexity level: LOW      Pain:  Pain Scale 1: Numeric (0 - 10)  Pain Intensity 1: 7  Pain Location 1: Back  Pain Orientation 1: Lower  Pain Description 1: Sharp  Pain Intervention(s) 1: Medication (see MAR)  Activity Tolerance:   Good despite pain  Please refer to the flowsheet for vital signs taken during this treatment.   After treatment:   [X]         Patient left in no apparent distress sitting up in chair  [ ]         Patient left in no apparent distress in bed  [X]         Call bell left within reach  [X]         Nursing notified  [X]         Caregiver present  [ ]         Bed alarm activated      COMMUNICATION/EDUCATION:   The patients plan of care was discussed with: Registered Nurse,  and NP.  [X]         Fall prevention education was provided and the patient/caregiver indicated understanding. [X]         Patient/family have participated as able in goal setting and plan of care. [X]         Patient/family agree to work toward stated goals and plan of care. [ ]         Patient understands intent and goals of therapy, but is neutral about his/her participation. [ ]         Patient is unable to participate in goal setting and plan of care.      Thank you for this referral.  Brad Patel, PT, DPT   Time Calculation: 33 mins

## 2017-08-24 NOTE — PROGRESS NOTES
Rounded on Buddhist patients and provided Anointing of the Sick at request of patient    Fr. Allne Mari

## 2017-08-24 NOTE — PROGRESS NOTES
Orthopedic Spine Progress Note  Post Op day: 1 Day Post-Op    2017 7:39 AM     Nimesh Cardenas    Vital Signs:    Patient Vitals for the past 8 hrs:   BP Temp Pulse Resp SpO2   17 0338 115/67 98.8 °F (37.1 °C) 86 15 100 %   17 0118 112/69 99.7 °F (37.6 °C) 87 15 100 %     Temp (24hrs), Av.5 °F (36.9 °C), Min:97.9 °F (36.6 °C), Max:99.7 °F (37.6 °C)      Intake/Output:      1901 -  0700  In: 4450 [I.V.:4200]  Out: 8141 [Urine:835; Drains:200]    Pain Control:   Pain Assessment  Pain Scale 1: Numeric (0 - 10)  Pain Intensity 1: 3  Pain Onset 1: postop  Pain Location 1: Back  Pain Orientation 1: Posterior  Pain Description 1: Aching  Pain Intervention(s) 1: Encouraged PCA    LAB:    Recent Labs      17   0628   HGB  10.0*     Lab Results   Component Value Date/Time    Sodium 138 2017 06:28 AM    Potassium 3.8 2017 06:28 AM    Chloride 109 2017 06:28 AM    CO2 21 2017 06:28 AM    Glucose 123 2017 06:28 AM    BUN 7 2017 06:28 AM    Creatinine 0.77 2017 06:28 AM    Calcium 7.4 2017 06:28 AM       Subjective:  Nimesh Alvarenga is a 78 y.o. male s/p a  Procedure(s):  ROBOTIC ASSISTED L1-L5 DECOMPRESSION FUSION (MAZOR)  (OXYGEN)   Procedure(s):  ROBOTIC ASSISTED L1-L5 DECOMPRESSION FUSION (MAZOR)  (OXYGEN). Tolerating diet. Objective: General: alert, cooperative, no distress. Gastrointestinal:  Soft, non-tender. Neurological: Neurovascular exam within normal limits. Sensation stable. Motor: unchanged C5-T1 and L2-S1. Musculoskeletal:  Kleber's sign negative in bilateral lower extremities. Calves soft, supple, non-tender upon palpation or with passive stretch. Skin: Incision - clean, dry and intact. No significant erythema or swelling.     Dressing: clean, dry, and intact     PT/OT:   Gait:                      Assessment:    s/p Procedure(s):  ROBOTIC ASSISTED L1-L5 DECOMPRESSION FUSION (MAZOR)  (OXYGEN)    Active Problems: Spinal stenosis of lumbar region (2017)      Scoliosis (2017)         Plan:     1. Continue PT/OT  2. Continue established methods of pain control  3. VTE Prophylaxes - TEDS &/or SCDs              Discharge To: Signed By: Linda Pressley MD    Orthopedic Spine Progress Note  Post Op day: 1 Day Post-Op    2017 7:39 AM     Nimesh Cardenas    Vital Signs:    Patient Vitals for the past 8 hrs:   BP Temp Pulse Resp SpO2   17 0338 115/67 98.8 °F (37.1 °C) 86 15 100 %   17 0118 112/69 99.7 °F (37.6 °C) 87 15 100 %     Temp (24hrs), Av.5 °F (36.9 °C), Min:97.9 °F (36.6 °C), Max:99.7 °F (37.6 °C)      Intake/Output:      1901 -  0700  In: 4450 [I.V.:4200]  Out: 9293 [Urine:835; Drains:200]    Pain Control:   Pain Assessment  Pain Scale 1: Numeric (0 - 10)  Pain Intensity 1: 3  Pain Onset 1: postop  Pain Location 1: Back  Pain Orientation 1: Posterior  Pain Description 1: Aching  Pain Intervention(s) 1: Encouraged PCA    LAB:    Recent Labs      17   0628   HGB  10.0*     Lab Results   Component Value Date/Time    Sodium 138 2017 06:28 AM    Potassium 3.8 2017 06:28 AM    Chloride 109 2017 06:28 AM    CO2 21 2017 06:28 AM    Glucose 123 2017 06:28 AM    BUN 7 2017 06:28 AM    Creatinine 0.77 2017 06:28 AM    Calcium 7.4 2017 06:28 AM       Subjective:  Nimesh Joy is a 78 y.o. male s/p a  Procedure(s):  ROBOTIC ASSISTED L1-L5 DECOMPRESSION FUSION (MAZOR)  (OXYGEN)   Procedure(s):  ROBOTIC ASSISTED L1-L5 DECOMPRESSION FUSION (MAZOR)  (OXYGEN). Tolerating diet. Objective: General: alert, cooperative, no distress. Gastrointestinal:  Soft, non-tender. Neurological: Neurovascular exam within normal limits. Sensation stable. Motor: unchanged C5-T1 and L2-S1. Musculoskeletal:  Kleber's sign negative in bilateral lower extremities. Calves soft, supple, non-tender upon palpation or with passive stretch.     Skin: Incision - clean, dry and intact. No significant erythema or swelling. Dressing: clean, dry, and intact     PT/OT:   Gait:                      Assessment:    s/p Procedure(s):  ROBOTIC ASSISTED L1-L5 DECOMPRESSION FUSION (MAZOR)  (OXYGEN)    Active Problems:    Spinal stenosis of lumbar region (8/23/2017)      Scoliosis (8/23/2017)         Plan:     1. Continue PT/OT  2. Continue established methods of pain control  3. VTE Prophylaxes - TEDS &/or SCDs              Discharge To:       Signed By: Zachary Boland MD

## 2017-08-24 NOTE — PROGRESS NOTES
Orthopedic Spine Progress Note  Elena Marion, AGACNP-BC  Work Cell: [phone]    Post Op Day: 1 Day Post-Op    August 24, 2017 3:41 PM     [place]    HPI:    Jeannette Shell is a 78 y.o. male with history of scoliosis and chronic back pain with neurogenic claudication. His pain radiates to his buttocks bilaterally. He reports subjective weakness in his legs as well as numbness in his buttocks. His MRI showed multilevel spondylosis with varying degrees of spinal stenosis and foraminal stenosis. Due to his progressive functional decline, radiographic findings, and following a discussion of the risks, benefits, and alternatives, the patient elected to undergo a  Procedure(s):  ROBOTIC ASSISTED L1-L5 DECOMPRESSION FUSION (MAZOR)  (OXYGEN)    Subjective:   Patient denies headache. HVAC is draining moderate amount of sanguinous fluid. Maintain drain uncharged. PT concerned regarding patient's impaired balance and right leg buckling with ambulation. Motor exam reveals equal strength bilaterally. Patient will likely benefit from rolling walker. He does not want rehab and is adamant to return home with wife's assistance. He denies vision changes, dizziness, chest discomfort, sob, abd pain, n/v/d, fever, chills, rigors, or numbness or tingling in his extremities. Objective:   General: alert, cooperative, no distress. EENT: EOMI. Anicteric sclerae. Oral mucous moist, oropharynx benign  Resp: CTA bilaterally. No wheezing/rhonchi/rales. No accessory muscle use  CV: Regular rhythm, normal rate, no murmurs, gallops, rubs. No cyanosis or clubbing. No edema appreciated in the extremities. Gastrointestinal:  Soft, non-tender. normoactive bowel sounds, no hepatosplenomegaly  Neurological: Follows commands. Speech clear. Affect normal.  DAI. Strength 5/5 in BUE and BLE. Sensation stable. Musculoskeletal:  Calves soft, supple, non-tender upon palpation or with passive stretch. Psych: Good insight.  Not anxious nor agitated. Skin: Incision - clean, dry and intact. No significant surrounding erythema or swelling. Dressing: clean, dry, and intact       Vital Signs:    Patient Vitals for the past 8 hrs:   BP Temp Pulse Resp SpO2   17 1437 139/79 98.8 °F (37.1 °C) 91 16 97 %   17 1135 156/78 - (!) 103 - -   17 0955 120/55 - - - -   17 0748 114/63 98.8 °F (37.1 °C) 67 14 100 %     Temp (24hrs), Av.6 °F (37 °C), Min:97.9 °F (36.6 °C), Max:99.7 °F (37.6 °C)      Intake/Output:   07 -  1900  In: -   Out: 1200 [Urine:1000; Drains:200]   190 -  0700  In: 4450 [I.V.:4200]  Out: 2145 [Urine:835; Drains:560]    Pain Control:   Pain Assessment  Pain Scale 1: Numeric (0 - 10)  Pain Intensity 1: 7  Pain Onset 1: post op  Pain Location 1: Back  Pain Orientation 1: Lower  Pain Description 1: Sharp  Pain Intervention(s) 1: Medication (see MAR)    LAB:    Recent Labs      1728   HGB  10.0*     Lab Results   Component Value Date/Time    Sodium 138 2017 06:28 AM    Potassium 3.8 2017 06:28 AM    Chloride 109 2017 06:28 AM    CO2 21 2017 06:28 AM    Glucose 123 2017 06:28 AM    BUN 7 2017 06:28 AM    Creatinine 0.77 2017 06:28 AM    Calcium 7.4 2017 06:28 AM       PT/OT:   Gait:  Gait  Base of Support: Widened, Center of gravity altered  Speed/Yuko: Slow, Shuffled  Step Length: Right shortened, Left shortened  Stance: Right decreased, Left increased  Gait Abnormalities: Decreased step clearance, Antalgic, Path deviations, Trunk sway increased, Step to gait (R knee buckling during gait along bedside)  Ambulation - Level of Assistance: Minimal assistance, Moderate assistance  Distance (ft): 8 Feet (ft)  Assistive Device: Gait belt (HHA x1)                 Assessment/Plan:    1. Spinal dextroscoliosis, 1 Day Post-Op Procedure(s):  ROBOTIC ASSISTED L1-L5 DECOMPRESSION FUSION (MAZOR)  (OXYGEN)   -Continue PT/OT.  Lumbar brace when out of bed   -Pain management with tramadol. Pt does not tolerate stronger opioids.   -Remove landrum   -Encourage Incentive Spirometer   -Tolerating diet without n/v   -VTE Prophylaxes - TEDS & SCDs    2. Coronary Artery disease   -s/p MI in 2000   -hx of 5 stents   -cont BB and statin. May resume aspirin on Pod#5   -pre-op stress test unremarkable    3. Essential hypertension   -Adequately controlled   -cont home dose amlodipine and atenolol    4. Hx of ETOH abuse   -no signs of acute withdrawal at this time. monitor    5. Acute postoperative blood loss anemia, expected   -hgb 10 postop   -repeat hgb in am. Monitor hvac output    6.  Prostate Ca   -prostatectomy in 2009           Discharge To:  Pending progress with PT/OT    Signed By: Noe Sexton NP

## 2017-08-24 NOTE — PROGRESS NOTES
Problem: Self Care Deficits Care Plan (Adult)  Goal: *Acute Goals and Plan of Care (Insert Text)  Occupational Therapy Goals  Initiated 8/24/2017    1. Patient will perform grooming with modified independence using in standing within 7 days. 2. Patient will perform lower body dressing with modified independence using most appropriate AE within 7 days. 3. Patient will toilet transfer at modified independence within 7 days. 4. Patient will don/doff back brace at modified independence within 7 days. 5. Patient will verbalize/demonstrate 3/3 back precautions during ADL tasks without cues within 7 days. OCCUPATIONAL THERAPY EVALUATION  Patient: Narciso Olguin (73 y.o. male)  Date: 8/24/2017  Primary Diagnosis: COLIOSIS  LUMBAR STENOSIS   Spinal stenosis of lumbar region  Scoliosis  Procedure(s) (LRB):  ROBOTIC ASSISTED L1-L5 DECOMPRESSION FUSION (MAZOR)  (OXYGEN) (N/A) 1 Day Post-Op   Precautions: back         ASSESSMENT :  Based on the objective data described below, the patient presents with overall total A lower body dressing, min A grooming, min A toilet transfer, min A bed mobility, mod A don back brace. Patient agreeable to therapy today and able to recall 3/3 back precautions, reviewed all with patient. Patient min A for use of log roll technique to sit EOB and supervision for scooting EOB with /82. Once sitting, total A for donning socks today and mod A for dressing back brace, educated on use of brace and to where when OOB. Patient transferred with HHA to chair with min A and heavy verbal cues, noted shakiness in LEs and will defer to PT for possible need for walker. Post activity /78. Nursing in room attending to needs. Patient currently limited by decreased ROM, strength, balance needed for ADL tasks as well as new back precautions. Patient will likely benefit from [facility] [place] vs no needs.       Recommend with nursing patient to complete as able in order to maintain strength, endurance and independence: ADLs with supervision/setup, OOB to chair 3x/day and mobilizing to the [facility] for toileting with 1 assist. Thank you for your assistance. Patient will benefit from skilled intervention to address the above impairments. Patients rehabilitation potential is considered to be Good  Factors which may influence rehabilitation potential include:   [X]             None noted  [ ]             Mental ability/status  [ ]             Medical condition  [ ]             Home/family situation and support systems  [ ]             Safety awareness  [ ]             Pain tolerance/management  [ ]             Other:        PLAN :  Recommendations and Planned Interventions:  [X]               Self Care Training                  [X]        Therapeutic Activities  [ ]               Functional Mobility Training    [ ]        Cognitive Retraining  [ ]               Therapeutic Exercises           [ ]        Endurance Activities  [ ]               Balance Training                   [ ]        Neuromuscular Re-Education  [ ]               Visual/Perceptual Training     [X]   Home Safety Training  [X]               Patient Education                 [X]        Family Training/Education  [ ]               Other (comment):     Frequency/Duration: Patient will be followed by occupational therapy 5 times a week to address goals. Discharge Recommendations: Home Health  Further Equipment Recommendations for Discharge: TBD       SUBJECTIVE:   Patient stated I just want to get home.       OBJECTIVE DATA SUMMARY:   HISTORY:   Past Medical History:   Diagnosis Date    Arthritis      CAD (coronary artery disease)       5 STENTS,  3 INITIAL 1997    Fatty liver       >40 YEARS    Heart failure ([facility] [address] 2000     ACUTE POST HIP REPLACEMENT     Hypertension      MI (myocardial infarction) ([facility] [address]       2000    Scoliosis       Past Surgical History:   Procedure Laterality Date    CARDIAC SURG PROCEDURE UNLIST         stent placement  HX CATARACT REMOVAL Bilateral      HX HEART CATHETERIZATION   1997    HX HERNIA REPAIR   1980     INGUINAL    HX PROSTATECTOMY   2007     NO CANCER, ELEVATED PSA    HX ROTATOR CUFF REPAIR Left 2010    HX TONSILLECTOMY        REVISE TOTAL HIP REPLACEMENT Left 2004    TOTAL HIP ARTHROPLASTY Left 2000        Prior Level of Function/Home Situation: Patient lives with wife in [facility] unit with 4 steps into condo but then all one level with elevator. Prior to admission patient was independent with self care, able to drive and active. Expanded or extensive additional review of patient history:      Home Situation  Home Environment: Apartment (condo)  # Steps to Enter: 4  One/Two Story Residence: One story  Living Alone: No  Support Systems: Spouse/Significant Other/Partner  Patient Expects to be Discharged to[de-identified] Apartment  Current DME Used/Available at Home: Raised toilet seat, Grab bars, Brace/Splint (shower seat)  Tub or Shower Type: Shower  [ ]  Right hand dominant   [ ]  Left hand dominant     EXAMINATION OF PERFORMANCE DEFICITS:  Cognitive/Behavioral Status:  Neurologic State: Alert  Orientation Level: Oriented X4  Cognition: Follows commands              Skin: intact     Edema: none noted     Hearing: Auditory  Auditory Impairment: None     Vision/Perceptual:                                      Range of Motion:     AROM: Generally decreased, functional                          Strength:     Strength: Generally decreased, functional                 Coordination:  Coordination: Within functional limits  Fine Motor Skills-Upper: Left Intact; Right Intact    Gross Motor Skills-Upper: Left Intact; Right Intact     Tone & Sensation:     Tone: Normal  Sensation: Impaired                       Balance:  Sitting: Intact; Without support  Standing: Impaired  Standing - Static: Fair  Standing - Dynamic : Fair     Functional Mobility and Transfers for ADLs:  Bed Mobility:  Rolling: Minimum assistance  Supine to Sit: Minimum assistance  Scooting: Supervision     Transfers:  Sit to Stand: Minimum assistance  Stand to Sit: Minimum assistance  Bed to Chair: Minimum assistance  Toilet Transfer : Minimum assistance (infer, to [facility])     ADL Assessment:        Oral Facial Hygiene/Grooming: Minimum assistance (in standing, infer)           Upper Body Dressing: Setup     Lower Body Dressing: Total assistance (don socks)                       ADL Intervention and task modifications:     Patient instructed and indicated understanding the benefits of maintaining activity tolerance, functional mobility, and independence with self care tasks during acute stay  to ensure safe return home and to baseline. Encouraged patient to increase frequency and duration OOB, be out of bed for all meals, perform daily ADLs (as approved by RN/MD regarding bathing etc), and performing functional mobility to/from bathroom. Back, stand erect, log roll, sitting 30 minutes or less,  brace when OOB        Therapeutic Exercise:     Functional Measure:  Barthel Index:      Bathin  Bladder: 0 (landrum)  Bowels: 10  Groomin  Dressin  Feeding: 10  Mobility: 0  Stairs: 0  Toilet Use: 5  Transfer (Bed to Chair and Back): 10  Total: 45         Barthel and G-code impairment scale:  Percentage of impairment CH  0% CI  1-19% CJ  20-39% CK  40-59% CL  60-79% CM  80-99% CN  100%   Barthel Score 0-100 100 99-80 79-60 59-40 20-39 1-19    0   Barthel Score 0-20 20 17-19 13-16 9-12 5-8 1-4 0      The Barthel ADL Index: Guidelines  1. The index should be used as a record of what a patient does, not as a record of what a patient could do. 2. The main aim is to establish degree of independence from any help, physical or verbal, however minor and for whatever reason. 3. The need for supervision renders the patient not independent. 4. A patient's performance should be established using the best available evidence.  Asking the patient, friends/relatives and nurses are the usual sources, but direct observation and common sense are also important. However direct testing is not needed. 5. Usually the patient's performance over the preceding 24-48 hours is important, but occasionally longer periods will be relevant. 6. Middle categories imply that the patient supplies over 50 per cent of the effort. 7. Use of aids to be independent is allowed. Carlo Rodriguez., Barthel, D.W. (1575). Functional evaluation: the Barthel Index. [facility] (14)2. MARGRET Bustamante, Ingrid Tavarez., Gregoria Dutton., Dat, [facility] (1999). Measuring the change indisability after inpatient rehabilitation; comparison of the responsiveness of the Barthel Index and Functional [place] Measure. Journal of Neurology, Neurosurgery, and Psychiatry, 66(4), 013-705. DEBO Lozano, KIMBERLY Babin, & Evert Dillon MMARIA LUZ. (2004.) Assessment of post-stroke quality of life in cost-effectiveness studies: The usefulness of the Barthel Index and the EuroQoL-5D. Quality of Life Research, 13, 105-15            G codes: In compliance with CMSs Claims Based Outcome Reporting, the following G-code set was chosen for this patient based on their primary functional limitation being treated: The outcome measure chosen to determine the severity of the functional limitation was the Barthel Index with a score of 45/100 which was correlated with the impairment scale.       · Self Care:               - CURRENT STATUS:    CK - 40%-59% impaired, limited or restricted               - GOAL STATUS:           CJ - 20%-39% impaired, limited or restricted               - D/C STATUS:                       ---------------To be determined---------------      Occupational Therapy Evaluation Charge Determination   History Examination Decision-Making   LOW Complexity : Brief history review  LOW Complexity : 1-3 performance deficits relating to physical, cognitive , or psychosocial skils that result in activity limitations and / or participation restrictions  LOW Complexity : No comorbidities that affect functional and no verbal or physical assistance needed to complete eval tasks       Based on the above components, the patient evaluation is determined to be of the following complexity level: LOW   Pain:  Pain Scale 1: Numeric (0 - 10)  Pain Intensity 1: 3  Pain Location 1: Back  Pain Orientation 1: Posterior  Pain Description 1: Aching  Pain Intervention(s) 1: Medication (see MAR)  Activity Tolerance:   VSS throughout   Please refer to the flowsheet for vital signs taken during this treatment. After treatment:   [X] Patient left in no apparent distress sitting up in chair  [X] Patient left in no apparent distress in bed  [X] Call bell left within reach  [X] Nursing notified  [X] Caregiver present  [ ] Bed alarm activated      COMMUNICATION/EDUCATION:   The patients plan of care was discussed with: Registered Nurse.  [X] Home safety education was provided and the patient/caregiver indicated understanding. [X] Patient/family have participated as able in goal setting and plan of care. [ ] Patient/family agree to work toward stated goals and plan of care. [ ] Patient understands intent and goals of therapy, but is neutral about his/her participation. [ ] Patient is unable to participate in goal setting and plan of care. This patients plan of care is appropriate for delegation to [facility].      Thank you for this referral.  Mali Aaron  Time Calculation: 33 mins

## 2017-08-24 NOTE — OP NOTES
[address]   [facility], [address] [place] [address]   OP NOTE       Name:  Jemal Hrady   MR#:  [id number]   :  1940   Account #:  [de-identified]    Surgery Date:  2017   Date of Adm:  2017       PREOPERATIVE DIAGNOSES   1. Spinal stenosis. 2. Scoliosis. POSTOPERATIVE DIAGNOSES   1. Spinal stenosis. 2. Scoliosis. PROCEDURES PERFORMED   1. Laminectomy and decompression with medial facetectomies and   foraminotomies at L4-L5.   2. Laminectomy and decompression with medial facetectomies and   foraminotomies at L3-L4. 3. Decompression and laminectomy with medial facetectomies and   foraminotomies at L2-L3. 4. Posterolateral fusion, L1 to L5.   5. Placement of segmental instrumentation using [facility]   pedicle screws from L1 to L5, 5.5 and 6.5 mm in thickness. 6. Transforaminal lumbar interbody fusion, L4-L5. 7. Placement of interbody machine cage, 8 mm Radha cage. 8. Iliac crest bone graft, left hip. 9. Bone marrow aspirate, right hip. 10. Use of local autograft bone. 11. Use of Wattpad robotics to place pedicle screws. COMPLICATIONS: Dural bleb. ESTIMATED BLOOD LOSS: 700 mL. SPECIMENS REMOVED: None. ANESTHESIA:  gen    INDICATION FOR PROCEDURE: The patient is a pleasant male with   history of scoliosis, back pain and spinal stenosis and problems   walking. When he walks his buttocks will go numb, and painful and has   some weakness in the legs and the back. He had degenerative   scoliosis which was moderate in nature but was isolated really to the   lumbar spine. After discussing the risks and benefits of surgery, he   elected to proceed.  He understood the risks, including CSF leak, nerve   damage, infection, perioperative morbidity and mortality, continued   pain, needing further surgery, need for adjacent segment disease   surgery at L5-S1 or above the fusion, continued pain, being no better,   being worse, CSF leak, infection, perioperative morbidity and mortality,   foot drop, and all other risks and he elected to proceed. PROCEDURE: The patient was laid prone on the operating table,   prepped and draped in normal fashion using alcohol and DuraPrep. A   skin incision was made, soft tissue dissected down to spinous   processes and then out over the pars interarticularis. At this time the   soft tissue dissector was then brought over the transverse processes,   facets were decorticated and then pedicle screws were placed. All   screws were quiet to 13 milliamps of EMG stimulation. After screw   placement, laminectomy and decompression was done at L4-L5. Laminectomy was done. Kerrison punches were used to decompress   the cauda equina and the nerve roots and foraminotomies done at L3-  L4. A similar process was done at L2-L3 using Kerrison punches. A   similar process was done, At the top of the decompression, there was   a small bleb under the L2 lamina. This was left at first, but after further   inspection at the end of the case, this was oversewn using 6-0 [place]-  El suture and 6-0 silk suture. At this time, our attention was brought   back down to the L4-L5 interspace. At this time, the right-sided   transforaminal approach was done. At doing this approach, another   bleb was encountered on the lateral aspect of the dura and this was   also oversewed using [place]-El 4-0 nylon sutures. There was no leak   at either one of these sites. At this time, the dura was then retracted   and a diskectomy was done in the transforaminal space on the right   side at L4-L5. The disk was removed, endplates were curetted, and   then an 8 trial fit well and corrected a good amount of deformity. After   this, an 8 cage was placed, filled with allograft and autograft bone. After the cage was placed the posterolateral gutters were then   decorticated from L1 to L5 and so were the facet joints.  Bone graft was   placed in both gutters, some from a separate fascial incision through   the left hip which was iliac crest bone graft. Some bone marrow   aspirate was also mixed with allograft bone. The bone graft was placed   in the posterolateral gutters. Rods were placed and then after gentle   correction maneuver, the caps were placed to an audible click at every   level. The reduction tabs were then broken off. A deep drain was   placed. The fascia was closed with #1 Vicryl, the skin was closed with   2-0 Vicryl, 3-0 Vicryl and Dermabond. There was no complication of   procedure.      I, Dr. Yayo Diop, did the procedure myself with the help of Jurgen Cabral NP.        MD RACHELLE Singh / Pooja Soto   D:  08/23/2017   17:15   T:  08/23/2017   19:42   Job #:  [id number]

## 2017-08-25 LAB
CK MB CFR SERPL CALC: 0.2 % (ref 0–2.5)
CK MB SERPL-MCNC: 2.1 NG/ML (ref 5–25)
CK SERPL-CCNC: 1327 U/L (ref 39–308)
HCT VFR BLD AUTO: 27.6 % (ref 36.6–50.3)
HGB BLD-MCNC: 9.3 G/DL (ref 12.1–17)
HGB BLD-MCNC: 9.4 G/DL (ref 12.1–17)
TROPONIN I SERPL-MCNC: <0.04 NG/ML

## 2017-08-25 PROCEDURE — 97535 SELF CARE MNGMENT TRAINING: CPT

## 2017-08-25 PROCEDURE — 82550 ASSAY OF CK (CPK): CPT | Performed by: INTERNAL MEDICINE

## 2017-08-25 PROCEDURE — 74011250636 HC RX REV CODE- 250/636: Performed by: ORTHOPAEDIC SURGERY

## 2017-08-25 PROCEDURE — 77030032490 HC SLV COMPR SCD KNE COVD -B

## 2017-08-25 PROCEDURE — 93005 ELECTROCARDIOGRAM TRACING: CPT

## 2017-08-25 PROCEDURE — 97116 GAIT TRAINING THERAPY: CPT

## 2017-08-25 PROCEDURE — 85018 HEMOGLOBIN: CPT | Performed by: ORTHOPAEDIC SURGERY

## 2017-08-25 PROCEDURE — 74011250637 HC RX REV CODE- 250/637: Performed by: ORTHOPAEDIC SURGERY

## 2017-08-25 PROCEDURE — 65270000029 HC RM PRIVATE

## 2017-08-25 PROCEDURE — 74011250637 HC RX REV CODE- 250/637: Performed by: NURSE PRACTITIONER

## 2017-08-25 PROCEDURE — 84484 ASSAY OF TROPONIN QUANT: CPT | Performed by: INTERNAL MEDICINE

## 2017-08-25 PROCEDURE — 36415 COLL VENOUS BLD VENIPUNCTURE: CPT | Performed by: ORTHOPAEDIC SURGERY

## 2017-08-25 PROCEDURE — 85014 HEMATOCRIT: CPT | Performed by: ORTHOPAEDIC SURGERY

## 2017-08-25 PROCEDURE — 97530 THERAPEUTIC ACTIVITIES: CPT

## 2017-08-25 RX ORDER — HYDRALAZINE HYDROCHLORIDE 20 MG/ML
10 INJECTION INTRAMUSCULAR; INTRAVENOUS
Status: DISCONTINUED | OUTPATIENT
Start: 2017-08-25 | End: 2017-08-27 | Stop reason: HOSPADM

## 2017-08-25 RX ORDER — ONDANSETRON 2 MG/ML
4 INJECTION INTRAMUSCULAR; INTRAVENOUS
Status: DISCONTINUED | OUTPATIENT
Start: 2017-08-25 | End: 2017-08-27 | Stop reason: HOSPADM

## 2017-08-25 RX ADMIN — DOCUSATE SODIUM AND SENNOSIDES 1 TABLET: 8.6; 5 TABLET, FILM COATED ORAL at 10:06

## 2017-08-25 RX ADMIN — POLYETHYLENE GLYCOL 3350 17 G: 17 POWDER, FOR SOLUTION ORAL at 10:06

## 2017-08-25 RX ADMIN — ACETAMINOPHEN 650 MG: 325 TABLET, FILM COATED ORAL at 21:25

## 2017-08-25 RX ADMIN — Medication 10 ML: at 14:19

## 2017-08-25 RX ADMIN — TRAMADOL HYDROCHLORIDE 50 MG: 50 TABLET, FILM COATED ORAL at 12:19

## 2017-08-25 RX ADMIN — ACETAMINOPHEN 650 MG: 325 TABLET, FILM COATED ORAL at 07:12

## 2017-08-25 RX ADMIN — Medication 10 ML: at 06:00

## 2017-08-25 RX ADMIN — ACETAMINOPHEN 650 MG: 325 TABLET, FILM COATED ORAL at 02:00

## 2017-08-25 RX ADMIN — DOCUSATE SODIUM AND SENNOSIDES 1 TABLET: 8.6; 5 TABLET, FILM COATED ORAL at 17:46

## 2017-08-25 RX ADMIN — PRAVASTATIN SODIUM 40 MG: 40 TABLET ORAL at 21:25

## 2017-08-25 RX ADMIN — AMLODIPINE BESYLATE 5 MG: 5 TABLET ORAL at 10:05

## 2017-08-25 RX ADMIN — ATENOLOL 50 MG: 50 TABLET ORAL at 10:05

## 2017-08-25 RX ADMIN — ONDANSETRON 4 MG: 2 INJECTION INTRAMUSCULAR; INTRAVENOUS at 14:19

## 2017-08-25 RX ADMIN — TRAMADOL HYDROCHLORIDE 50 MG: 50 TABLET, FILM COATED ORAL at 01:04

## 2017-08-25 NOTE — PROGRESS NOTES
Care Management Interventions  PCP Verified by CM: Yes  Transition of Care Consult (CM Consult): [address]: No  Reason Outside [place]: Physician referred to specific agency (patient recie[facility])  Physical Therapy Consult: Yes  Occupational Therapy Consult: Yes  Speech Therapy Consult: No  Current Support Network: Lives with Spouse  Confirm Follow Up Transport: Family  Plan discussed with Pt/Family/Caregiver: Yes  Freedom of Choice Offered: Yes  Discharge Location  Discharge Placement: Home with home health    Chart reviewed for transitions of care, discussed patient during rounds. Patient was admitted for an L1-5 decompression fusion. He has been evaluated by the therapies and they recommend home health.  '  Cm met with patient to explain role and offer support. Patient is alert and oriented x4, confirmed that he lives with his wife in a condo in [place] and also has a home in [place] where he stays 6 months out of the year. Cm discussed home health with patient and he stated that Arlene Hess with [facility] already called them at home and he would like to use them. Referral sent to them via ecin. Cm will be available for any other needs. Amelia RANGEL, DORY    2:06 pm: Cm was asked by nursing to speak with patients' wife and go over the plan of home health. [facility] has accepted patient and wife is concerned about him going home. Cm attempted to meet with them in the room but patient was about to vomit. Cm was later informed that patient did not have a good session with OT and they not recommending he return home. Cm will continue to follow.   [employer] , [place]

## 2017-08-25 NOTE — PROGRESS NOTES
Orthopedic Spine Progress Note  Post Op day: 2 Days Post-Op    2017 7:51 AM     [place]    Vital Signs:    Patient Vitals for the past 8 hrs:   BP Temp Pulse Resp SpO2   17 0408 143/80 98.2 °F (36.8 °C) (!) 101 16 95 %     Temp (24hrs), Av.8 °F (37.1 °C), Min:98.2 °F (36.8 °C), Max:99.4 °F (37.4 °C)      Intake/Output:      1901 -  0700  In: -   Out: 5415 [Urine:2825; Drains:915]    Pain Control:   Pain Assessment  Pain Scale 1: Numeric (0 - 10)  Pain Intensity 1: 3  Pain Onset 1: post op  Pain Location 1: Back  Pain Orientation 1: Lower, Mid  Pain Description 1: Aching, Sore  Pain Intervention(s) 1: Declines    LAB:    Recent Labs      17   0413   HGB  9.4*     Lab Results   Component Value Date/Time    Sodium 138 2017 06:28 AM    Potassium 3.8 2017 06:28 AM    Chloride 109 2017 06:28 AM    CO2 21 2017 06:28 AM    Glucose 123 2017 06:28 AM    BUN 7 2017 06:28 AM    Creatinine 0.77 2017 06:28 AM    Calcium 7.4 2017 06:28 AM       Subjective:  Nimesh Holland is a 78 y.o. male s/p a  Procedure(s):  ROBOTIC ASSISTED L1-L5 DECOMPRESSION FUSION (MAZOR)  (OXYGEN)   Procedure(s):  ROBOTIC ASSISTED L1-L5 DECOMPRESSION FUSION (MAZOR)  (OXYGEN). Tolerating diet. Objective: General: alert, cooperative, no distress. Gastrointestinal:  Soft, non-tender. Neurological: Neurovascular exam within normal limits. Sensation stable. Motor: unchanged C5-T1 and L2-S1. Musculoskeletal:  Kleber's sign negative in bilateral lower extremities. Calves soft, supple, non-tender upon palpation or with passive stretch. Skin: Incision - clean, dry and intact. No significant erythema or swelling.     Dressing: clean, dry, and intact     PT/OT:   Gait:  Gait  Base of Support: Widened, Center of gravity altered  Speed/Yuko: Slow, Shuffled  Step Length: Right shortened, Left shortened  Stance: Right decreased, Left increased  Gait Abnormalities: Decreased step clearance, Antalgic, Path deviations, Trunk sway increased, Step to gait (R knee buckling during gait along bedside)  Ambulation - Level of Assistance: Minimal assistance, Moderate assistance  Distance (ft): 8 Feet (ft)  Assistive Device: Gait belt (HHA x1)                   Assessment:    s/p Procedure(s):  ROBOTIC ASSISTED L1-L5 DECOMPRESSION FUSION (MAZOR)  (OXYGEN)    Active Problems:    Spinal stenosis of lumbar region (8/23/2017)      Scoliosis (8/23/2017)         Plan:     1. Continue PT/OT  2. Continue established methods of pain control  3. VTE Prophylaxes - TEDS &/or SCDs              Discharge To:   Home tomorrow if passes PT    Signed By: Hilda Hopper MD

## 2017-08-25 NOTE — PROGRESS NOTES
Problem: Self Care Deficits Care Plan (Adult)  Goal: *Acute Goals and Plan of Care (Insert Text)  Occupational Therapy Goals  Initiated 8/24/2017    1. Patient will perform grooming with modified independence using in standing within 7 days. 2. Patient will perform lower body dressing with modified independence using most appropriate AE within 7 days. 3. Patient will toilet transfer at modified independence within 7 days. 4. Patient will don/doff back brace at modified independence within 7 days. 5. Patient will verbalize/demonstrate 3/3 back precautions during ADL tasks without cues within 7 days. OCCUPATIONAL THERAPY TREATMENT  Patient: Kenneth Smith (73 y.o. male)  Date: 8/25/2017  Diagnosis: COLIOSIS  LUMBAR STENOSIS   Spinal stenosis of lumbar region  Scoliosis <principal problem not specified>  Procedure(s) (LRB):  ROBOTIC ASSISTED L1-L5 DECOMPRESSION FUSION (MAZOR)  (OXYGEN) (N/A) 2 Days Post-Op  Precautions: Fall, Back (TLSO)  Chart, occupational therapy assessment, plan of care, and goals were reviewed. ASSESSMENT:  Patient received in bed and wife present. Patient able to recall 2/3 back precautions and use of log roll. Instructed on all 3 precautions. Attempted to mobilize patient. In 4 attempts, he was unable to log roll to come to sitting and was physically resistant to assist. Stated pain 7/10. Determined that patient was due for pain medication. Nurse arrived to administer. Attempted again at a later time. Nurse had just pulled drain and patient up in chair. On arrival, found patient standing with wife and wife reporting \"we need the nurse, he's bleeding\". On third attempt, patient with hospitalist due to concerns for cardiac status per nurse. Will defer at this time.    Recommend next visit\" ADLs, AE, mobility  Progression toward goals:  [ ]          Improving appropriately and progressing toward goals  [X]          Improving slowly and progressing toward goals  [ ]          Not making progress toward goals and plan of care will be adjusted       PLAN:  Patient continues to benefit from skilled intervention to address the above impairments. Continue treatment per established plan of care. Discharge Recommendations:  Home Health pending progress  Further Equipment Recommendations for Discharge:  TBD       SUBJECTIVE:   Patient stated 7/10.  for pain      OBJECTIVE DATA SUMMARY:   Cognitive/Behavioral Status:  Neurologic State: Alert  Orientation Level: Oriented to person;Oriented to place  Cognition: Decreased attention/concentration;Decreased command following  Perception: Appears intact  Perseveration: No perseveration noted  Safety/Judgement: Awareness of environment  Functional Mobility and Transfers for ADLs:              Bed Mobility:  Rolling: Minimum assistance  Supine to Sit: Minimum assistance                      Transfers:  Sit to Stand: Contact guard assistance        Balance:  Sitting: Intact  Standing: Intact; With support  Standing - Static: Good;Constant support  Standing - Dynamic : Fair  ADL Intervention:           Cognitive Retraining  Safety/Judgement: Awareness of environment        Activity Tolerance:    fair  Please refer to the flowsheet for vital signs taken during this treatment.   After treatment:   [ ]  Patient left in no apparent distress sitting up in chair  [X]  Patient left in no apparent distress in bed  [X]  Call bell left within reach  [X]  Nursing notified  [X]  Caregiver present(wife)  [ ]  Bed alarm activated      COMMUNICATION/COLLABORATION:   The patients plan of care was discussed with: Registered Nurse     ANDERS Magallanes  Time Calculation: 12 mins

## 2017-08-25 NOTE — PROGRESS NOTES
Bedside and Verbal shift change report given to [facility] (oncoming nurse) by Karrie Fonseca (offgoing nurse). Report included the following information SBAR, Kardex, MAR and Recent Results.

## 2017-08-25 NOTE — CONSULTS
[address]   [address] [place] [address]       Name:  Della Valdez   MR#:  [id number]   :  1940   Account #:  [de-identified]    Date of Consultation:  2017   Date of Adm:  2017       PHYSICIAN ASKING FOR CONSULTATION: Dr. Akash Schmidt: To rule out myocardial infarction. HISTORY OF PRESENT ILLNESS: The patient is a 63-year-old   patient with history of hypertension, hyperlipidemia, and coronary   artery disease, who also had a myocardial infarction postprocedure. Between 10-15 years ago, the patient was diagnosed with myocardial   infarction. At the same time, the patient had had a hip replacement and   postop was very complicated, manifested with a large amount of blood   loss. As a consequence of the blood loss, the patient developed a   myocardial infarction with no complication with in the 2nd hip   replacement that happened in this patient. Since then, the patient had   been completely asymptomatic, taking his medication. Today, because   the patient has some chronic back problem secondary to degenerative   joint disease to the level L1 to L5, the patient was taken for a surgical   procedure today. The patient has a good postop evaluation without any   further complication. His wife, whom is at bedside, notices the changes   and wanted to rule out that the patient will not have any other   myocardial infarction while in the hospital.    ALLERGIES: NO KNOWN DIAGNOSED ALLERGIES. PAST MEDICAL HISTORY: The patient has hypertension,   hyperlipidemia. He has erectile dysfunction, fatty liver, history of right   bundle branch block, scoliosis, chronic back pain. MEDICATIONS:   1. He is taking tramadol. 2. His using also ibuprofen 600 as needed for pain. 3. Atenolol 50 daily. 4. Aspirin 81 mg daily. 5. Norvasc 5 mg daily. SURGICAL HISTORY: He had a prostatectomy done.  He had a   cardiac catheterization and a stent placement, hernia repair, as well as   hip replacement as well as revision . TOXIC HABITS: The patient is still smoking on a daily basis as well as   using alcohol on a daily basis. FAMILY HISTORY: The mother has some atherosclerotic disease as   well as peripheral vascular disease for which she has received a BKA   in one of the extremities. SOCIAL HISTORY: . Living in [place] part of the year and   other time is going to [place]. PHYSICAL EXAMINATION   VITAL SIGNS: Blood pressure is 177/89, respiration is 18, heart rate is   108, and saturation is 99% on room air. GENERAL APPEARANCE: This is a 72-year-old, looks comfortable at   this time, in no acute distress, sleeping most of the time. HEENT: There is no deformity, normal color conjunctivae. Extraocular   movement intact. Moist oral mucosa. NECK: Supple, no JVD, no bruits. CHEST: Thorax symmetrical expansion without deformity. HEART: Regular rhythm. S1, S2 present. LUNGS: Clear to auscultation. ABDOMEN: Soft, bowel sounds present. There is no organomegaly. EXTREMITIES: Limbs symmetric. No edema at this time. The patient   with some soreness in the back. LABORATORY DATA: The lab work from today, his total bilirubin is   only 1. Hemoglobin and hematocrit is 9.3 and 27.6. Chemistry: Sodium   is 138, potassium 3.8. Chloride is 109, CO2 is 21, glucose is 123,   creatinine 0.77, and the GFR is more than 60. ASSESSMENT AND PLAN:   1. Rule out myocardial infarction. From the clinical standpoint of view,   the patient is completely asymptomatic with no data. The patient is not   comfortable. The patient is not holding his chest or complaining of any   chest pain at this time. The second part of the ruling out of the   myocardial infarction will be an echocardiogram as well as a cardiac   markers. Both have been ordered.  If they indicate that they are positive,   we will continue monitoring the patient and get Cardiology involved   after the stress test had been done in this patient. 2. Uncontrolled hypertension. His blood pressure at this time is 177/89. It could be multifactorial, pain related, stress related, but in general, we   will use p.r.n. medication to help the patient. In conjunction with his   home medications, there are atenolol and Norvasc. 3. The patient has some other medical problems but they at this   time are not playing any role.         MD JOCELYN Teague / FOX   D:  08/25/2017   15:32   T:  08/25/2017   16:20   Job #:  [id number]

## 2017-08-25 NOTE — PROGRESS NOTES
Problem: Mobility Impaired (Adult and Pediatric)  Goal: *Acute Goals and Plan of Care (Insert Text)  Physical Therapy Goals  Initiated 8/24/2017    1. Patient will move from supine to sit and sit to supine , scoot up and down and roll side to side in bed with modified independence within 4 days. 2. Patient will perform sit to stand with supervision/set-up within 4 days. 3. Patient will ambulate with supervision/set-up for 150 feet with the least restrictive device within 4 days. 4. Patient will ascend/descend 4 stairs with right handrail(s) with supervision/set-up within 4 days. 5. Patient will verbalize and demonstrate understanding of spinal precautions (No bending, lifting greater than 5 lbs, or twisting; log-roll technique; frequent repositioning as instructed) within 4 days. 6. Patient will improve Tinetti score by 4-5 points within 7 days. PHYSICAL THERAPY TREATMENT  Patient: Melly Jaime (73 y.o. male)  Date: 8/25/2017  Diagnosis: COLIOSIS  LUMBAR STENOSIS   Spinal stenosis of lumbar region  Scoliosis <principal problem not specified>  Procedure(s) (LRB):  ROBOTIC ASSISTED L1-L5 DECOMPRESSION FUSION (MAZOR)  (OXYGEN) (N/A) 2 Days Post-Op  Precautions: Fall, Back (TLSO)      ASSESSMENT:  Pt supine in bed with wife present finishing bathing pt. Pt anxious about increasing pain with activity. Min A to log roll with verbal cues and increased time allotted and reminders to continue breathing. Once sitting on EOB pt became clammy and given cool washcloth. Improved within seconds. Overall, CGA for all out of bed mobility this day and no R LE buckling(as seen yesterday). Use of RW for stability during gait training. Improved step length, speed and stability with use of RW. Ambulated into hallway and back with no LOB or safety concerns. Pt with increased confidence in walking this morning. Returned to room and left sitting up in chair with wife present.  Pt will require HHPT upon returning home and recommending use of RW short term. Progression toward goals:  [X]      Improving appropriately and progressing toward goals  [ ]      Improving slowly and progressing toward goals  [ ]      Not making progress toward goals and plan of care will be adjusted       PLAN:  Patient continues to benefit from skilled intervention to address the above impairments. Continue treatment per established plan of care. Discharge Recommendations:  Home Health  Further Equipment Recommendations for Discharge:  rolling walker       SUBJECTIVE:   Patient stated I was fine yesterday morning and then the afternoon hit me hard.    The patient stated 3/3 back precautions. Reviewed all 3 with patient. OBJECTIVE DATA SUMMARY:   Critical Behavior:  Neurologic State: Alert  Orientation Level: Oriented X4  Cognition: Follows commands     Functional Mobility Training:  Bed Mobility:  Log Rolling: Minimum assistance  Supine to Sit: Minimum assistance              Brace donned with  minimal assistance/contact guard assist   Transfers:  Sit to Stand: Contact guard assistance  Stand to Sit: Contact guard assistance                             Balance:  Sitting: Intact  Standing: Intact; With support  Standing - Static: Good;Constant support  Standing - Dynamic : Fair  Ambulation/Gait Training:  Distance (ft): 150 Feet (ft)  Assistive Device: Gait belt;Walker, rolling  Ambulation - Level of Assistance: Contact guard assistance        Gait Abnormalities: Decreased step clearance; Antalgic        Base of Support: Widened     Speed/Yuko: Slow;Pace decreased (<100 feet/min)  Step Length: Right shortened;Left shortened           Activity Tolerance:   Good  Please refer to the flowsheet for vital signs taken during this treatment.   After treatment:   [X]  Patient left in no apparent distress sitting up in chair  [ ]  Patient left in no apparent distress in bed  [X]  Call bell left within reach  [X]  Nursing notified  [ ]  Caregiver present  [ ]  Bed alarm activated      COMMUNICATION/COLLABORATION:   The patients plan of care was discussed with: Registered Nurse     Silva Pi, PT   Time Calculation: 25 mins

## 2017-08-25 NOTE — PROGRESS NOTES
Problem: Mobility Impaired (Adult and Pediatric)  Goal: *Acute Goals and Plan of Care (Insert Text)  Physical Therapy Goals  Initiated 8/24/2017    1. Patient will move from supine to sit and sit to supine , scoot up and down and roll side to side in bed with modified independence within 4 days. 2. Patient will perform sit to stand with supervision/set-up within 4 days. 3. Patient will ambulate with supervision/set-up for 150 feet with the least restrictive device within 4 days. 4. Patient will ascend/descend 4 stairs with right handrail(s) with supervision/set-up within 4 days. 5. Patient will verbalize and demonstrate understanding of spinal precautions (No bending, lifting greater than 5 lbs, or twisting; log-roll technique; frequent repositioning as instructed) within 4 days. 6. Patient will improve Tinetti score by 4-5 points within 7 days. PHYSICAL THERAPY TREATMENT  Patient: Jeannette Shell (73 y.o. male)  Date: 8/25/2017  Precautions: Fall, Back (TLSO)      ASSESSMENT:  Pt resting in bed after an eventful afternoon of nausea and vomiting(wife had been worried he was at risk for another heart attack d/t his history). Cleared by nursing for mobility. Pt quite lethargic initially but improved once sitting at the EOB. Min-Mod A to complete log roll to sit d/t increased pain and just waking up. One seated and brace donned, pt performed transfers and gait training with RW + CGA. Pt with initial slow gait and decreased energy improved with increased ambulation. Pt walked a greater distance this session with occasional brief pauses to catch breath. Pt's breath sounded labored but pt w/o complaints and O2 saturations reading 97% HR 120bpm. Returned to room with family present and technician in for cardiac exam. Min A to return to supine to manage LEs. Pt asleep within minutes of returning to room. He will need HHPT at discharge.  Wife and daughter are anxious, but feel once pt is able to eat a full meal and get a good nights rest he will be able to increase his activity and be cleared for discharge. Pt will need to complete stair training in the morning as PT did not want to push him too hard this afetrnoon. Progression toward goals:  [X]      Improving appropriately and progressing toward goals  [ ]      Improving slowly and progressing toward goals  [ ]      Not making progress toward goals and plan of care will be adjusted       PLAN:  Patient continues to benefit from skilled intervention to address the above impairments. Continue treatment per established plan of care. Discharge Recommendations:  Home Health  Further Equipment Recommendations for Discharge:  None       SUBJECTIVE:   Patient stated The food here just isn't great so I haven't eaten much.    The patient stated 3/3 back precautions. Reviewed all 3 with patient. OBJECTIVE DATA SUMMARY:   Functional Mobility Training:  Bed Mobility:  Log Rolling: Moderate assistance  Supine to Sit: Minimum assistance     Scooting: Moderate assistance        Brace donned with  minimal assistance/contact guard assist   Transfers:  Sit to Stand: Contact guard assistance  Stand to Sit: Contact guard assistance                             Ambulation/Gait Training:  Distance (ft): 200 Feet (ft)  Assistive Device: Gait belt;Walker, rolling  Ambulation - Level of Assistance: Contact guard assistance        Gait Abnormalities: Decreased step clearance        Base of Support: Widened     Speed/Yuko: Pace decreased (<100 feet/min)  Step Length: Left shortened;Right shortened              Activity Tolerance:   Good  Please refer to the flowsheet for vital signs taken during this treatment.   After treatment:   [ ]  Patient left in no apparent distress sitting up in chair  [X]  Patient left in no apparent distress in bed  [X]  Call bell left within reach  [X]  Nursing notified  [ ]  Caregiver present  [ ]  Bed alarm activated      COMMUNICATION/COLLABORATION:   The patients plan of care was discussed with: Registered Nurse     Aron Gillis, PT   Time Calculation: 30 mins

## 2017-08-25 NOTE — PROGRESS NOTES
Bedside and Verbal shift change report given to Tiffany Thompson RN (oncoming nurse) by Karrie Fonseca RN (offgoing nurse). Report included the following information SBAR, Kardex, Intake/Output, MAR and Recent Results.

## 2017-08-26 ENCOUNTER — APPOINTMENT (OUTPATIENT)
Dept: GENERAL RADIOLOGY | Age: 77
DRG: 457 | End: 2017-08-26
Attending: ORTHOPAEDIC SURGERY
Payer: MEDICARE

## 2017-08-26 LAB
ATRIAL RATE: 91 BPM
CALCULATED P AXIS, ECG09: 33 DEGREES
CALCULATED R AXIS, ECG10: 79 DEGREES
CALCULATED T AXIS, ECG11: 9 DEGREES
DIAGNOSIS, 93000: NORMAL
P-R INTERVAL, ECG05: 124 MS
Q-T INTERVAL, ECG07: 390 MS
QRS DURATION, ECG06: 124 MS
QTC CALCULATION (BEZET), ECG08: 479 MS
VENTRICULAR RATE, ECG03: 91 BPM

## 2017-08-26 PROCEDURE — 65270000029 HC RM PRIVATE

## 2017-08-26 PROCEDURE — 97535 SELF CARE MNGMENT TRAINING: CPT

## 2017-08-26 PROCEDURE — 74011250637 HC RX REV CODE- 250/637: Performed by: ORTHOPAEDIC SURGERY

## 2017-08-26 PROCEDURE — 74000 XR ABD (KUB): CPT

## 2017-08-26 PROCEDURE — 97116 GAIT TRAINING THERAPY: CPT

## 2017-08-26 PROCEDURE — 97530 THERAPEUTIC ACTIVITIES: CPT

## 2017-08-26 RX ADMIN — AMLODIPINE BESYLATE 5 MG: 5 TABLET ORAL at 08:49

## 2017-08-26 RX ADMIN — PRAVASTATIN SODIUM 40 MG: 40 TABLET ORAL at 22:05

## 2017-08-26 RX ADMIN — Medication 10 ML: at 14:17

## 2017-08-26 RX ADMIN — DOCUSATE SODIUM AND SENNOSIDES 1 TABLET: 8.6; 5 TABLET, FILM COATED ORAL at 08:49

## 2017-08-26 RX ADMIN — Medication 10 ML: at 22:05

## 2017-08-26 RX ADMIN — POLYETHYLENE GLYCOL 3350 17 G: 17 POWDER, FOR SOLUTION ORAL at 08:49

## 2017-08-26 RX ADMIN — ACETAMINOPHEN 650 MG: 325 TABLET, FILM COATED ORAL at 19:10

## 2017-08-26 RX ADMIN — DOCUSATE SODIUM AND SENNOSIDES 1 TABLET: 8.6; 5 TABLET, FILM COATED ORAL at 19:10

## 2017-08-26 RX ADMIN — ATENOLOL 50 MG: 50 TABLET ORAL at 08:49

## 2017-08-26 RX ADMIN — ACETAMINOPHEN 650 MG: 325 TABLET, FILM COATED ORAL at 14:17

## 2017-08-26 RX ADMIN — Medication 10 ML: at 05:00

## 2017-08-26 RX ADMIN — ACETAMINOPHEN 650 MG: 325 TABLET, FILM COATED ORAL at 07:51

## 2017-08-26 RX ADMIN — ACETAMINOPHEN 650 MG: 325 TABLET, FILM COATED ORAL at 02:50

## 2017-08-26 RX ADMIN — OXYCODONE HYDROCHLORIDE 5 MG: 5 TABLET ORAL at 07:51

## 2017-08-26 NOTE — PROGRESS NOTES
Hospitalist Progress Note  Jean Horner MD  Office: [phone]        Date of Service:  2017  NAME:  Donna Corado  :  1940  MRN:  [id number]      Admission Summary:    81-year-old patient with history of hypertension, hyperlipidemia, and CAD s/p MI post hip surgery in the past  Admitted to ortho , s/p lumbar fusion for spinal stenosis     Interval history / Subjective:   Doing ok. Does report not having much appetite and feels a bit nauseated but mild. Wife present at bedside as well. Pt denies any cp, dyspnea, nor dizziness at this time     Assessment & Plan:     S/p L-L5 decompression with fusion   - as per prim team postop  -pt,ot    CAD s/p remote MI  -trop neg  -echo pend  -no indc for any acute cardiac injury at this time. Will cont to monitor    HTN  -cont norvasc, atenolol  -pain control  -sbp 140s-150s currently, if persists or worsens then may consider incr bp meds in am    Code status: full  DVT prophylaxis:as per prim team    Care Plan discussed with: Patient/Family and Nurse  Disposition: TBD     Hospital Problems  Date Reviewed: 2017          Codes Class Noted POA    Spinal stenosis of lumbar region ICD-10-CM: M48.06  ICD-9-CM: 724.02  2017 Unknown        Scoliosis ICD-10-CM: M41.9  ICD-9-CM: 737.30  2017 Unknown                Review of Systems:   A comprehensive review of systems was negative except for that written in the HPI. Vital Signs:    Last 24hrs VS reviewed since prior progress note. Most recent are:  Visit Vitals    /82    Pulse 90    Temp 98.1 °F (36.7 °C)    Resp 16    Ht 5' 6\" (1.676 m)    Wt 68 kg (150 lb)    SpO2 97%    BMI 24.21 kg/m2       No intake or output data in the 24 hours ending 17 1021     Physical Examination:             Constitutional:  No acute distress, cooperative, pleasant    ENT:  Oral mucous moist, oropharynx benign.  Neck supple, Resp: CTA bilaterally. No wheezing/rhonchi/rales. No accessory muscle use   CV:  Regular rhythm, normal rate, no murmurs, gallops, rubs    GI:  Soft, non distended, non tender. normoactive bowel sounds, no hepatosplenomegaly     Musculoskeletal:  No edema, warm, 2+ pulses throughout    Neurologic:  Moves all extremities. AAOx3, CN II-XII reviewed            Data Review:    Review and/or order of clinical lab test      Labs:     Recent Labs      08/25/17   1400  08/25/17   0413   HGB  9.3*  9.4*   HCT  27.6*   --      Recent Labs      08/24/17   0628   NA  138   K  3.8   CL  109*   CO2  21   BUN  7   CREA  0.77   GLU  123*   CA  7.4*     No results for input(s): SGOT, GPT, ALT, AP, TBIL, TBILI, TP, ALB, GLOB, GGT, AML, LPSE in the last 72 hours. No lab exists for component: AMYP, HLPSE  No results for input(s): INR, PTP, APTT in the last 72 hours. No lab exists for component: INREXT   No results for input(s): FE, TIBC, PSAT, FERR in the last 72 hours. No results found for: FOL, RBCF   No results for input(s): PH, PCO2, PO2 in the last 72 hours.   Recent Labs      08/25/17   1514   CPK  1327*   CKNDX  0.2   TROIQ  <0.04     No results found for: CHOL, CHOLX, CHLST, CHOLV, HDL, LDL, LDLC, DLDLP, TGLX, TRIGL, TRIGP, CHHD, CHHDX  Lab Results   Component Value Date/Time    Glucose (POC) 118 08/23/2017 10:35 AM     Lab Results   Component Value Date/Time    Color DARK YELLOW 08/16/2017 10:30 AM    Appearance CLEAR 08/16/2017 10:30 AM    Specific gravity 1.020 08/16/2017 10:30 AM    pH (UA) 5.5 08/16/2017 10:30 AM    Protein NEGATIVE  08/16/2017 10:30 AM    Glucose NEGATIVE  08/16/2017 10:30 AM    Ketone NEGATIVE  08/16/2017 10:30 AM    Bilirubin NEGATIVE  08/16/2017 10:30 AM    Urobilinogen 1.0 08/16/2017 10:30 AM    Nitrites NEGATIVE  08/16/2017 10:30 AM    Leukocyte Esterase NEGATIVE  08/16/2017 10:30 AM    Epithelial cells FEW 08/16/2017 10:30 AM    Bacteria NEGATIVE  08/16/2017 10:30 AM    WBC 0-4 08/16/2017 10:30 AM    RBC 0-5 08/16/2017 10:30 AM         Medications Reviewed:     Current Facility-Administered Medications   Medication Dose Route Frequency    ondansetron (ZOFRAN) injection 4 mg  4 mg IntraVENous Q4H PRN    hydrALAZINE (APRESOLINE) 20 mg/mL injection 10 mg  10 mg IntraVENous Q6H PRN    traMADol (ULTRAM) tablet 50 mg  50 mg Oral Q6H PRN    amLODIPine (NORVASC) tablet 5 mg  5 mg Oral DAILY    atenolol (TENORMIN) tablet 50 mg  50 mg Oral DAILY    pravastatin (PRAVACHOL) tablet 40 mg  40 mg Oral QHS    sodium chloride (NS) flush 5-10 mL  5-10 mL IntraVENous Q8H    sodium chloride (NS) flush 5-10 mL  5-10 mL IntraVENous PRN    acetaminophen (TYLENOL) tablet 650 mg  650 mg Oral Q6H    oxyCODONE IR (ROXICODONE) tablet 5 mg  5 mg Oral Q3H PRN    oxyCODONE IR (ROXICODONE) tablet 10 mg  10 mg Oral Q3H PRN    naloxone (NARCAN) injection 0.4 mg  0.4 mg IntraVENous PRN    senna-docusate (PERICOLACE) 8.6-50 mg per tablet 1 Tab  1 Tab Oral BID    polyethylene glycol (MIRALAX) packet 17 g  17 g Oral DAILY    bisacodyl (DULCOLAX) suppository 10 mg  10 mg Rectal DAILY PRN    benzocaine-menthol (CEPACOL) lozenge 1 Lozenge  1 Lozenge Oral PRN    cyclobenzaprine (FLEXERIL) tablet 10 mg  10 mg Oral BID PRN     ______________________________________________________________________  EXPECTED LENGTH OF STAY: 3d 7h                 Sha Stevenson MD

## 2017-08-26 NOTE — PROGRESS NOTES
Problem: Self Care Deficits Care Plan (Adult)  Goal: *Acute Goals and Plan of Care (Insert Text)  Occupational Therapy Goals  Initiated 8/24/2017    1. Patient will perform grooming with modified independence using in standing within 7 days. 2. Patient will perform lower body dressing with modified independence using most appropriate AE within 7 days. 3. Patient will toilet transfer at modified independence within 7 days. 4. Patient will don/doff back brace at modified independence within 7 days. 5. Patient will verbalize/demonstrate 3/3 back precautions during ADL tasks without cues within 7 days. OCCUPATIONAL THERAPY TREATMENT  Patient: Sruthi Johnson (73 y.o. male)  Date: 8/26/2017  Diagnosis: COLIOSIS  LUMBAR STENOSIS   Spinal stenosis of lumbar region  Scoliosis <principal problem not specified>  Procedure(s) (LRB):  ROBOTIC ASSISTED L1-L5 DECOMPRESSION FUSION (MAZOR)  (OXYGEN) (N/A) 3 Days Post-Op  Precautions: Fall, Back (TLSO)      ASSESSMENT:  Pt was cleared for OT by RN. Pt received supine in bed and agreeable to OT. Wife was present for session. Pt able to verablize 2/3 back precautions, OT reviewed 3/3 back precautions with pt and wife. As outlined below pt independence with bed mobility was supervision level with additional time. In standing pt frequently ambulated away from walker/ demonstrated some difficulty with decreased environmental awareness while ambulating with walker. Pt verbalized desire to no longer use walker and OT alerted pt and wife to discuss this with physical therapist (OT spoke with physical therapist regarding this as well). Pt required re-introduction of TLSO donning before able to don supervision level. Reviewed safe ADL in standing at sink. Trained wife and pt in showering safety at home (pt has walk in shower with seat and wife to assist). Per pt request pt returned to bed supine at session end.    Progression toward goals:  [X]       Improving appropriately and progressing toward goals  [ ]       Improving slowly and progressing toward goals  [ ]       Not making progress toward goals and plan of care will be adjusted       PLAN:  Patient continues to benefit from skilled intervention to address the above impairments. Continue treatment per established plan of care. Discharge Recommendations:  None  Further Equipment Recommendations for Discharge:  None- pt has DME in place at home already       SUBJECTIVE:   Patient stated I think the pain medication makes me tired.       OBJECTIVE DATA SUMMARY:   Cognitive/Behavioral Status:  Neurologic State: Alert  Orientation Level: Oriented X4  Cognition: Follows commands;Decreased attention/concentration  Perception: Appears intact  Perseveration: No perseveration noted  Safety/Judgement: Decreased awareness of environment     Functional Mobility and Transfers for ADLs:  Bed Mobility:  Supine to Sit: Supervision; Additional time  Sit to Supine: Supervision; Additional time     Transfers:  Sit to Stand: Stand-by asssistance  Functional Transfers  Bathroom Mobility: Stand-by assistance (Cues for environmental barriers required)     Balance:  Sitting: Intact  Standing: Impaired  Standing - Static: Good  Standing - Dynamic : Fair     ADL Intervention:  Feeding  Feeding Assistance: Supervision/set-up     Grooming  Grooming Assistance: Stand-by assistance  Washing Hands: Stand-by assistance                 Upper Body [address]: Supervision/ set-up     Lower Body Dressing Assistance  Slip on Shoes with Back: Supervision/set-up           Cognitive Retraining  Orientation Retraining: Awareness of environment  Safety/Judgement: Decreased awareness of environment           Pain:  Pain Scale 1: Numeric (0 - 10)  Pain Intensity 1: 0  Pain Location 1: Back  Pain Orientation 1: Mid;Lower  Pain Description 1: Aching  Pain Intervention(s) 1: Medication (see MAR) (given preemptively,pt preparing to get oob/PT)  Activity Tolerance:   Pt reported no dizziness/dyspnea during session.    After treatment:   [ ] Patient left in no apparent distress sitting up in chair  [X] Patient left in no apparent distress in bed  [X] Call bell left within reach  [X] Nursing notified  [X] Caregiver present  [ ] Bed alarm activated      COMMUNICATION/COLLABORATION:   The patients plan of care was discussed with: Physical Therapist and Registered Nurse     Queenie Poe  Time Calculation: 32 mins

## 2017-08-26 NOTE — PROGRESS NOTES
Orthopedic Spine Progress Note  Post Op day: 3 Days Post-Op    2017 7:51 AM     [place]    Vital Signs:    Patient Vitals for the past 8 hrs:   BP Temp Pulse Resp SpO2   17 0306 151/81 98.8 °F (37.1 °C) 80 16 95 %     Temp (24hrs), Av.9 °F (37.2 °C), Min:98.3 °F (36.8 °C), Max:99.5 °F (37.5 °C)      Intake/Output:      1901 -  0700  In: -   Out: 4847 [Urine:950; Drains:205]    Pain Control:   Pain Assessment  Pain Scale 1: Numeric (0 - 10)  Pain Intensity 1: 3  Pain Onset 1: postop  Pain Location 1: Back  Pain Orientation 1: Lower, Mid  Pain Description 1: Aching  Pain Intervention(s) 1: Rest    LAB:    Recent Labs      17   1400   HCT  27.6*   HGB  9.3*     Lab Results   Component Value Date/Time    Sodium 138 2017 06:28 AM    Potassium 3.8 2017 06:28 AM    Chloride 109 2017 06:28 AM    CO2 21 2017 06:28 AM    Glucose 123 2017 06:28 AM    BUN 7 2017 06:28 AM    Creatinine 0.77 2017 06:28 AM    Calcium 7.4 2017 06:28 AM       Subjective:  Nimesh Holland is a 78 y.o. male s/p a  Procedure(s):  ROBOTIC ASSISTED L1-L5 DECOMPRESSION FUSION (MAZOR)  (OXYGEN)   Procedure(s):  ROBOTIC ASSISTED L1-L5 DECOMPRESSION FUSION (MAZOR)  (OXYGEN). Tolerating diet. Objective: General: alert, cooperative, no distress. Gastrointestinal:  Soft, non-tender. Neurological: Neurovascular exam within normal limits. Sensation stable. Motor: unchanged C5-T1 and L2-S1. Musculoskeletal:  Kleber's sign negative in bilateral lower extremities. Calves soft, supple, non-tender upon palpation or with passive stretch. Skin: Incision - clean, dry and intact. No significant erythema or swelling.     Dressing: clean, dry, and intact     PT/OT:   Gait:  Gait  Base of Support: Widened  Speed/Yuko: Pace decreased (<100 feet/min)  Step Length: Left shortened, Right shortened  Stance: Right decreased, Left increased  Gait Abnormalities: Decreased step clearance  Ambulation - Level of Assistance: Contact guard assistance  Distance (ft): 200 Feet (ft)  Assistive Device: Gait belt, Walker, rolling                   Assessment:    s/p Procedure(s):  ROBOTIC ASSISTED L1-L5 DECOMPRESSION FUSION (CLEMOR)  (OXYGEN)    Active Problems:    Spinal stenosis of lumbar region (8/23/2017)      Scoliosis (8/23/2017)         Plan:     1. Continue PT/OT  2. Continue established methods of pain control  3. VTE Prophylaxes - TEDS &/or SCDs   4.  Hospitalist following             Discharge To: home tomorrow     Signed By: Eliane Cruz MD

## 2017-08-26 NOTE — PROGRESS NOTES
Problem: Mobility Impaired (Adult and Pediatric)  Goal: *Acute Goals and Plan of Care (Insert Text)  Physical Therapy Goals  Initiated 8/24/2017    1. Patient will move from supine to sit and sit to supine , scoot up and down and roll side to side in bed with modified independence within 4 days. 2. Patient will perform sit to stand with supervision/set-up within 4 days. 3. Patient will ambulate with supervision/set-up for 150 feet with the least restrictive device within 4 days. 4. Patient will ascend/descend 4 stairs with right handrail(s) with supervision/set-up within 4 days. 5. Patient will verbalize and demonstrate understanding of spinal precautions (No bending, lifting greater than 5 lbs, or twisting; log-roll technique; frequent repositioning as instructed) within 4 days. 6. Patient will improve Tinetti score by 4-5 points within 7 days. PHYSICAL THERAPY TREATMENT  Patient: Shiela Goodwin (73 y.o. male)  Date: 8/26/2017  Diagnosis: COLIOSIS  LUMBAR STENOSIS   Spinal stenosis of lumbar region  Scoliosis <principal problem not specified>  Procedure(s) (LRB):  ROBOTIC ASSISTED L1-L5 DECOMPRESSION FUSION (MAZOR)  (OXYGEN) (N/A) 3 Days Post-Op  Precautions: Fall, Back (TLSO)      ASSESSMENT:  Progressing well with gait training with improved distance and sweta today. He presents with increased trunk sway exacerbated by a known leg length discrepancy but noted accelerated gait and path deviations without an assistive device. Gait quality and balance improved using the RW and recommend one for discharge home. Discussed anticipated quick weaning from the device following discharge with understanding verbalized. Reviewed bed mobility and completed to simulate his home environment. Challenged with avoiding twisting and sidelying to sit but the patient and his wife responded well to cues for safety and technique.   Progression toward goals:  [X]      Improving appropriately and progressing toward goals  [ ] Improving slowly and progressing toward goals  [ ]      Not making progress toward goals and plan of care will be adjusted       PLAN:  Patient continues to benefit from skilled intervention to address the above impairments. Continue treatment per established plan of care. Discharge Recommendations:  Home Health  Further Equipment Recommendations for Discharge:  rolling walker       SUBJECTIVE:   Patient stated I feel better today than yesterday.    The patient stated 3/3 back precautions. Reviewed all 3 with patient. OBJECTIVE DATA SUMMARY:   Critical Behavior:  Neurologic State: Alert  Orientation Level: Oriented X4  Cognition: Follows commands, Decreased attention/concentration  Safety/Judgement: Decreased awareness of environment  Functional Mobility Training:  Bed Mobility:  Log    Supine to Sit: Minimum assistance; Additional time  Sit to Supine: Supervision; Additional time           Brace donned with  minimal assistance/contact guard assist while seated edge of bed  Transfers:  Sit to Stand: Stand-by asssistance           Bed to Chair: Stand-by asssistance                    Balance:  Sitting: Intact  Standing: Impaired  Standing - Static: Good  Standing - Dynamic : Fair  Ambulation/Gait Training:  Distance (ft): 350 Feet (ft)     Ambulation - Level of Assistance: Contact guard assistance;Stand-by asssistance        Gait Abnormalities: Decreased step clearance; Antalgic; Path deviations;Trunk sway increased        Base of Support: Widened     Speed/Yuko: Pace decreased (<100 feet/min)  Step Length: Right shortened;Left shortened                             2 trials with 175' without DME and 175' with RW  Stairs:            Therapeutic Exercises:      Pain:  Pain Scale 1: Numeric (0 - 10)  Pain Intensity 1: 0  Pain Location 1: Back  Pain Orientation 1: Mid;Lower  Pain Description 1: Aching  Pain Intervention(s) 1: Medication (see MAR) (given preemptively,pt preparing to get oob/PT)  Activity Tolerance:      After treatment:   [ ]  Patient left in no apparent distress sitting up in chair  [X]  Patient left in no apparent distress in bed  [X]  Call bell left within reach  [X]  Nursing notified  [X]  Caregiver present  [ ]  Bed alarm activated      COMMUNICATION/COLLABORATION:   The patients plan of care was discussed with: Registered Nurse     Lorie Grullon, PT, DPT   Time Calculation: 27 mins

## 2017-08-27 VITALS
DIASTOLIC BLOOD PRESSURE: 84 MMHG | BODY MASS INDEX: 24.11 KG/M2 | HEIGHT: 66 IN | WEIGHT: 150 LBS | OXYGEN SATURATION: 96 % | RESPIRATION RATE: 18 BRPM | HEART RATE: 77 BPM | SYSTOLIC BLOOD PRESSURE: 148 MMHG | TEMPERATURE: 98.2 F

## 2017-08-27 PROCEDURE — 74011250637 HC RX REV CODE- 250/637: Performed by: ORTHOPAEDIC SURGERY

## 2017-08-27 PROCEDURE — 74011250637 HC RX REV CODE- 250/637: Performed by: NURSE PRACTITIONER

## 2017-08-27 PROCEDURE — 97116 GAIT TRAINING THERAPY: CPT

## 2017-08-27 RX ORDER — HYDROCODONE BITARTRATE AND ACETAMINOPHEN 5; 325 MG/1; MG/1
1 TABLET ORAL
Qty: 40 TAB | Refills: 0 | Status: SHIPPED | OUTPATIENT
Start: 2017-08-27

## 2017-08-27 RX ADMIN — TRAMADOL HYDROCHLORIDE 50 MG: 50 TABLET, FILM COATED ORAL at 04:06

## 2017-08-27 RX ADMIN — ACETAMINOPHEN 650 MG: 325 TABLET, FILM COATED ORAL at 01:24

## 2017-08-27 RX ADMIN — ATENOLOL 50 MG: 50 TABLET ORAL at 09:56

## 2017-08-27 RX ADMIN — ACETAMINOPHEN 650 MG: 325 TABLET, FILM COATED ORAL at 07:24

## 2017-08-27 RX ADMIN — DOCUSATE SODIUM AND SENNOSIDES 1 TABLET: 8.6; 5 TABLET, FILM COATED ORAL at 09:56

## 2017-08-27 RX ADMIN — AMLODIPINE BESYLATE 5 MG: 5 TABLET ORAL at 09:56

## 2017-08-27 NOTE — PROGRESS NOTES
Hospitalist Progress Note  Louisa Irvin MD  Office: [phone]        Date of Service:  2017  NAME:  Sandra Wong  :  1940  MRN:  [id number]      Admission Summary:    53-year-old patient with history of hypertension, hyperlipidemia, and CAD s/p MI post hip surgery in the past  Admitted to ortho , s/p lumbar fusion for spinal stenosis     Interval history / Subjective:   Doing ok. no overnight issues. will be going home today     Assessment & Plan:     S/p L-L5 decompression with fusion   - as per prim team postop  -pt,ot    CAD s/p remote MI  -trop neg  -echo pend, can f/u results with pcp  -no indc for any acute cardiac injury at this time. Will cont to monitor    HTN  -cont norvasc, atenolol  -pain control    Ok to d/c home per hospitalist standpoint. Pt being d/c home today per prim team.      Code status: full  DVT prophylaxis:as per prim team    Care Plan discussed with: Patient/Family and Nurse  Disposition: TBD     Hospital Problems  Date Reviewed: 2017          Codes Class Noted POA    Spinal stenosis of lumbar region ICD-10-CM: M48.06  ICD-9-CM: 724.02  2017 Unknown        Scoliosis ICD-10-CM: M41.9  ICD-9-CM: 737.30  2017 Unknown                Review of Systems:   A comprehensive review of systems was negative except for that written in the HPI. Vital Signs:    Last 24hrs VS reviewed since prior progress note. Most recent are:  Visit Vitals    /84    Pulse 77    Temp 98.2 °F (36.8 °C)    Resp 17    Ht 5' 6\" (1.676 m)    Wt 68 kg (150 lb)    SpO2 96%    BMI 24.21 kg/m2       No intake or output data in the 24 hours ending 17 1037     Physical Examination:             Constitutional:  No acute distress, cooperative, pleasant    ENT:  Oral mucous moist, oropharynx benign. Neck supple,    Resp:  CTA bilaterally. No wheezing/rhonchi/rales.  No accessory muscle use   CV: Regular rhythm, normal rate, no murmurs, gallops, rubs    GI:  Soft, non distended, non tender. normoactive bowel sounds, no hepatosplenomegaly     Musculoskeletal:  No edema, warm, 2+ pulses throughout    Neurologic:  Moves all extremities. AAOx3, CN II-XII reviewed            Data Review:    Review and/or order of clinical lab test      Labs:     Recent Labs      08/25/17   1400  08/25/17   0413   HGB  9.3*  9.4*   HCT  27.6*   --      No results for input(s): NA, K, CL, CO2, BUN, CREA, GLU, CA, MG, PHOS, URICA in the last 72 hours. No results for input(s): SGOT, GPT, ALT, AP, TBIL, TBILI, TP, ALB, GLOB, GGT, AML, LPSE in the last 72 hours. No lab exists for component: AMYP, HLPSE  No results for input(s): INR, PTP, APTT in the last 72 hours. No lab exists for component: INREXT, INREXT   No results for input(s): FE, TIBC, PSAT, FERR in the last 72 hours. No results found for: FOL, RBCF   No results for input(s): PH, PCO2, PO2 in the last 72 hours.   Recent Labs      08/25/17   1514   CPK  1327*   CKNDX  0.2   TROIQ  <0.04     No results found for: CHOL, CHOLX, CHLST, CHOLV, HDL, LDL, LDLC, DLDLP, TGLX, TRIGL, TRIGP, CHHD, CHHDX  Lab Results   Component Value Date/Time    Glucose (POC) 118 08/23/2017 10:35 AM     Lab Results   Component Value Date/Time    Color DARK YELLOW 08/16/2017 10:30 AM    Appearance CLEAR 08/16/2017 10:30 AM    Specific gravity 1.020 08/16/2017 10:30 AM    pH (UA) 5.5 08/16/2017 10:30 AM    Protein NEGATIVE  08/16/2017 10:30 AM    Glucose NEGATIVE  08/16/2017 10:30 AM    Ketone NEGATIVE  08/16/2017 10:30 AM    Bilirubin NEGATIVE  08/16/2017 10:30 AM    Urobilinogen 1.0 08/16/2017 10:30 AM    Nitrites NEGATIVE  08/16/2017 10:30 AM    Leukocyte Esterase NEGATIVE  08/16/2017 10:30 AM    Epithelial cells FEW 08/16/2017 10:30 AM    Bacteria NEGATIVE  08/16/2017 10:30 AM    WBC 0-4 08/16/2017 10:30 AM    RBC 0-5 08/16/2017 10:30 AM         Medications Reviewed:     Current Facility-Administered Medications   Medication Dose Route Frequency    ondansetron (ZOFRAN) injection 4 mg  4 mg IntraVENous Q4H PRN    hydrALAZINE (APRESOLINE) 20 mg/mL injection 10 mg  10 mg IntraVENous Q6H PRN    traMADol (ULTRAM) tablet 50 mg  50 mg Oral Q6H PRN    amLODIPine (NORVASC) tablet 5 mg  5 mg Oral DAILY    atenolol (TENORMIN) tablet 50 mg  50 mg Oral DAILY    pravastatin (PRAVACHOL) tablet 40 mg  40 mg Oral QHS    sodium chloride (NS) flush 5-10 mL  5-10 mL IntraVENous Q8H    sodium chloride (NS) flush 5-10 mL  5-10 mL IntraVENous PRN    acetaminophen (TYLENOL) tablet 650 mg  650 mg Oral Q6H    oxyCODONE IR (ROXICODONE) tablet 5 mg  5 mg Oral Q3H PRN    oxyCODONE IR (ROXICODONE) tablet 10 mg  10 mg Oral Q3H PRN    naloxone (NARCAN) injection 0.4 mg  0.4 mg IntraVENous PRN    senna-docusate (PERICOLACE) 8.6-50 mg per tablet 1 Tab  1 Tab Oral BID    polyethylene glycol (MIRALAX) packet 17 g  17 g Oral DAILY    bisacodyl (DULCOLAX) suppository 10 mg  10 mg Rectal DAILY PRN    benzocaine-menthol (CEPACOL) lozenge 1 Lozenge  1 Lozenge Oral PRN    cyclobenzaprine (FLEXERIL) tablet 10 mg  10 mg Oral BID PRN     ______________________________________________________________________  EXPECTED LENGTH OF STAY: 3d 7h                 Lauren John MD

## 2017-08-27 NOTE — PROGRESS NOTES
Orthopedic Spine Progress Note  Post Op day: 4 Days Post-Op    2017 9:20 AM     Nimesh Cardenas    Vital Signs:    Patient Vitals for the past 8 hrs:   BP Temp Pulse Resp SpO2   17 0356 (!) 139/93 98.2 °F (36.8 °C) 81 17 96 %     Temp (24hrs), Av.6 °F (36.4 °C), Min:97 °F (36.1 °C), Max:98.2 °F (36.8 °C)      Intake/Output:          Pain Control:   Pain Assessment  Pain Scale 1: Numeric (0 - 10)  Pain Intensity 1: 2  Pain Onset 1: post  op  Pain Location 1: Back  Pain Orientation 1: Mid, Lower  Pain Description 1: Aching  Pain Intervention(s) 1: Medication (see MAR)    LAB:    Recent Labs      17   1400   HCT  27.6*   HGB  9.3*     Lab Results   Component Value Date/Time    Sodium 138 2017 06:28 AM    Potassium 3.8 2017 06:28 AM    Chloride 109 2017 06:28 AM    CO2 21 2017 06:28 AM    Glucose 123 2017 06:28 AM    BUN 7 2017 06:28 AM    Creatinine 0.77 2017 06:28 AM    Calcium 7.4 2017 06:28 AM       Subjective:  Nimesh Kelsey is a 78 y.o. male s/p a  Procedure(s):  ROBOTIC ASSISTED L1-L5 DECOMPRESSION FUSION (MAZOR)  (OXYGEN)   Procedure(s):  ROBOTIC ASSISTED L1-L5 DECOMPRESSION FUSION (MAZOR)  (OXYGEN). Tolerating diet. Objective: General: alert, cooperative, no distress. Gastrointestinal:  Soft, non-tender. Neurological: Neurovascular exam within normal limits. Sensation stable. Motor: unchanged C5-T1 and L2-S1. Musculoskeletal:  Kleber's sign negative in bilateral lower extremities. Calves soft, supple, non-tender upon palpation or with passive stretch. Skin: Incision - clean, dry and intact. No significant erythema or swelling.     Dressing: clean, dry, and intact     PT/OT:   Gait:  Gait  Base of Support: Widened  Speed/Yuko: Pace decreased (<100 feet/min)  Step Length: Right shortened, Left shortened  Stance: Right decreased, Left increased  Gait Abnormalities: Decreased step clearance, Antalgic, Path deviations, Trunk sway increased  Ambulation - Level of Assistance: Contact guard assistance, Stand-by asssistance  Distance (ft): 350 Feet (ft)  Assistive Device: Gait belt, Walker, rolling                   Assessment:    s/p Procedure(s):  ROBOTIC ASSISTED L1-L5 DECOMPRESSION FUSION (MAZOR)  (OXYGEN)    Active Problems:    Spinal stenosis of lumbar region (8/23/2017)      Scoliosis (8/23/2017)         Plan:     1. Continue PT/OT  2. Continue established methods of pain control  3.   VTE Prophylaxes - TEDS &/or SCDs              Discharge To:  home    Signed By: Yvan Rose MD

## 2017-08-27 NOTE — ROUTINE PROCESS
Bedside and Verbal shift change report given to [facility] (oncoming nurse) by Johnny Morel (offgoing nurse). Report included the following information SBAR, Kardex, OR Summary, Intake/Output, MAR, Accordion and Recent Results.

## 2017-08-27 NOTE — PROGRESS NOTES
Discharge instructions given to patient and wife, both verbalized understanding. Escorted off unit by volunteer via w/c carrying all belongings.

## 2017-08-27 NOTE — PROGRESS NOTES
Bedside shift change report given to Peña Pacheco (oncoming nurse) by Janie Cevallos (offgoing nurse). Report included the following information SBAR, Kardex, Intake/Output and MAR.

## 2017-08-27 NOTE — PROGRESS NOTES
Problem: Mobility Impaired (Adult and Pediatric)  Goal: *Acute Goals and Plan of Care (Insert Text)  Physical Therapy Goals  Initiated 8/24/2017    1. Patient will move from supine to sit and sit to supine , scoot up and down and roll side to side in bed with modified independence within 4 days. 2. Patient will perform sit to stand with supervision/set-up within 4 days. 3. Patient will ambulate with supervision/set-up for 150 feet with the least restrictive device within 4 days. 4. Patient will ascend/descend 4 stairs with right handrail(s) with supervision/set-up within 4 days. 5. Patient will verbalize and demonstrate understanding of spinal precautions (No bending, lifting greater than 5 lbs, or twisting; log-roll technique; frequent repositioning as instructed) within 4 days. 6. Patient will improve Tinetti score by 4-5 points within 7 days. PHYSICAL THERAPY TREATMENT     Patient: Cate Lincoln (73 y.o. male)  Date: 8/27/2017  Diagnosis: COLIOSIS  LUMBAR STENOSIS   Spinal stenosis of lumbar region  Scoliosis <principal problem not specified>  Procedure(s) (LRB):  ROBOTIC ASSISTED L1-L5 DECOMPRESSION FUSION (MAZOR)  (OXYGEN) (N/A) 4 Days Post-Op  Precautions: Fall, Back (TLSO)  Chart, physical therapy assessment, plan of care and goals were reviewed. ASSESSMENT:  Pt agreeable to treatment. Pt was able to transfer at [place] [id number]. Pt was able to state 3/3 sternal precautions but require v.c to adhere. Pt was able to ambulate safely with rolling walker. Pt performed steps for home entry. Pt attempting step over step. Instructed and reinforced step to pattern. Pt has received rolling walker and will have family support at discharge. Pt is cleared for discharge from PT standpoint.   Progression toward goals:  [X]      Improving appropriately and progressing toward goals  [ ]      Improving slowly and progressing toward goals  [ ]      Not making progress toward goals and plan of care will be adjusted PLAN:  Patient continues to benefit from skilled intervention to address the above impairments. Continue treatment per established plan of care. Discharge Recommendations:  Home Health    Further Equipment Recommendations for Discharge:  Ordered and received rolling walker       SUBJECTIVE:   Patient stated I want to get dressed first.   The patient stated 3/3 back precautions. Reviewed all 3 with patient. OBJECTIVE DATA SUMMARY:   Critical Behavior:  Neurologic State: Alert  Orientation Level: Oriented X4, Appropriate for age  Cognition: Appropriate decision making, Appropriate safety awareness, Appropriate for age attention/concentration  Safety/Judgement: Decreased awareness of environment  Functional Mobility Training:  Bed Mobility:  Log Rolling: Stand-by asssistance  Supine to Sit: Stand-by asssistance (with v.c)                          Brace donned with  minimal assistance/contact guard assist   Transfers:  Sit to Stand: Stand-by asssistance  Stand to Sit: Stand-by asssistance                             Balance:  Sitting: Intact  Standing: Impaired  Standing - Static: Good  Standing - Dynamic : Fair  Ambulation/Gait Training:                  160 feet with rolling walker        Base of Support: Widened     Speed/Yuko: Pace decreased (<100 feet/min)                       Stairs:   5 steps with one rail. V.c for sequencing and safety        Therapeutic Exercises:      Pain:  Pain Scale 1: Numeric (0 - 10)  Pain Intensity 1: 0  Pain Location 1: Back     Pain Description 1: Aching  Pain Intervention(s) 1: Medication (see MAR)  Activity Tolerance:   Limited   Please refer to the flowsheet for vital signs taken during this treatment.      After treatment:   [X] Patient left in no apparent distress sitting up in chair  [ ] Patient left in no apparent distress in bed  [X] Call bell left within reach  [X] Nursing notified  [ ] Caregiver present  [ ] Bed alarm activated      COMMUNICATION/COLLABORATION: The patients plan of care was discussed with: Registered Nurse     Fanny Rousseau PTA   Time Calculation: 20 mins

## 2017-08-29 ENCOUNTER — APPOINTMENT (OUTPATIENT)
Dept: CT IMAGING | Age: 77
DRG: 389 | End: 2017-08-29
Attending: EMERGENCY MEDICINE
Payer: MEDICARE

## 2017-08-29 ENCOUNTER — APPOINTMENT (OUTPATIENT)
Dept: GENERAL RADIOLOGY | Age: 77
DRG: 389 | End: 2017-08-29
Attending: EMERGENCY MEDICINE
Payer: MEDICARE

## 2017-08-29 ENCOUNTER — APPOINTMENT (OUTPATIENT)
Dept: ULTRASOUND IMAGING | Age: 77
DRG: 389 | End: 2017-08-29
Attending: EMERGENCY MEDICINE
Payer: MEDICARE

## 2017-08-29 ENCOUNTER — HOSPITAL ENCOUNTER (INPATIENT)
Age: 77
LOS: 5 days | Discharge: HOME HEALTH | DRG: 389 | End: 2017-09-03
Attending: EMERGENCY MEDICINE | Admitting: FAMILY MEDICINE
Payer: MEDICARE

## 2017-08-29 DIAGNOSIS — K74.60 CIRRHOSIS OF LIVER WITHOUT ASCITES, UNSPECIFIED HEPATIC CIRRHOSIS TYPE (HCC): ICD-10-CM

## 2017-08-29 DIAGNOSIS — D64.9 ANEMIA, UNSPECIFIED TYPE: ICD-10-CM

## 2017-08-29 DIAGNOSIS — R74.8 ELEVATED LIVER ENZYMES: ICD-10-CM

## 2017-08-29 PROBLEM — R10.84 GENERALIZED ABDOMINAL PAIN: Status: ACTIVE | Noted: 2017-08-29

## 2017-08-29 PROBLEM — K85.90 ACUTE PANCREATITIS: Status: ACTIVE | Noted: 2017-08-29

## 2017-08-29 PROBLEM — K52.9 COLITIS, ACUTE: Status: ACTIVE | Noted: 2017-08-29

## 2017-08-29 PROBLEM — K56.7 ILEUS (HCC): Status: ACTIVE | Noted: 2017-08-29

## 2017-08-29 LAB
ALBUMIN SERPL-MCNC: 2.9 G/DL (ref 3.5–5)
ALBUMIN/GLOB SERPL: 0.8 {RATIO} (ref 1.1–2.2)
ALP SERPL-CCNC: 137 U/L (ref 45–117)
ALT SERPL-CCNC: 112 U/L (ref 12–78)
ANION GAP SERPL CALC-SCNC: 11 MMOL/L (ref 5–15)
APPEARANCE UR: ABNORMAL
AST SERPL-CCNC: 109 U/L (ref 15–37)
BACTERIA URNS QL MICRO: NEGATIVE /HPF
BASOPHILS # BLD: 0 K/UL (ref 0–0.1)
BASOPHILS NFR BLD: 0 % (ref 0–1)
BILIRUB DIRECT SERPL-MCNC: 0.8 MG/DL (ref 0–0.2)
BILIRUB INDIRECT SERPL-MCNC: 1 MG/DL (ref 0–1.1)
BILIRUB SERPL-MCNC: 1.8 MG/DL (ref 0.2–1)
BILIRUB SERPL-MCNC: 1.8 MG/DL (ref 0.2–1)
BILIRUB UR QL CFM: POSITIVE
BUN SERPL-MCNC: 11 MG/DL (ref 6–20)
BUN/CREAT SERPL: 13 (ref 12–20)
CALCIUM SERPL-MCNC: 8.5 MG/DL (ref 8.5–10.1)
CHLORIDE SERPL-SCNC: 97 MMOL/L (ref 97–108)
CO2 SERPL-SCNC: 26 MMOL/L (ref 21–32)
COLOR UR: ABNORMAL
CREAT SERPL-MCNC: 0.87 MG/DL (ref 0.7–1.3)
CRP SERPL-MCNC: 7.32 MG/DL (ref 0–0.6)
DIFFERENTIAL METHOD BLD: ABNORMAL
EOSINOPHIL # BLD: 0 K/UL (ref 0–0.4)
EOSINOPHIL NFR BLD: 0 % (ref 0–7)
EPITH CASTS URNS QL MICRO: ABNORMAL /LPF
ERYTHROCYTE [DISTWIDTH] IN BLOOD BY AUTOMATED COUNT: 13.8 % (ref 11.5–14.5)
ERYTHROCYTE [SEDIMENTATION RATE] IN BLOOD: 59 MM/HR (ref 0–20)
GLOBULIN SER CALC-MCNC: 3.8 G/DL (ref 2–4)
GLUCOSE SERPL-MCNC: 114 MG/DL (ref 65–100)
GLUCOSE UR STRIP.AUTO-MCNC: NEGATIVE MG/DL
HCT VFR BLD AUTO: 30.9 % (ref 36.6–50.3)
HGB BLD-MCNC: 10.4 G/DL (ref 12.1–17)
HGB UR QL STRIP: NEGATIVE
INR PPP: 1.3 (ref 0.9–1.1)
KETONES UR QL STRIP.AUTO: NEGATIVE MG/DL
LEUKOCYTE ESTERASE UR QL STRIP.AUTO: ABNORMAL
LIPASE SERPL-CCNC: 717 U/L (ref 73–393)
LYMPHOCYTES # BLD: 1.5 K/UL (ref 0.8–3.5)
LYMPHOCYTES NFR BLD: 8 % (ref 12–49)
MCH RBC QN AUTO: 32.5 PG (ref 26–34)
MCHC RBC AUTO-ENTMCNC: 33.7 G/DL (ref 30–36.5)
MCV RBC AUTO: 96.6 FL (ref 80–99)
METAMYELOCYTES NFR BLD MANUAL: 1 %
MONOCYTES # BLD: 2.1 K/UL (ref 0–1)
MONOCYTES NFR BLD: 11 % (ref 5–13)
MYELOCYTES NFR BLD MANUAL: 2 %
NEUTS BAND NFR BLD MANUAL: 1 % (ref 0–6)
NEUTS SEG # BLD: 14.7 K/UL (ref 1.8–8)
NEUTS SEG NFR BLD: 77 % (ref 32–75)
NITRITE UR QL STRIP.AUTO: NEGATIVE
NRBC # BLD: 0.17 K/UL (ref 0–0.01)
NRBC BLD-RTO: 0.9 PER 100 WBC
PH UR STRIP: 6 [PH] (ref 5–8)
PLATELET # BLD AUTO: 408 K/UL (ref 150–400)
POTASSIUM SERPL-SCNC: 3.1 MMOL/L (ref 3.5–5.1)
PROT SERPL-MCNC: 6.7 G/DL (ref 6.4–8.2)
PROT UR STRIP-MCNC: NEGATIVE MG/DL
PROTHROMBIN TIME: 13.7 SEC (ref 9–11.1)
RBC # BLD AUTO: 3.2 M/UL (ref 4.1–5.7)
RBC #/AREA URNS HPF: ABNORMAL /HPF (ref 0–5)
RBC MORPH BLD: ABNORMAL
RBC MORPH BLD: ABNORMAL
SODIUM SERPL-SCNC: 134 MMOL/L (ref 136–145)
SP GR UR REFRACTOMETRY: 1.02 (ref 1–1.03)
UROBILINOGEN UR QL STRIP.AUTO: 2 EU/DL (ref 0.2–1)
WBC # BLD AUTO: 18.9 K/UL (ref 4.1–11.1)
WBC NRBC COR # BLD: ABNORMAL 10*3/UL
WBC URNS QL MICRO: ABNORMAL /HPF (ref 0–4)

## 2017-08-29 PROCEDURE — 74177 CT ABD & PELVIS W/CONTRAST: CPT

## 2017-08-29 PROCEDURE — 85610 PROTHROMBIN TIME: CPT | Performed by: EMERGENCY MEDICINE

## 2017-08-29 PROCEDURE — 96374 THER/PROPH/DIAG INJ IV PUSH: CPT

## 2017-08-29 PROCEDURE — 74011250636 HC RX REV CODE- 250/636: Performed by: EMERGENCY MEDICINE

## 2017-08-29 PROCEDURE — 99285 EMERGENCY DEPT VISIT HI MDM: CPT

## 2017-08-29 PROCEDURE — 74011250636 HC RX REV CODE- 250/636: Performed by: FAMILY MEDICINE

## 2017-08-29 PROCEDURE — 85025 COMPLETE CBC W/AUTO DIFF WBC: CPT | Performed by: EMERGENCY MEDICINE

## 2017-08-29 PROCEDURE — 86140 C-REACTIVE PROTEIN: CPT | Performed by: EMERGENCY MEDICINE

## 2017-08-29 PROCEDURE — 83690 ASSAY OF LIPASE: CPT | Performed by: EMERGENCY MEDICINE

## 2017-08-29 PROCEDURE — 76705 ECHO EXAM OF ABDOMEN: CPT

## 2017-08-29 PROCEDURE — 81001 URINALYSIS AUTO W/SCOPE: CPT | Performed by: EMERGENCY MEDICINE

## 2017-08-29 PROCEDURE — 65270000029 HC RM PRIVATE

## 2017-08-29 PROCEDURE — 74011636320 HC RX REV CODE- 636/320: Performed by: EMERGENCY MEDICINE

## 2017-08-29 PROCEDURE — 87040 BLOOD CULTURE FOR BACTERIA: CPT | Performed by: EMERGENCY MEDICINE

## 2017-08-29 PROCEDURE — 85652 RBC SED RATE AUTOMATED: CPT | Performed by: EMERGENCY MEDICINE

## 2017-08-29 PROCEDURE — 51798 US URINE CAPACITY MEASURE: CPT

## 2017-08-29 PROCEDURE — 80053 COMPREHEN METABOLIC PANEL: CPT | Performed by: EMERGENCY MEDICINE

## 2017-08-29 PROCEDURE — 74011000258 HC RX REV CODE- 258: Performed by: EMERGENCY MEDICINE

## 2017-08-29 PROCEDURE — 36415 COLL VENOUS BLD VENIPUNCTURE: CPT | Performed by: EMERGENCY MEDICINE

## 2017-08-29 PROCEDURE — 96361 HYDRATE IV INFUSION ADD-ON: CPT

## 2017-08-29 PROCEDURE — 71020 XR CHEST PA LAT: CPT

## 2017-08-29 PROCEDURE — 82248 BILIRUBIN DIRECT: CPT | Performed by: EMERGENCY MEDICINE

## 2017-08-29 PROCEDURE — 83605 ASSAY OF LACTIC ACID: CPT | Performed by: EMERGENCY MEDICINE

## 2017-08-29 RX ORDER — SODIUM CHLORIDE 9 MG/ML
125 INJECTION, SOLUTION INTRAVENOUS CONTINUOUS
Status: DISCONTINUED | OUTPATIENT
Start: 2017-08-29 | End: 2017-08-30

## 2017-08-29 RX ORDER — POTASSIUM CHLORIDE 7.45 MG/ML
10 INJECTION INTRAVENOUS
Status: COMPLETED | OUTPATIENT
Start: 2017-08-30 | End: 2017-08-30

## 2017-08-29 RX ORDER — SODIUM CHLORIDE 0.9 % (FLUSH) 0.9 %
10 SYRINGE (ML) INJECTION
Status: COMPLETED | OUTPATIENT
Start: 2017-08-29 | End: 2017-08-29

## 2017-08-29 RX ADMIN — SODIUM CHLORIDE 100 ML: 900 INJECTION, SOLUTION INTRAVENOUS at 20:40

## 2017-08-29 RX ADMIN — POTASSIUM CHLORIDE 10 MEQ: 10 INJECTION, SOLUTION INTRAVENOUS at 23:53

## 2017-08-29 RX ADMIN — Medication 10 ML: at 20:40

## 2017-08-29 RX ADMIN — SODIUM CHLORIDE 1000 ML: 900 INJECTION, SOLUTION INTRAVENOUS at 19:20

## 2017-08-29 RX ADMIN — IOPAMIDOL 100 ML: 755 INJECTION, SOLUTION INTRAVENOUS at 20:40

## 2017-08-29 RX ADMIN — SODIUM CHLORIDE 125 ML/HR: 900 INJECTION, SOLUTION INTRAVENOUS at 23:54

## 2017-08-29 RX ADMIN — SODIUM CHLORIDE 1000 ML: 900 INJECTION, SOLUTION INTRAVENOUS at 19:21

## 2017-08-29 RX ADMIN — PIPERACILLIN SODIUM,TAZOBACTAM SODIUM 3.38 G: 3; .375 INJECTION, POWDER, FOR SOLUTION INTRAVENOUS at 23:49

## 2017-08-29 NOTE — IP AVS SNAPSHOT
[address] 
[phone] Patient: [employer] MRN: [id number] WZ Current Discharge Medication List  
  
ASK your doctor about these medications Dose & Instructions Dispensing Information Comments Morning Noon Evening Bedtime  
 amLODIPine 5 mg tablet Commonly known as:  Sundra [place] Your last dose was: Your next dose is: TAKE 1 TABLET BY MOUTH EVERY DAY Refills:  1  
     
   
   
   
  
 atenolol 50 mg tablet Commonly known as:  TENORMIN Your last dose was: Your next dose is:    
   
   
 Dose:  50 mg Take 50 mg by mouth daily. Refills:  0 HYDROcodone-acetaminophen 5-325 mg per tablet Commonly known as:  Matt Rasmussen Your last dose was: Your next dose is:    
   
   
 Dose:  1 Tab Take 1 Tab by mouth every four (4) hours as needed for Pain. Max Daily Amount: 6 Tabs. Quantity:  40 Tab Refills:  0  
     
   
   
   
  
 pravastatin 40 mg tablet Commonly known as:  PRAVACHOL Your last dose was: Your next dose is:    
   
   
 Dose:  40 mg Take 40 mg by mouth nightly. Refills:  0  
     
   
   
   
  
 traMADol 50 mg tablet Commonly known as:  ULTRAM  
   
Your last dose was: Your next dose is:    
   
   
 Dose:   mg Take 1-2 Tabs by mouth every six (6) hours as needed. Max Daily Amount: 400 mg. Quantity:  50 Tab Refills:  0

## 2017-08-29 NOTE — IP AVS SNAPSHOT
[address] [address] 
[phone] Patient: Paris Sinclair MRN: [id number] You are allergic to the following No active allergies Recent Documentation Height Weight BMI Smoking Status 1.676 m 69.9 kg 24.87 kg/m2 Current Some Day Smoker Unresulted Labs Order Current Status SAMPLE TO BLOOD BANK In process CULTURE, BLOOD, PAIRED Preliminary result CULTURE, BODY FLUID W GRAM STAIN Preliminary result Emergency Contacts Name Discharge Info Relation Home Work Mobile [address] Lozano CAREGIVER [3] Spouse [3] [phone] About your hospitalization You were admitted on:  August 29, 2017 You last received care in the:  [facility] 2N MED SURG You were discharged on:  September 3, 2017 Unit phone number:  [phone] Why you were hospitalized Your primary diagnosis was:  Pseudo-Obstruction Of Colon Your diagnoses also included:  Ileus (Hcc), Generalized Abdominal Pain, Leukocytosis, Anemia, Hypokalemia, Elevated Liver Enzymes Providers Seen During Your Hospitalizations Provider Role Specialty Primary office phone Jenae Sanchez DO Attending Provider Emergency Medicine [phone] Hans Solorio MD Attending Provider [facility] [phone] Lynsey Jolley MD Attending Provider Internal Medicine [phone] Richard Garcia MD Attending Provider Internal Medicine [phone] Tyler Huynh MD Attending Provider Internal Medicine [phone] Your Primary Care Physician (PCP) Primary Care Physician Office Phone Office Fax [facility] [phone] Follow-up Information Follow up With Details Comments Contact Info [address] [id number] [phone] [address] [place]  
[phone] Current Discharge Medication List  
  
ASK your doctor about these medications Dose & Instructions Dispensing Information Comments Morning Noon Evening Bedtime  
 amLODIPine 5 mg tablet Commonly known as:  Monserrat Hunter Your last dose was: Your next dose is: TAKE 1 TABLET BY MOUTH EVERY DAY Refills:  1  
     
   
   
   
  
 atenolol 50 mg tablet Commonly known as:  TENORMIN Your last dose was: Your next dose is:    
   
   
 Dose:  50 mg Take 50 mg by mouth daily. Refills:  0 HYDROcodone-acetaminophen 5-325 mg per tablet Commonly known as:  Ronnie Collar Your last dose was: Your next dose is:    
   
   
 Dose:  1 Tab Take 1 Tab by mouth every four (4) hours as needed for Pain. Max Daily Amount: 6 Tabs. Quantity:  40 Tab Refills:  0  
     
   
   
   
  
 pravastatin 40 mg tablet Commonly known as:  PRAVACHOL Your last dose was: Your next dose is:    
   
   
 Dose:  40 mg Take 40 mg by mouth nightly. Refills:  0  
     
   
   
   
  
 traMADol 50 mg tablet Commonly known as:  ULTRAM  
   
Your last dose was: Your next dose is:    
   
   
 Dose:   mg Take 1-2 Tabs by mouth every six (6) hours as needed. Max Daily Amount: 400 mg. Quantity:  50 Tab Refills:  0 Discharge Instructions None Discharge Orders None Introducing [facility]! Toedora Franco introduces Performable patient portal. Now you can access parts of your medical record, email your doctor's office, and request medication refills online. 1. In your internet browser, go to [email or web address] 2. Click on the First Time User? Click Here link in the Sign In box. You will see the New Member Sign Up page. 3. Enter your Performable Access Code exactly as it appears below.  You will not need to use this code after youve completed the sign-up process. If you do not sign up before the expiration date, you must request a new code. · "[employer] Access Code: [email or web address] Expires: 10/25/2017  9:52 AM 
 
4. Enter the last four digits of your Social Security Number (xxxx) and Date of Birth (mm/dd/yyyy) as indicated and click Submit. You will be taken to the next sign-up page. 5. Create a "[employer] ID. This will be your "[employer] login ID and cannot be changed, so think of one that is secure and easy to remember. 6. Create a "[employer] password. You can change your password at any time. 7. Enter your Password Reset Question and Answer. This can be used at a later time if you forget your password. 8. Enter your e-mail address. You will receive e-mail notification when new information is available in [address] 9. Click Sign Up. You can now view and download portions of your medical record. 10. Click the Download Summary menu link to download a portable copy of your medical information. If you have questions, please visit the Frequently Asked Questions section of the "[employer] website. Remember, "[employer] is NOT to be used for urgent needs. For medical emergencies, dial 911. Now available from your iPhone and Android! General Information Please provide this summary of care documentation to your next provider. Patient Signature:  ____________________________________________________________ Date:  ____________________________________________________________  
  
Rexann Ports Provider Signature:  ____________________________________________________________ Date:  ____________________________________________________________

## 2017-08-29 NOTE — ED TRIAGE NOTES
Pt was discharged from [facility] on Sunday after lower lumbar fusion. HH RN amador labs yesterday and called today with results that Hgb was 9 and WBC was elevated. Pt's PCP recommended pt to come to ED. Denies lightheadedness/ dizziness, CP, fevers or chills. Reports SOB with exertion.  Pt wearing back brace, however, spouse reports that pt's abd is distended; denies N/V.

## 2017-08-29 NOTE — ED NOTES
Pt placed on monitor x 3, side rails up x 2, call light placed within reach, bed in lowest and locked position, pt changed into gown, and provided with warm blanket.

## 2017-08-29 NOTE — DISCHARGE SUMMARY
[address] [place] [address]   [address]  [phone]     Discharge Summary       PATIENT ID: Macey Remy  MRN: [id number]   YOB: 1940    DATE OF ADMISSION: 8/23/2017  9:04 AM    DATE OF DISCHARGE: 8/27/2017   PRIMARY CARE PROVIDER: Kourtney Ford MD     CONSULTATIONS: None    PROCEDURES/SURGERIES: Procedure(s):  ROBOTIC ASSISTED L1-L5 DECOMPRESSION FUSION (MAZOR)  (OXYGEN)    History of Present Illness:  Macey Remy is a 78 y.o. male with history of scoliosis and chronic back pain with neurogenic claudication. His pain radiates to his buttocks bilaterally. He reports subjective weakness in his legs as well as numbness in his buttocks. His MRI showed multilevel spondylosis with varying degrees of spinal stenosis and foraminal stenosis. After failing conservative therapy and a discussion of the risks, benefits, alternatives, perioperative course, and potential complications of surgery, he consented to undergo a Procedure(s):  ROBOTIC ASSISTED L1-L5 DECOMPRESSION FUSION (MAZOR)  (OXYGEN). Hospital Course:  Nimesh Cardenas tolerated the procedure well. He was transferred  to the recovery room in stable condition. After a brief stay the patient was then transferred to the Spinal Surgery Unit at [address] [place] [address].  On postoperative day #1, the dressing was clean and dry, he was neurovascularly intact. The patient was afebrile and vital signs were stable. Calves were soft and non-tender bilaterally. His PCA was stopped on postoperative day #1 and he was started on oral pain medications. On postoperative day  # 2, the patient was tolerating a regular diet and making slow progress with physical therapy. His hemovac drain was removed on postoperative day #2. He had no headaches.     Hemoglobin prior to discharge were   Lab Results   Component Value Date/Time    HGB 9.3 08/25/2017 02:00 PM        [employer] made satisfactory progress with physical therapy and was discharged to Home in stable condition on postoperative day 4. He was provided with routine postoperative instructions and advised to follow up with Polina Singh MD in 2 weeks following discharge from the hospital.      FOLLOW UP APPOINTMENTS:    Follow-up Information     Follow up With Details Comments Contact Info    AT [address] [place] [address] 83 Vi Flood MD   [facility]  [address]  [facility]  [phone]             ADDITIONAL CARE RECOMMENDATIONS:     When to call your Orthopaedic Surgeon:  -Signs of infection-if your incision is red; continues to have drainage; drainage has a foul odor or if you have a persistent fever over 101 degrees for 24 hours  -Nausea or vomiting, severe headache  -Loss of bowel or bladder function, inability to urinate  -Changes in sensation in your arms or legs (numbness, tingling, loss of color)  -Increased weakness-greater than before your surgery  -Severe pain or pain not relieved by medications  -Signs of a blood clot in your leg-calf pain, tenderness, redness, swelling of lower leg    When to call your Primary Care Physician:  -Concerns about medical conditions such as diabetes, high blood pressure, asthma, congestive heart failure  -Call if blood sugars are elevated, persistent headache or dizziness, coughing or congestion, constipation or diarrhea, burning with urination, abnormal heart rate    When to call 911 and go to the nearest emergency room:  -Acute onset of chest pain, shortness of breath, difficulty breathing    Activity  -You are going home a well person, be as active as possible. Your only exercise should be walking. Start with short frequent walks and increase your walking distance each day.  -Limit the amount of time you sit to 20-30 minute intervals.   Sitting for prolonged periods of time will be uncomfortable for you following surgery.  -Do NOT lift anything over 5 pounds  -Do NOT do any straining, twisting or bending  -When you are in bed, you may lay on your back or on either side. Do NOT lie on your stomach    Brace  -If you have a back brace, you should wear your brace at all times when you are out of bed. Do not wear the brace while in bed or showering.  -Remember to always wear a cotton t-shirt underneath your brace.  -Do not bend or twist when your brace is off    Diet  -Resume usual diet; drink plenty of fluids; eat foods high in fiber  -It is important to have regular bowel movements. Pain medications may cause constipation. You may want to take a stool softener (such as Senokot-S or Colace) to prevent constipation.   -If constipation occurs, take a laxative (such as Dulcolax tablets, Milk of Magnesia, or a suppository). Laxatives should only be used if the above preventable measures have failed and you still have not had a bowel movement after three days    Driving  -You may not drive or return to work until instructed by your physician. However, you may ride in the car for short periods of time. Incision Care  -You may take brief showers but do not run the water run directly onto the wound. After showering or bathing, remove the wet dressing and gently blot the wound dry with a soft towel.  -Do not rub or apply any lotions or ointments to your incision site.   -Do not soak or scrub your wound  -Keep a dry dressing (ABD and paper tape) on your incision and have it changed daily for 14 days after surgery; more often if your incision is draining. Have your caregiver wash their hands thoroughly before changing your dressing.  -You will have absorbable sutures and steristrips (white tape) on your incision. Leave the steristrips on until they fall off. Showering  -You may shower in approximately 4 days after your surgery.    -Leave the dressing on during your shower.   Do NOT allow the water to run directly onto your dressing. Once you get out of the shower, put on a dry dressing.  -Reminder- your brace can be removed while showering. Remember to not bend or twist while your brace is off.    -Do not take a tub bath. Preventing blood clots  -You have been given T.E.D. stockings to wear. Continue to wear these for 7 days after your discharge. Put them on in the morning and take them off at night.    -They are used to increase your circulation and prevent blood clots from forming in your legs  -T. E.D. stockings can be machine washed, temperature not to exceed 160° F (71°C) and machine dried for 15 to 20 minutes, temperature not to exceed 250° F (121°C). Pain management  -Take pain medication as prescribed; decrease the amount you use as your pain lessens  -DO not wait until you are in extreme pain to take your medication.  -Avoid alcoholic beverages while taking pain medication    Pain Medication Safety  DO:  -Read the Medication Guide   -Take your medicine exactly as prescribed   -Store your medicine away from children and in a safe place   -Flush unused medicine down the toilet   -Call your healthcare provider for medical advice about side effects. You may report side effects to FDA at 4-104-FDA-3412.   -Please be aware that many medications contain Tylenol. We do not want you to over medicate so please read the information below as a guide. Do not take more than 4 Grams of Tylenol in a 24 hour period.   (There are 1000 milligrams in one Gram)                                                                                                                                                                                                                                                                                                                                                                  Percocet contains 325 mg of Tylenol per tablet (do not take more than 12 tablets in 24 hours)  Lortab contains 500 mg of Tylenol per tablet (do not take more than 8 tablets in 24 hours)  Norco contains 325 mg of Tylenol per tablet (do not take more than 12 tablets in 24 hours). DO NOT:  -Do not give your medicine to others   -Do not take medicine unless it was prescribed for you   -Do not stop taking your medicine without talking to your healthcare provider   -Do not break, chew, crush, dissolve, or inject your medicine. If you cannot swallow your medicine whole, talk to your healthcare provider.  -Do not drink alcohol while taking this medicine  -Do not take anti-inflammatory medications or aspirin unless instructed by your      Physician. DISCHARGE MEDICATIONS:  Discharge Medication List as of 8/27/2017 10:58 AM      START taking these medications    Details   HYDROcodone-acetaminophen (NORCO) 5-325 mg per tablet Take 1 Tab by mouth every four (4) hours as needed for Pain. Max Daily Amount: 6 Tabs., Print, Disp-40 Tab, R-0         CONTINUE these medications which have CHANGED    Details   traMADol (ULTRAM) 50 mg tablet Take 1-2 Tabs by mouth every six (6) hours as needed. Max Daily Amount: 400 mg., Print, Disp-50 Tab, R-0         CONTINUE these medications which have NOT CHANGED    Details   amLODIPine (NORVASC) 5 mg tablet TAKE 1 TABLET BY MOUTH EVERY DAY, Historical Med, R-1      atenolol (TENORMIN) 50 mg tablet Take 50 mg by mouth daily. , Historical Med      pravastatin (PRAVACHOL) 40 mg tablet Take 40 mg by mouth nightly., Historical Med         STOP taking these medications       ibuprofen (MOTRIN) 600 mg tablet Comments:   Reason for Stopping:         aspirin delayed-release 81 mg tablet Comments:   Reason for Stopping:               PHYSICAL EXAMINATION AT DISCHARGE:  General: Pleasant, alert, cooperative, no distress. EENT: EOMI. Anicteric sclerae. Oral mucous moist, oropharynx benign. Resp: CTA bilaterally. No wheezing/rhonchi/rales. No accessory muscle use. CV: Regular rhythm, normal rate, no murmurs, gallops, rubs.  No cyanosis or clubbing. No edema appreciated in the extremities. Gastrointestinal:  Soft, non-tender, non-distended. normoactive bowel sounds, no hepatosplenomegaly  Neurological: Follows commands. DAI. Speech clear. Sensation intact to light touch. Motor: unchanged C5-T1 and L2-S1. Musculoskeletal:  Calves soft, supple, non-tender upon palpation or with passive stretch. Psych: Good insight. Not anxious nor agitated. Skin: Good turgor. No rashes or lesions. Incision - clean, dry and intact. No significant erythema or swelling.       CHRONIC MEDICAL DIAGNOSES:  Problem List as of 8/27/2017  Date Reviewed: 8/24/2017          Codes Class Noted - Resolved    Spinal stenosis of lumbar region ICD-10-CM: M48.06  ICD-9-CM: 724.02  8/23/2017 - Present        Scoliosis ICD-10-CM: M41.9  ICD-9-CM: 737.30  8/23/2017 - Present              Signed:   Calderon Mera NP  8/29/2017  2:52 PM

## 2017-08-30 ENCOUNTER — APPOINTMENT (OUTPATIENT)
Dept: NUCLEAR MEDICINE | Age: 77
DRG: 389 | End: 2017-08-30
Attending: SURGERY
Payer: MEDICARE

## 2017-08-30 PROBLEM — D64.9 ANEMIA: Status: ACTIVE | Noted: 2017-08-30

## 2017-08-30 PROBLEM — K59.81 PSEUDO-OBSTRUCTION OF COLON: Status: ACTIVE | Noted: 2017-08-30

## 2017-08-30 PROBLEM — R74.8 ELEVATED LIVER ENZYMES: Status: ACTIVE | Noted: 2017-08-30

## 2017-08-30 PROBLEM — D72.829 LEUKOCYTOSIS: Status: ACTIVE | Noted: 2017-08-30

## 2017-08-30 PROBLEM — E87.6 HYPOKALEMIA: Status: ACTIVE | Noted: 2017-08-30

## 2017-08-30 LAB
ALBUMIN SERPL-MCNC: 2.5 G/DL (ref 3.5–5)
ALBUMIN/GLOB SERPL: 0.9 {RATIO} (ref 1.1–2.2)
ALP SERPL-CCNC: 115 U/L (ref 45–117)
ALT SERPL-CCNC: 87 U/L (ref 12–78)
ANION GAP SERPL CALC-SCNC: 10 MMOL/L (ref 5–15)
AST SERPL-CCNC: 87 U/L (ref 15–37)
BASOPHILS # BLD: 0 K/UL (ref 0–0.1)
BASOPHILS NFR BLD: 0 % (ref 0–1)
BILIRUB SERPL-MCNC: 1.6 MG/DL (ref 0.2–1)
BUN SERPL-MCNC: 9 MG/DL (ref 6–20)
BUN/CREAT SERPL: 16 (ref 12–20)
CALCIUM SERPL-MCNC: 7.7 MG/DL (ref 8.5–10.1)
CHLORIDE SERPL-SCNC: 106 MMOL/L (ref 97–108)
CHOLEST SERPL-MCNC: 105 MG/DL
CO2 SERPL-SCNC: 22 MMOL/L (ref 21–32)
CREAT SERPL-MCNC: 0.57 MG/DL (ref 0.7–1.3)
DIFFERENTIAL METHOD BLD: ABNORMAL
EOSINOPHIL # BLD: 0.1 K/UL (ref 0–0.4)
EOSINOPHIL NFR BLD: 1 % (ref 0–7)
ERYTHROCYTE [DISTWIDTH] IN BLOOD BY AUTOMATED COUNT: 13.9 % (ref 11.5–14.5)
GLOBULIN SER CALC-MCNC: 2.9 G/DL (ref 2–4)
GLUCOSE SERPL-MCNC: 97 MG/DL (ref 65–100)
HCT VFR BLD AUTO: 25.2 % (ref 36.6–50.3)
HDLC SERPL-MCNC: 19 MG/DL
HDLC SERPL: 5.5 {RATIO} (ref 0–5)
HGB BLD-MCNC: 8.5 G/DL (ref 12.1–17)
LACTATE SERPL-SCNC: 1 MMOL/L (ref 0.4–2)
LDLC SERPL CALC-MCNC: 56.4 MG/DL (ref 0–100)
LIPASE SERPL-CCNC: 723 U/L (ref 73–393)
LIPID PROFILE,FLP: ABNORMAL
LYMPHOCYTES # BLD: 0.6 K/UL (ref 0.8–3.5)
LYMPHOCYTES NFR BLD: 4 % (ref 12–49)
MAGNESIUM SERPL-MCNC: 2 MG/DL (ref 1.6–2.4)
MCH RBC QN AUTO: 32.4 PG (ref 26–34)
MCHC RBC AUTO-ENTMCNC: 33.7 G/DL (ref 30–36.5)
MCV RBC AUTO: 96.2 FL (ref 80–99)
METAMYELOCYTES NFR BLD MANUAL: 1 %
MONOCYTES # BLD: 0.6 K/UL (ref 0–1)
MONOCYTES NFR BLD: 4 % (ref 5–13)
MYELOCYTES NFR BLD MANUAL: 1 %
NEUTS SEG # BLD: 13.1 K/UL (ref 1.8–8)
NEUTS SEG NFR BLD: 89 % (ref 32–75)
NRBC BLD-RTO: 2 PER 100 WBC
PHOSPHATE SERPL-MCNC: 2.6 MG/DL (ref 2.6–4.7)
PLATELET # BLD AUTO: 226 K/UL (ref 150–400)
POTASSIUM SERPL-SCNC: 3.2 MMOL/L (ref 3.5–5.1)
PROT SERPL-MCNC: 5.4 G/DL (ref 6.4–8.2)
RBC # BLD AUTO: 2.62 M/UL (ref 4.1–5.7)
RBC MORPH BLD: ABNORMAL
SODIUM SERPL-SCNC: 138 MMOL/L (ref 136–145)
TRIGL SERPL-MCNC: 148 MG/DL (ref ?–150)
VLDLC SERPL CALC-MCNC: 29.6 MG/DL
WBC # BLD AUTO: 14.7 K/UL (ref 4.1–11.1)

## 2017-08-30 PROCEDURE — G8987 SELF CARE CURRENT STATUS: HCPCS

## 2017-08-30 PROCEDURE — 80053 COMPREHEN METABOLIC PANEL: CPT | Performed by: FAMILY MEDICINE

## 2017-08-30 PROCEDURE — 80061 LIPID PANEL: CPT | Performed by: FAMILY MEDICINE

## 2017-08-30 PROCEDURE — 74011250636 HC RX REV CODE- 250/636: Performed by: SURGERY

## 2017-08-30 PROCEDURE — 97530 THERAPEUTIC ACTIVITIES: CPT

## 2017-08-30 PROCEDURE — A9537 TC99M MEBROFENIN: HCPCS

## 2017-08-30 PROCEDURE — 74011250637 HC RX REV CODE- 250/637: Performed by: NURSE PRACTITIONER

## 2017-08-30 PROCEDURE — 97165 OT EVAL LOW COMPLEX 30 MIN: CPT

## 2017-08-30 PROCEDURE — 83690 ASSAY OF LIPASE: CPT | Performed by: FAMILY MEDICINE

## 2017-08-30 PROCEDURE — 74011250636 HC RX REV CODE- 250/636: Performed by: HOSPITALIST

## 2017-08-30 PROCEDURE — 65270000032 HC RM SEMIPRIVATE

## 2017-08-30 PROCEDURE — 36415 COLL VENOUS BLD VENIPUNCTURE: CPT | Performed by: FAMILY MEDICINE

## 2017-08-30 PROCEDURE — 74011250636 HC RX REV CODE- 250/636: Performed by: NURSE PRACTITIONER

## 2017-08-30 PROCEDURE — 83735 ASSAY OF MAGNESIUM: CPT | Performed by: FAMILY MEDICINE

## 2017-08-30 PROCEDURE — 85025 COMPLETE CBC W/AUTO DIFF WBC: CPT | Performed by: FAMILY MEDICINE

## 2017-08-30 PROCEDURE — 74011000258 HC RX REV CODE- 258: Performed by: FAMILY MEDICINE

## 2017-08-30 PROCEDURE — 74011250637 HC RX REV CODE- 250/637: Performed by: HOSPITALIST

## 2017-08-30 PROCEDURE — G8988 SELF CARE GOAL STATUS: HCPCS

## 2017-08-30 PROCEDURE — 84100 ASSAY OF PHOSPHORUS: CPT | Performed by: FAMILY MEDICINE

## 2017-08-30 PROCEDURE — 74011000258 HC RX REV CODE- 258: Performed by: HOSPITALIST

## 2017-08-30 PROCEDURE — 74011250636 HC RX REV CODE- 250/636: Performed by: FAMILY MEDICINE

## 2017-08-30 RX ORDER — SODIUM CHLORIDE 0.9 % (FLUSH) 0.9 %
5-10 SYRINGE (ML) INJECTION EVERY 8 HOURS
Status: DISCONTINUED | OUTPATIENT
Start: 2017-08-30 | End: 2017-09-03 | Stop reason: HOSPADM

## 2017-08-30 RX ORDER — ATENOLOL 50 MG/1
50 TABLET ORAL DAILY
Status: DISCONTINUED | OUTPATIENT
Start: 2017-08-30 | End: 2017-09-03 | Stop reason: HOSPADM

## 2017-08-30 RX ORDER — SODIUM CHLORIDE 9 MG/ML
25 INJECTION, SOLUTION INTRAVENOUS
Status: COMPLETED | OUTPATIENT
Start: 2017-08-30 | End: 2017-08-30

## 2017-08-30 RX ORDER — DEXTROMETHORPHAN POLISTIREX 30 MG/5 ML
SUSPENSION, EXTENDED RELEASE 12 HR ORAL AS NEEDED
Status: DISCONTINUED | OUTPATIENT
Start: 2017-08-30 | End: 2017-08-30

## 2017-08-30 RX ORDER — POTASSIUM CHLORIDE AND SODIUM CHLORIDE 900; 300 MG/100ML; MG/100ML
INJECTION, SOLUTION INTRAVENOUS CONTINUOUS
Status: DISCONTINUED | OUTPATIENT
Start: 2017-08-30 | End: 2017-09-03

## 2017-08-30 RX ORDER — AMLODIPINE BESYLATE 5 MG/1
5 TABLET ORAL DAILY
Status: DISCONTINUED | OUTPATIENT
Start: 2017-08-30 | End: 2017-09-03 | Stop reason: HOSPADM

## 2017-08-30 RX ORDER — DEXTROMETHORPHAN POLISTIREX 30 MG/5 ML
SUSPENSION, EXTENDED RELEASE 12 HR ORAL
Status: COMPLETED | OUTPATIENT
Start: 2017-08-30 | End: 2017-08-30

## 2017-08-30 RX ORDER — SODIUM CHLORIDE 0.9 % (FLUSH) 0.9 %
10 SYRINGE (ML) INJECTION
Status: COMPLETED | OUTPATIENT
Start: 2017-08-30 | End: 2017-08-30

## 2017-08-30 RX ORDER — METOCLOPRAMIDE HYDROCHLORIDE 5 MG/ML
10 INJECTION INTRAMUSCULAR; INTRAVENOUS EVERY 6 HOURS
Status: DISCONTINUED | OUTPATIENT
Start: 2017-08-30 | End: 2017-09-03

## 2017-08-30 RX ORDER — FACIAL-BODY WIPES
10 EACH TOPICAL
Status: DISCONTINUED | OUTPATIENT
Start: 2017-08-30 | End: 2017-08-30

## 2017-08-30 RX ORDER — POTASSIUM CHLORIDE 7.45 MG/ML
10 INJECTION INTRAVENOUS
Status: COMPLETED | OUTPATIENT
Start: 2017-08-30 | End: 2017-08-30

## 2017-08-30 RX ORDER — ONDANSETRON 2 MG/ML
4 INJECTION INTRAMUSCULAR; INTRAVENOUS
Status: DISCONTINUED | OUTPATIENT
Start: 2017-08-30 | End: 2017-09-03 | Stop reason: HOSPADM

## 2017-08-30 RX ORDER — SODIUM CHLORIDE 0.9 % (FLUSH) 0.9 %
5-10 SYRINGE (ML) INJECTION AS NEEDED
Status: DISCONTINUED | OUTPATIENT
Start: 2017-08-30 | End: 2017-09-03 | Stop reason: HOSPADM

## 2017-08-30 RX ADMIN — POTASSIUM CHLORIDE 10 MEQ: 10 INJECTION, SOLUTION INTRAVENOUS at 12:30

## 2017-08-30 RX ADMIN — MINERAL OIL: 100 OIL RECTAL at 20:01

## 2017-08-30 RX ADMIN — ATENOLOL 50 MG: 50 TABLET ORAL at 10:44

## 2017-08-30 RX ADMIN — Medication 10 ML: at 21:57

## 2017-08-30 RX ADMIN — POTASSIUM CHLORIDE 10 MEQ: 10 INJECTION, SOLUTION INTRAVENOUS at 14:00

## 2017-08-30 RX ADMIN — PIPERACILLIN SODIUM,TAZOBACTAM SODIUM 3.38 G: 3; .375 INJECTION, POWDER, FOR SOLUTION INTRAVENOUS at 16:00

## 2017-08-30 RX ADMIN — Medication 10 ML: at 11:15

## 2017-08-30 RX ADMIN — POTASSIUM CHLORIDE 10 MEQ: 10 INJECTION, SOLUTION INTRAVENOUS at 01:59

## 2017-08-30 RX ADMIN — AMLODIPINE BESYLATE 5 MG: 5 TABLET ORAL at 10:45

## 2017-08-30 RX ADMIN — SODIUM CHLORIDE 25 ML: 900 INJECTION, SOLUTION INTRAVENOUS at 12:20

## 2017-08-30 RX ADMIN — SINCALIDE 1.4 MCG: 5 INJECTION, POWDER, LYOPHILIZED, FOR SOLUTION INTRAVENOUS at 12:20

## 2017-08-30 RX ADMIN — METOCLOPRAMIDE 10 MG: 5 INJECTION, SOLUTION INTRAMUSCULAR; INTRAVENOUS at 18:00

## 2017-08-30 RX ADMIN — PIPERACILLIN SODIUM,TAZOBACTAM SODIUM 3.38 G: 3; .375 INJECTION, POWDER, FOR SOLUTION INTRAVENOUS at 21:59

## 2017-08-30 RX ADMIN — Medication 10 ML: at 03:26

## 2017-08-30 RX ADMIN — Medication 10 ML: at 16:00

## 2017-08-30 RX ADMIN — SODIUM CHLORIDE AND POTASSIUM CHLORIDE: 9; 2.98 INJECTION, SOLUTION INTRAVENOUS at 13:30

## 2017-08-30 RX ADMIN — PIPERACILLIN SODIUM,TAZOBACTAM SODIUM 3.38 G: 3; .375 INJECTION, POWDER, FOR SOLUTION INTRAVENOUS at 07:15

## 2017-08-30 RX ADMIN — POTASSIUM CHLORIDE 10 MEQ: 10 INJECTION, SOLUTION INTRAVENOUS at 17:30

## 2017-08-30 RX ADMIN — METOCLOPRAMIDE 10 MG: 5 INJECTION, SOLUTION INTRAMUSCULAR; INTRAVENOUS at 23:21

## 2017-08-30 RX ADMIN — POTASSIUM CHLORIDE 10 MEQ: 10 INJECTION, SOLUTION INTRAVENOUS at 03:25

## 2017-08-30 NOTE — PROGRESS NOTES
Primary Nurse Candy Garcia RN and Ananth Shin RN performed a dual skin assessment on this patient Impairment noted- see below. Chuck score is 20  Patient has scattered bruising from recent lower back surgery and hospitalization. Patient also has incision to lower back closed with topical surgical glue. Dressing re-applied.

## 2017-08-30 NOTE — PROGRESS NOTES
Problem: Falls - Risk of  Goal: *Absence of Falls  Document Brianda Fall Risk and appropriate interventions in the flowsheet.    Outcome: Progressing Towards Goal  Fall Risk Interventions:  Mobility Interventions: Patient to call before getting OOB, Utilize walker, cane, or other assitive device, Utilize gait belt for transfers/ambulation           Medication Interventions: Patient to call before getting OOB

## 2017-08-30 NOTE — PROGRESS NOTES
TRANSFER - IN REPORT:    Verbal report received from [address], RN(name) on [employer]  being received from ED(unit) for routine progression of care      Report consisted of patients Situation, Background, Assessment and   Recommendations(SBAR). Information from the following report(s) SBAR, Kardex and ED Summary was reviewed with the receiving nurse. Opportunity for questions and clarification was provided. Assessment completed upon patients arrival to unit and care assumed.

## 2017-08-30 NOTE — PROGRESS NOTES
Problem: Self Care Deficits Care Plan (Adult)  Goal: *Acute Goals and Plan of Care (Insert Text)  Occupational Therapy Goals  Initiated 8/30/2017    1. Patient will perform lower body dressing with modified independence using AE PRN within 7 days. 2. Patient will perform bathing with supervision/set-up using most appropriate DME within 7 days. 3. Patient will toileting at modified independence within 7 days. 4. Patient will don/doff back brace at [place] within 7 days. 5. Patient will verbalize/demonstrate 3/3 back precautions during ADL tasks without cues within 7 days. OCCUPATIONAL THERAPY EVALUATION  Patient: Prabhu Alves (73 y.o. male)  Date: 8/30/2017  Primary Diagnosis: Acute pancreatitis  Generalized abdominal pain  Colitis, acute  Ileus (HCC)        Precautions: r/o c-diff enteric precautions placed during OT session,  Fall, Back (vista TLSO)      ASSESSMENT :  Based on the objective data described below, the patient presents with minimal back pain 3/10, abdominal distention and minimal pain, fatigue, impaired activity tolerance and standing balance, back precautions (recent surgery 8/23/17), decreased functional reach to feet, following admission for abdominal pain. Pt is overall supervision/set-up to modA for self-care tasks, Lea for bed mobility via log roll technique. Pt able to recall 3/3 back precautions and maintain during ADLs with min cues. 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 Pt was able to don vista TLSO brace with Lea and completed functional mobility using RW with bathroom doorway with overall Lea, noted impaired dynamic standing balance. Anticipate pt to discharge home with wife assistance, may benefit from [facility]. Next session: AE training for LB ADLs, bathroom mobility, RW safety.      NOTE: quarter size blister-redness/purpleness on L heel; heels floated at end of session with ankita and RN, Baby Newer, notified. Also of note, pt wife expressing concern with LLE circulation and requesting doppler; discussed with RN. Patient will benefit from skilled intervention to address the above impairments. Patients rehabilitation potential is considered to be Good  Factors which may influence rehabilitation potential include:   [X]             None noted  [ ]             Mental ability/status  [ ]             Medical condition  [ ]             Home/family situation and support systems  [ ]             Safety awareness  [ ]             Pain tolerance/management  [ ]             Other:        PLAN :  Recommendations and Planned Interventions:  [X]               Self Care Training                  [X]        Therapeutic Activities  [X]               Functional Mobility Training    [ ]        Cognitive Retraining  [X]               Therapeutic Exercises           [X]        Endurance Activities  [X]               Balance Training                   [ ]        Neuromuscular Re-Education  [ ]               Visual/Perceptual Training     [X]   Home Safety Training  [X]               Patient Education                 [X]        Family Training/Education  [ ]               Other (comment):      Frequency/Duration: Patient will be followed by occupational therapy 5 times a week to address goals. Discharge Recommendations: Home Health  Further Equipment Recommendations for Discharge: TBD       SUBJECTIVE:   Patient stated Do I have to get up now?       OBJECTIVE DATA SUMMARY:   HISTORY:   Past Medical History:   Diagnosis Date    Arthritis      CAD (coronary artery disease)       5 STENTS,  3 INITIAL 1997    Fatty liver       >40 YEARS    Heart failure ([facility] [address] 2000     ACUTE POST HIP REPLACEMENT     Hypertension      MI (myocardial infarction) ([facility] [address]       2000    Scoliosis       Past Surgical History:   Procedure Laterality Date    CARDIAC SURG PROCEDURE UNLIST         stent placement    HX CATARACT REMOVAL Bilateral      HX HEART CATHETERIZATION   1997    HX HERNIA REPAIR   1980     INGUINAL    HX PROSTATECTOMY   2007     NO CANCER, ELEVATED PSA    HX ROTATOR CUFF REPAIR Left 2010    HX TONSILLECTOMY        REVISE TOTAL HIP REPLACEMENT Left 2004    TOTAL HIP ARTHROPLASTY Left 2000        Prior Level of Function/Home Situation: Pt with recent back surgery 8/23/17. Lives with wife. Discharged with RW and wife was assisting with vista TLSO and LB dressing (was home ~24 hours). Expanded or extensive additional review of patient history:      Home Situation  Home Environment:  (condo)  One/Two Story Residence: Two story  # of Interior Steps: 4  Living Alone: No  Support Systems: Child(jared), Spouse/Significant Other/Partner  Patient Expects to be Discharged to[de-identified] Private residence  Current DME Used/Available at Home: Brace/Splint, Walker, rolling  Tub or Shower Type: Tub/Shower combination  [X]  Right hand dominant             [ ]  Left hand dominant     EXAMINATION OF PERFORMANCE DEFICITS:  Cognitive/Behavioral Status:  Neurologic State: Alert; Appropriate for age  Orientation Level: Oriented X4  Cognition: Follows commands;Decreased attention/concentration; Appropriate decision making  Perception: Appears intact  Perseveration: No perseveration noted  Safety/Judgement: Awareness of environment; Fall prevention     Skin: appears intact     Edema: none noted in BUEs     Hearing: Auditory  Auditory Impairment: None     Vision/Perceptual:    Tracking: Able to track stimulus in all quadrants w/o difficulty                 Diplopia: No               Range of Motion:     AROM: Generally decreased, functional  PROM: Within functional limits                       Strength:     Strength: Generally decreased, functional                 Coordination:  Coordination: Within functional limits  Fine Motor Skills-Upper: Left Intact; Right Intact    Gross Motor Skills-Upper: Left Intact; Right Intact     Tone & Sensation: Tone: Normal  Sensation: Intact                       Balance:  Sitting: Intact  Standing: Impaired; With support  Standing - Static: Good  Standing - Dynamic : Fair     Functional Mobility and Transfers for ADLs:  Bed Mobility:  Supine to Sit: Minimum assistance; Additional time;Assist x1  Sit to Supine: Minimum assistance; Additional time;Assist x1     Transfers:  Sit to Stand: Minimum assistance; Additional time;Assist x1  Stand to Sit: Minimum assistance; Additional time;Assist x1  Toilet Transfer : Minimum assistance; Adaptive equipment; Additional time;Assist x1     ADL Assessment:  Feeding: Independent     Oral Facial Hygiene/Grooming: Modified Independent     Bathing: Moderate assistance; Adaptive equipment; Additional time;Assist x1     Upper Body Dressing: Minimum assistance; Adaptive equipment; Additional time;Assist x1     Lower Body Dressing: Minimum assistance; Adaptive equipment; Additional time;Assist x1     Toileting: Minimum assistance; Adaptive equipment; Additional time;Assist x1                 ADL Intervention and task modifications:     Anthony Tate to don, supervision to doff     LB dressing  Shoes supervision     Cognitive Retraining  Safety/Judgement: Awareness of environment; Fall prevention     Functional Measure:  Barthel Index:      Bathin  Bladder: 10  Bowels: 10  Groomin  Dressin  Feeding: 10  Mobility: 0  Stairs: 0  Toilet Use: 5  Transfer (Bed to Chair and Back): 10  Total: 55         Barthel and G-code impairment scale:  Percentage of impairment CH  0% CI  1-19% CJ  20-39% CK  40-59% CL  60-79% CM  80-99% CN  100%   Barthel Score 0-100 100 99-80 79-60 59-40 20-39 1-19    0   Barthel Score 0-20 20 17-19 13-16 9-12 5-8 1-4 0      The Barthel ADL Index: Guidelines  1. The index should be used as a record of what a patient does, not as a record of what a patient could do.   2. The main aim is to establish degree of independence from any help, physical or verbal, however minor and for whatever reason. 3. The need for supervision renders the patient not independent. 4. A patient's performance should be established using the best available evidence. Asking the patient, friends/relatives and nurses are the usual sources, but direct observation and common sense are also important. However direct testing is not needed. 5. Usually the patient's performance over the preceding 24-48 hours is important, but occasionally longer periods will be relevant. 6. Middle categories imply that the patient supplies over 50 per cent of the effort. 7. Use of aids to be independent is allowed. Rubie Osler., Barthel, D.W. (2399). Functional evaluation: the Barthel Index. [address] [facility] (14)2. Jim Villatoro jessica MARGRET Gregory, Judith Davis., Deborah Baum., Dat, [address] (1999). Measuring the change indisability after inpatient rehabilitation; comparison of the responsiveness of the Barthel Index and Functional Jenkins Measure. Journal of Neurology, Neurosurgery, and Psychiatry, 66(4), 428-810. Santi Mccracken, [place].KIRIT, KIMBERLY Babin, & Teri May MDamarisA. (2004.) Assessment of post-stroke quality of life in cost-effectiveness studies: The usefulness of the Barthel Index and the EuroQoL-5D. Quality of Life Research, 13, 316-09            G codes: In compliance with CMSs Claims Based Outcome Reporting, the following G-code set was chosen for this patient based on their primary functional limitation being treated: The outcome measure chosen to determine the severity of the functional limitation was the Barthel Inedx with a score of 55/100 which was correlated with the impairment scale.       · Self Care:               - CURRENT STATUS:    CK - 40%-59% impaired, limited or restricted               - GOAL STATUS:           CI - 1%-19% impaired, limited or restricted               - D/C STATUS:                       ---------------To be determined---------------      Occupational Therapy Evaluation Charge Determination   History Examination Decision-Making   LOW Complexity : Brief history review  LOW Complexity : 1-3 performance deficits relating to physical, cognitive , or psychosocial skils that result in activity limitations and / or participation restrictions  LOW Complexity : No comorbidities that affect functional and no verbal or physical assistance needed to complete eval tasks       Based on the above components, the patient evaluation is determined to be of the following complexity level: LOW      Activity Tolerance:   VSS     Please refer to the flowsheet for vital signs taken during this treatment. After treatment:   [ ] Patient left in no apparent distress sitting up in chair  [X] Patient left in no apparent distress in bed  [X] Call bell left within reach  [X] Nursing notified  [X] Caregiver present  [ ] Bed alarm activated      COMMUNICATION/EDUCATION:   The patients plan of care was discussed with: Registered Nurse.  [X] Home safety education was provided and the patient/caregiver indicated understanding. [X] Patient/family have participated as able in goal setting and plan of care. [X] Patient/family agree to work toward stated goals and plan of care. [ ] Patient understands intent and goals of therapy, but is neutral about his/her participation. [ ] Patient is unable to participate in goal setting and plan of care. This patients plan of care is appropriate for delegation to [facility].      Thank you for this referral.  Karsten Calvin OT  Time Calculation: 42 mins

## 2017-08-30 NOTE — CONSULTS
ORTHO CONSULT NOTE    Subjective:     Date of Consultation:  August 30, 2017      Flavio Amador is a 78 y.o. male who is being seen for lethargy, abdominal discofort with distension, nausea, and vomiting S/P L1-L5 decompression and instrumented fusion by Dr. Amie Woody on 8/23/17. He was discharged on 8/27 and has been fairly miserable with above complaints since. Home health reported an Hgb of 9 and his PCP recommended evaluation in ED. Workup has revealed acute pancreatitis, ileus, colitis and cirrhosis of the liver. He denies any fever, chills, dysesthesia's, wound drainage, or increasing back pain since his surgery. Orthopaedic Spine service consulted since he is in his post-operative window.     Patient Active Problem List    Diagnosis Date Noted    Acute pancreatitis 08/29/2017    Ileus ([address] 08/29/2017    Generalized abdominal pain 08/29/2017    Colitis, acute 08/29/2017    Spinal stenosis of lumbar region 08/23/2017    Scoliosis 08/23/2017     Family History   Problem Relation Age of Onset    Other Mother      PAD    Coronary Artery Disease Father      CABG    Alzheimer Father     Breast Cancer Sister     Other Sister      AAA    Anesth Problems Neg Hx       Social History   Substance Use Topics    Smoking status: Current Some Day Smoker    Smokeless tobacco: Never Used      Comment: cigar DAILY    Alcohol use 8.4 oz/week     14 Shots of liquor per week     Past Medical History:   Diagnosis Date    Arthritis     CAD (coronary artery disease)     5 STENTS,  3 INITIAL 1997    Fatty liver     >40 YEARS    Heart failure ([address] 2000    ACUTE POST HIP REPLACEMENT     Hypertension     MI (myocardial infarction) ([address]     2000    Scoliosis       Past Surgical History:   Procedure Laterality Date    CARDIAC SURG PROCEDURE UNLIST      stent placement    HX CATARACT REMOVAL Bilateral     HX HEART CATHETERIZATION  1997    HX HERNIA REPAIR  1980    INGUINAL    HX PROSTATECTOMY  2007    NO CANCER, ELEVATED PSA    HX ROTATOR CUFF REPAIR Left     HX TONSILLECTOMY      REVISE TOTAL HIP REPLACEMENT Left     TOTAL HIP ARTHROPLASTY Left       Prior to Admission medications    Medication Sig Start Date End Date Taking? Authorizing Provider   HYDROcodone-acetaminophen (NORCO) 5-325 mg per tablet Take 1 Tab by mouth every four (4) hours as needed for Pain. Max Daily Amount: 6 Tabs. 17   Wendy Saxena MD   traMADol (ULTRAM) 50 mg tablet Take 1-2 Tabs by mouth every six (6) hours as needed. Max Daily Amount: 400 mg. 17   Andrew Saxena NP   amLODIPine (NORVASC) 5 mg tablet TAKE 1 TABLET BY MOUTH EVERY DAY 17   Historical Provider   atenolol (TENORMIN) 50 mg tablet Take 50 mg by mouth daily. Historical Provider   pravastatin (PRAVACHOL) 40 mg tablet Take 40 mg by mouth nightly. Historical Provider     Current Facility-Administered Medications   Medication Dose Route Frequency    sodium chloride (NS) flush 5-10 mL  5-10 mL IntraVENous Q8H    sodium chloride (NS) flush 5-10 mL  5-10 mL IntraVENous PRN    ondansetron (ZOFRAN) injection 4 mg  4 mg IntraVENous Q6H PRN    piperacillin-tazobactam (ZOSYN) 3.375 g in 0.9% sodium chloride (MBP/ADV) 100 mL  3.375 g IntraVENous Q8H    0.9% sodium chloride infusion  125 mL/hr IntraVENous CONTINUOUS      No Known Allergies     Review of Systems:  A comprehensive review of systems was negative except for that written in the HPI. Mental Status:     Objective:     Patient Vitals for the past 8 hrs:   BP Temp Pulse Resp SpO2 Weight   17 0831 135/82 98.6 °F (37 °C) 75 16 95 % -   17 0715 - - - - - 69.9 kg (154 lb 1.6 oz)   17 0443 136/71 98 °F (36.7 °C) 69 15 95 % -   17 0132 128/76 98.5 °F (36.9 °C) 67 16 95 % -     Temp (24hrs), Av °F (36.7 °C), Min:97.6 °F (36.4 °C), Max:98.6 °F (37 °C)      EXAM: fatigued, cooperative, mild distress, appears stated age,   Neuro: no focal deficits.   Abdomen:  Distended, diffuse tenderness, negative Carreno or Mcburney's point tenderness  Extremities:  FROM and strength throughout. Spine: ABD pad in place, no drainage, dehiscence or signs of infection. No fluctuance. Imaging Review:        EXAM:  CT ABD PELV W CONT     INDICATION: Leukocytosis and anemia. Lumbar spine surgery recently, discharge  from the hospital 2 days ago.     COMPARISON: No comparison CT. Ultrasound right upper quadrant abdomen earlier  today at 1859 hours.     CONTRAST:  100 mL of Isovue-370.     TECHNIQUE:   Following the uneventful intravenous administration of contrast, thin axial  images were obtained through the abdomen and pelvis. Coronal and sagittal  reconstructions were generated. Oral contrast was not administered. CT dose  reduction was achieved through use of a standardized protocol tailored for this  examination and automatic exposure control for dose modulation. Adaptive  statistical iterative reconstruction (ASIR) was utilized.     FINDINGS:   LUNG BASES: Trace bilateral pleural effusions. No basilar pneumonia. INCIDENTALLY IMAGED HEART AND MEDIASTINUM: Normal cardiac size. Coronary artery  calcific atherosclerosis includes the left main coronary artery. LIVER: Hypoattenuation may represent hepatic steatosis. Liver surface is mildly  nodular. Subcapsular enhancement in segment 2 may be perfusional. Small cyst is  in segment 8. Portal vein is patent. GALLBLADDER: Nondistended. Small gallstone in the neck. CBD is not dilated. SPLEEN: 12.3 cm in length. No infarct. PANCREAS: No mass or ductal dilatation. No evidence of pancreatitis. ADRENALS: Unremarkable. KIDNEYS: No mass, calculus, or hydronephrosis. STOMACH: Nondistended. SMALL BOWEL: No dilatation or wall thickening. COLON: Gaseous distention of the transverse colon measures up to 8.5 cm. No  obstruction. Mural thickening of the proximal ascending colon is nonspecific  since it is nondistended. APPENDIX: Unremarkable.   PERITONEUM: Trace ascites. No pneumoperitoneum. No peritoneal mass. RETROPERITONEUM: Aorta is atherosclerotic without aneurysm. No lymphadenopathy. REPRODUCTIVE ORGANS: Prostate gland is not visible. URINARY BLADDER: Partially extends into a prominently fat-containing left  inguinal hernia. No evidence of mass. BONES: L1-L5 posterior fusion. Bone graft material and paraspinal fluid are  expected post surgery. Left iliac bone graft harvest site has adjacent fluid,  including extension into the left gluteus medius muscle. ADDITIONAL COMMENTS: N/A     IMPRESSION  IMPRESSION:     1. Gaseous colonic distention is most likely ileus. Proximal colonic mural  thickening represents nondistention versus mild colitis. 2. Trace ascites. No abscess. 3. Nodular liver and splenomegaly may represent cirrhosis. 4. Postsurgical lumbar spine and left iliac bone. No measurable abscess. No  measurable hematoma. Labs:   Recent Results (from the past 24 hour(s))   CBC WITH AUTOMATED DIFF    Collection Time: 08/29/17  6:00 PM   Result Value Ref Range    WBC 18.9 (H) 4.1 - 11.1 K/uL    RBC 3.20 (L) 4.10 - 5.70 M/uL    HGB 10.4 (L) 12.1 - 17.0 g/dL    HCT 30.9 (L) 36.6 - 50.3 %    MCV 96.6 80.0 - 99.0 FL    MCH 32.5 26.0 - 34.0 PG    MCHC 33.7 30.0 - 36.5 g/dL    RDW 13.8 11.5 - 14.5 %    PLATELET 314 (H) 766 - 400 K/uL    NEUTROPHILS 77 (H) 32 - 75 %    BAND NEUTROPHILS 1 0 - 6 %    LYMPHOCYTES 8 (L) 12 - 49 %    MONOCYTES 11 5 - 13 %    EOSINOPHILS 0 0 - 7 %    BASOPHILS 0 0 - 1 %    METAMYELOCYTES 1 (H) 0 %    MYELOCYTES 2 (H) 0 %    ABS. NEUTROPHILS 14.7 (H) 1.8 - 8.0 K/UL    ABS. LYMPHOCYTES 1.5 0.8 - 3.5 K/UL    ABS. MONOCYTES 2.1 (H) 0.0 - 1.0 K/UL    ABS. EOSINOPHILS 0.0 0.0 - 0.4 K/UL    ABS.  BASOPHILS 0.0 0.0 - 0.1 K/UL    DF MANUAL      RBC COMMENTS POLYCHROMASIA  2+        RBC COMMENTS MACROCYTOSIS  1+       METABOLIC PANEL, COMPREHENSIVE    Collection Time: 08/29/17  6:00 PM   Result Value Ref Range    Sodium 134 (L) 136 - 145 mmol/L    Potassium 3.1 (L) 3.5 - 5.1 mmol/L    Chloride 97 97 - 108 mmol/L    CO2 26 21 - 32 mmol/L    Anion gap 11 5 - 15 mmol/L    Glucose 114 (H) 65 - 100 mg/dL    BUN 11 6 - 20 MG/DL    Creatinine 0.87 0.70 - 1.30 MG/DL    BUN/Creatinine ratio 13 12 - 20      GFR est AA >60 >60 ml/min/1.73m2    GFR est non-AA >60 >60 ml/min/1.73m2    Calcium 8.5 8.5 - 10.1 MG/DL    Bilirubin, total 1.8 (H) 0.2 - 1.0 MG/DL    ALT (SGPT) 112 (H) 12 - 78 U/L    AST (SGOT) 109 (H) 15 - 37 U/L    Alk.  phosphatase 137 (H) 45 - 117 U/L    Protein, total 6.7 6.4 - 8.2 g/dL    Albumin 2.9 (L) 3.5 - 5.0 g/dL    Globulin 3.8 2.0 - 4.0 g/dL    A-G Ratio 0.8 (L) 1.1 - 2.2     NUCLEATED RBC    Collection Time: 08/29/17  6:00 PM   Result Value Ref Range    NRBC 0.9 (H) 0  WBC    ABSOLUTE NRBC 0.17 (H) 0.00 - 0.01 K/uL    WBC CORRECTED FOR NR ADJUSTED FOR NUCLEATED RBC'S     LIPASE    Collection Time: 08/29/17  6:00 PM   Result Value Ref Range    Lipase 717 (H) 73 - 393 U/L   PROTHROMBIN TIME + INR    Collection Time: 08/29/17  6:00 PM   Result Value Ref Range    INR 1.3 (H) 0.9 - 1.1      Prothrombin time 13.7 (H) 9.0 - 11.1 sec   BILIRUBIN, FRACTIONATED    Collection Time: 08/29/17  6:00 PM   Result Value Ref Range    Bilirubin, total 1.8 (H) 0.2 - 1.0 MG/DL    Bilirubin, direct 0.8 (H) 0.0 - 0.2 MG/DL    Bilirubin, indirect 1.0 0 - 1.1 MG/DL   SED RATE (ESR)    Collection Time: 08/29/17  6:00 PM   Result Value Ref Range    Sed rate, automated 59 (H) 0 - 20 mm/hr   C REACTIVE PROTEIN, QT    Collection Time: 08/29/17  6:00 PM   Result Value Ref Range    C-Reactive protein 7.32 (H) 0.00 - 0.60 mg/dL   URINALYSIS W/ RFLX MICROSCOPIC    Collection Time: 08/29/17  7:49 PM   Result Value Ref Range    Color DARK YELLOW      Appearance CLOUDY (A) CLEAR      Specific gravity 1.022 1.003 - 1.030      pH (UA) 6.0 5.0 - 8.0      Protein NEGATIVE  NEG mg/dL    Glucose NEGATIVE  NEG mg/dL    Ketone NEGATIVE  NEG mg/dL    Blood NEGATIVE  NEG Urobilinogen 2.0 (H) 0.2 - 1.0 EU/dL    Nitrites NEGATIVE  NEG      Leukocyte Esterase TRACE (A) NEG      WBC 0-4 0 - 4 /hpf    RBC 0-5 0 - 5 /hpf    Epithelial cells FEW FEW /lpf    Bacteria NEGATIVE  NEG /hpf   BILIRUBIN, CONFIRM    Collection Time: 08/29/17  7:49 PM   Result Value Ref Range    Bilirubin UA, confirm POSITIVE (A) NEG     LACTIC ACID    Collection Time: 08/29/17 11:28 PM   Result Value Ref Range    Lactic acid 1.0 0.4 - 2.0 MMOL/L   METABOLIC PANEL, COMPREHENSIVE    Collection Time: 08/30/17  6:00 AM   Result Value Ref Range    Sodium 138 136 - 145 mmol/L    Potassium 3.2 (L) 3.5 - 5.1 mmol/L    Chloride 106 97 - 108 mmol/L    CO2 22 21 - 32 mmol/L    Anion gap 10 5 - 15 mmol/L    Glucose 97 65 - 100 mg/dL    BUN 9 6 - 20 MG/DL    Creatinine 0.57 (L) 0.70 - 1.30 MG/DL    BUN/Creatinine ratio 16 12 - 20      GFR est AA >60 >60 ml/min/1.73m2    GFR est non-AA >60 >60 ml/min/1.73m2    Calcium 7.7 (L) 8.5 - 10.1 MG/DL    Bilirubin, total 1.6 (H) 0.2 - 1.0 MG/DL    ALT (SGPT) 87 (H) 12 - 78 U/L    AST (SGOT) 87 (H) 15 - 37 U/L    Alk.  phosphatase 115 45 - 117 U/L    Protein, total 5.4 (L) 6.4 - 8.2 g/dL    Albumin 2.5 (L) 3.5 - 5.0 g/dL    Globulin 2.9 2.0 - 4.0 g/dL    A-G Ratio 0.9 (L) 1.1 - 2.2     LIPID PANEL    Collection Time: 08/30/17  6:00 AM   Result Value Ref Range    LIPID PROFILE          Cholesterol, total 105 <200 MG/DL    Triglyceride 148 <150 MG/DL    HDL Cholesterol PENDING MG/DL    LDL, calculated PENDING MG/DL    VLDL, calculated PENDING MG/DL    CHOL/HDL Ratio PENDING    LIPASE    Collection Time: 08/30/17  6:00 AM   Result Value Ref Range    Lipase 723 (H) 73 - 393 U/L   MAGNESIUM    Collection Time: 08/30/17  6:00 AM   Result Value Ref Range    Magnesium 2.0 1.6 - 2.4 mg/dL   PHOSPHORUS    Collection Time: 08/30/17  6:00 AM   Result Value Ref Range    Phosphorus 2.6 2.6 - 4.7 MG/DL   CBC WITH AUTOMATED DIFF    Collection Time: 08/30/17  6:00 AM   Result Value Ref Range    WBC 14. 7 (H) 4.1 - 11.1 K/uL    RBC 2.62 (L) 4.10 - 5.70 M/uL    HGB 8.5 (L) 12.1 - 17.0 g/dL    HCT 25.2 (L) 36.6 - 50.3 %    MCV 96.2 80.0 - 99.0 FL    MCH 32.4 26.0 - 34.0 PG    MCHC 33.7 30.0 - 36.5 g/dL    RDW 13.9 11.5 - 14.5 %    PLATELET 992 570 - 839 K/uL    NEUTROPHILS 89 (H) 32 - 75 %    LYMPHOCYTES 4 (L) 12 - 49 %    MONOCYTES 4 (L) 5 - 13 %    EOSINOPHILS 1 0 - 7 %    BASOPHILS 0 0 - 1 %    METAMYELOCYTES 1 (H) 0 %    MYELOCYTES 1 (H) 0 %    NRBC 2.0 (H) 0  WBC    ABS. NEUTROPHILS 13.1 (H) 1.8 - 8.0 K/UL    ABS. LYMPHOCYTES 0.6 (L) 0.8 - 3.5 K/UL    ABS. MONOCYTES 0.6 0.0 - 1.0 K/UL    ABS. EOSINOPHILS 0.1 0.0 - 0.4 K/UL    ABS. BASOPHILS 0.0 0.0 - 0.1 K/UL    DF SMEAR SCANNED      RBC COMMENTS POLYCHROMASIA  1+             Impression:     Patient Active Problem List    Diagnosis Date Noted    Acute pancreatitis 08/29/2017    [address]leus ([facility] [address] 08/29/2017    Generalized abdominal pain 08/29/2017    Colitis, acute 08/29/2017    Spinal stenosis of lumbar region 08/23/2017    Scoliosis 08/23/2017     Principal Problem:    Acute pancreatitis (8/29/2017)    Active Problems:    Ileus ([address] (8/29/2017)      Generalized abdominal pain (8/29/2017)      Colitis, acute (8/29/2017)        Plan:   Pt. Neurologically and Orthopaedically stable  Has post operative issues which do not involve his recent spine surgery. Continue further work up as needed. Will follow  Dr. Zeke Dumas aware and agrees with above plan.       IVONNE Ma

## 2017-08-30 NOTE — DISCHARGE INSTRUCTIONS
After Hospital Care Plan:  Discharge Instructions Lumbar Fusion Surgery   Dr. Hilda Cazares     Patient Name: Alan Hampton    Date of procedure: 8/23/2017      Date of discharge:     Procedure: Procedure(s):  ROBOTIC ASSISTED L1-L5 DECOMPRESSION FUSION (Yin Mcdaniels)  (OXYGEN)  PCP: Aurora Turner MD      Follow up appointments  -follow up with Dr. Hilda Cazares in 2 weeks. Call [phone] to make an appointment as soon as you get home from the hospital.    When to call your Orthopaedic Surgeon:  -Signs of infection-if your incision is red; continues to have drainage; drainage has a foul odor or if you have a persistent fever over 101 degrees for 24 hours  -Nausea or vomiting, severe headache  -Loss of bowel or bladder function, inability to urinate  -Changes in sensation in your arms or legs (numbness, tingling, loss of color)  -Increased weakness-greater than before your surgery  -Severe pain or pain not relieved by medications  -Signs of a blood clot in your leg-calf pain, tenderness, redness, swelling of lower leg    When to call your Primary Care Physician:  -Concerns about medical conditions such as diabetes, high blood pressure, asthma, congestive heart failure  -Call if blood sugars are elevated, persistent headache or dizziness, coughing or congestion, constipation or diarrhea, burning with urination, abnormal heart rate    When to call [phone] and go to the nearest emergency room:  -Acute onset of chest pain, shortness of breath, difficulty breathing    Activity  -You are going home a well person, be as active as possible. Your only exercise should be walking. Start with short frequent walks and increase your walking distance each day.  -Limit the amount of time you sit to 20-30 minute intervals.   Sitting for prolonged periods of time will be uncomfortable for you following surgery.  -Do NOT lift anything over 5 pounds  -Do NOT do any straining, twisting or bending  -When you are in bed, you may lay on your back or on either side. Do NOT lie on your stomach    Brace  -If you have a back brace, you should wear your brace at all times when you are out of bed. Do not wear the brace while in bed or showering.  -Remember to always wear a cotton t-shirt underneath your brace.  -Do not bend or twist when your brace is off    Diet  -Resume usual diet; drink plenty of fluids; eat foods high in fiber  -It is important to have regular bowel movements. Pain medications may cause constipation. You may want to take a stool softener (such as Senokot-S or Colace) to prevent constipation.   -If constipation occurs, take a laxative (such as Dulcolax tablets, Milk of Magnesia, or a suppository). Laxatives should only be used if the above preventable measures have failed and you still have not had a bowel movement after three days    Driving  -You may not drive or return to work until instructed by your physician. However, you may ride in the car for short periods of time. Incision Care  -You may take brief showers but do not run the water run directly onto the wound. After showering or bathing, remove the wet dressing and gently blot the wound dry with a soft towel.  -Do not rub or apply any lotions or ointments to your incision site.   -Do not soak or scrub your wound  -Keep a dry dressing (ABD and paper tape) on your incision and have it changed daily for 14 days after surgery; more often if your incision is draining. Have your caregiver wash their hands thoroughly before changing your dressing.  -You will have absorbable sutures and steristrips (white tape) on your incision. Leave the steristrips on until they fall off. Showering  -You may shower in approximately 4 days after your surgery.    -Leave the dressing on during your shower. Do NOT allow the water to run directly onto your dressing. Once you get out of the shower, put on a dry dressing.  -Reminder- your brace can be removed while showering.  Remember to not bend or twist while your brace is off.    -Do not take a tub bath. Preventing blood clots  -You have been given T.E.D. stockings to wear. Continue to wear these for 7 days after your discharge. Put them on in the morning and take them off at night.    -They are used to increase your circulation and prevent blood clots from forming in your legs  -T. E.D. stockings can be machine washed, temperature not to exceed 160° F (71°C) and machine dried for 15 to 20 minutes, temperature not to exceed 250° F (121°C). Pain management  -Take pain medication as prescribed; decrease the amount you use as your pain lessens  -DO not wait until you are in extreme pain to take your medication.  -Avoid alcoholic beverages while taking pain medication    Pain Medication Safety  DO:  -Read the Medication Guide   -Take your medicine exactly as prescribed   -Store your medicine away from children and in a safe place   -Flush unused medicine down the toilet   -Call your healthcare provider for medical advice about side effects. You may report side effects to FDA at 4-661-FDA-5891.   -Please be aware that many medications contain Tylenol. We do not want you to over medicate so please read the information below as a guide. Do not take more than 4 Grams of Tylenol in a 24 hour period.   (There are 1000 milligrams in one Gram)                                                                                                                                                                                                                                                                                                                                                                  Percocet contains 325 mg of Tylenol per tablet (do not take more than 12 tablets in 24 hours)  Lortab contains 500 mg of Tylenol per tablet (do not take more than 8 tablets in 24 hours)  Norco contains 325 mg of Tylenol per tablet (do not take more than 12 tablets in 24 hours). DO NOT:  -Do not give your medicine to others   -Do not take medicine unless it was prescribed for you   -Do not stop taking your medicine without talking to your healthcare provider   -Do not break, chew, crush, dissolve, or inject your medicine. If you cannot swallow your medicine whole, talk to your healthcare provider.  -Do not drink alcohol while taking this medicine  -Do not take anti-inflammatory medications or aspirin unless instructed by your      Physician.

## 2017-08-30 NOTE — PROGRESS NOTES
Progress Note    Patient: Kitty Maloney MRN: [id number]  SSN: [id number]    YOB: 1940  Age: 78 y.o. Sex: male      Admit Date: 2017    Abdominal distention    Subjective:     No acute surgical issues. Pt reported malaise has somewhat improved. WBC trending down but this is more likely dilutional.  No nausea or vomiting. Objective:     Visit Vitals    /82 (BP 1 Location: Right arm, BP Patient Position: At rest)    Pulse 75    Temp 98.6 °F (37 °C)    Resp 16    Ht 5' 6\" (1.676 m)    Wt 154 lb 1.6 oz (69.9 kg)    SpO2 95%    BMI 24.87 kg/m2       Temp (24hrs), Av °F (36.7 °C), Min:97.6 °F (36.4 °C), Max:98.6 °F (37 °C)        Physical Exam:    Gen:  NAD  Pulm:  Unlabored  Abd:  S/distended/mild diffuse TTP/no guarding or rebound  Back incision:  Fluctuant with tenderness    Recent Results (from the past 24 hour(s))   CBC WITH AUTOMATED DIFF    Collection Time: 17  6:00 PM   Result Value Ref Range    WBC 18.9 (H) 4.1 - 11.1 K/uL    RBC 3.20 (L) 4.10 - 5.70 M/uL    HGB 10.4 (L) 12.1 - 17.0 g/dL    HCT 30.9 (L) 36.6 - 50.3 %    MCV 96.6 80.0 - 99.0 FL    MCH 32.5 26.0 - 34.0 PG    MCHC 33.7 30.0 - 36.5 g/dL    RDW 13.8 11.5 - 14.5 %    PLATELET 074 (H) 234 - 400 K/uL    NEUTROPHILS 77 (H) 32 - 75 %    BAND NEUTROPHILS 1 0 - 6 %    LYMPHOCYTES 8 (L) 12 - 49 %    MONOCYTES 11 5 - 13 %    EOSINOPHILS 0 0 - 7 %    BASOPHILS 0 0 - 1 %    METAMYELOCYTES 1 (H) 0 %    MYELOCYTES 2 (H) 0 %    ABS. NEUTROPHILS 14.7 (H) 1.8 - 8.0 K/UL    ABS. LYMPHOCYTES 1.5 0.8 - 3.5 K/UL    ABS. MONOCYTES 2.1 (H) 0.0 - 1.0 K/UL    ABS. EOSINOPHILS 0.0 0.0 - 0.4 K/UL    ABS.  BASOPHILS 0.0 0.0 - 0.1 K/UL    DF MANUAL      RBC COMMENTS POLYCHROMASIA  2+        RBC COMMENTS MACROCYTOSIS  1+       METABOLIC PANEL, COMPREHENSIVE    Collection Time: 17  6:00 PM   Result Value Ref Range    Sodium 134 (L) 136 - 145 mmol/L    Potassium 3.1 (L) 3.5 - 5.1 mmol/L    Chloride 97 97 - 108 mmol/L    CO2 26 21 - 32 mmol/L    Anion gap 11 5 - 15 mmol/L    Glucose 114 (H) 65 - 100 mg/dL    BUN 11 6 - 20 MG/DL    Creatinine 0.87 0.70 - 1.30 MG/DL    BUN/Creatinine ratio 13 12 - 20      GFR est AA >60 >60 ml/min/1.73m2    GFR est non-AA >60 >60 ml/min/1.73m2    Calcium 8.5 8.5 - 10.1 MG/DL    Bilirubin, total 1.8 (H) 0.2 - 1.0 MG/DL    ALT (SGPT) 112 (H) 12 - 78 U/L    AST (SGOT) 109 (H) 15 - 37 U/L    Alk.  phosphatase 137 (H) 45 - 117 U/L    Protein, total 6.7 6.4 - 8.2 g/dL    Albumin 2.9 (L) 3.5 - 5.0 g/dL    Globulin 3.8 2.0 - 4.0 g/dL    A-G Ratio 0.8 (L) 1.1 - 2.2     NUCLEATED RBC    Collection Time: 08/29/17  6:00 PM   Result Value Ref Range    NRBC 0.9 (H) 0  WBC    ABSOLUTE NRBC 0.17 (H) 0.00 - 0.01 K/uL    WBC CORRECTED FOR NR ADJUSTED FOR NUCLEATED RBC'S     LIPASE    Collection Time: 08/29/17  6:00 PM   Result Value Ref Range    Lipase 717 (H) 73 - 393 U/L   PROTHROMBIN TIME + INR    Collection Time: 08/29/17  6:00 PM   Result Value Ref Range    INR 1.3 (H) 0.9 - 1.1      Prothrombin time 13.7 (H) 9.0 - 11.1 sec   BILIRUBIN, FRACTIONATED    Collection Time: 08/29/17  6:00 PM   Result Value Ref Range    Bilirubin, total 1.8 (H) 0.2 - 1.0 MG/DL    Bilirubin, direct 0.8 (H) 0.0 - 0.2 MG/DL    Bilirubin, indirect 1.0 0 - 1.1 MG/DL   SED RATE (ESR)    Collection Time: 08/29/17  6:00 PM   Result Value Ref Range    Sed rate, automated 59 (H) 0 - 20 mm/hr   C REACTIVE PROTEIN, QT    Collection Time: 08/29/17  6:00 PM   Result Value Ref Range    C-Reactive protein 7.32 (H) 0.00 - 0.60 mg/dL   URINALYSIS W/ RFLX MICROSCOPIC    Collection Time: 08/29/17  7:49 PM   Result Value Ref Range    Color DARK YELLOW      Appearance CLOUDY (A) CLEAR      Specific gravity 1.022 1.003 - 1.030      pH (UA) 6.0 5.0 - 8.0      Protein NEGATIVE  NEG mg/dL    Glucose NEGATIVE  NEG mg/dL    Ketone NEGATIVE  NEG mg/dL    Blood NEGATIVE  NEG      Urobilinogen 2.0 (H) 0.2 - 1.0 EU/dL    Nitrites NEGATIVE  NEG      Leukocyte Esterase TRACE (A) NEG      WBC 0-4 0 - 4 /hpf    RBC 0-5 0 - 5 /hpf    Epithelial cells FEW FEW /lpf    Bacteria NEGATIVE  NEG /hpf   BILIRUBIN, CONFIRM    Collection Time: 08/29/17  7:49 PM   Result Value Ref Range    Bilirubin UA, confirm POSITIVE (A) NEG     LACTIC ACID    Collection Time: 08/29/17 11:28 PM   Result Value Ref Range    Lactic acid 1.0 0.4 - 2.0 MMOL/L   METABOLIC PANEL, COMPREHENSIVE    Collection Time: 08/30/17  6:00 AM   Result Value Ref Range    Sodium 138 136 - 145 mmol/L    Potassium 3.2 (L) 3.5 - 5.1 mmol/L    Chloride 106 97 - 108 mmol/L    CO2 22 21 - 32 mmol/L    Anion gap 10 5 - 15 mmol/L    Glucose 97 65 - 100 mg/dL    BUN 9 6 - 20 MG/DL    Creatinine 0.57 (L) 0.70 - 1.30 MG/DL    BUN/Creatinine ratio 16 12 - 20      GFR est AA >60 >60 ml/min/1.73m2    GFR est non-AA >60 >60 ml/min/1.73m2    Calcium 7.7 (L) 8.5 - 10.1 MG/DL    Bilirubin, total 1.6 (H) 0.2 - 1.0 MG/DL    ALT (SGPT) 87 (H) 12 - 78 U/L    AST (SGOT) 87 (H) 15 - 37 U/L    Alk.  phosphatase 115 45 - 117 U/L    Protein, total 5.4 (L) 6.4 - 8.2 g/dL    Albumin 2.5 (L) 3.5 - 5.0 g/dL    Globulin 2.9 2.0 - 4.0 g/dL    A-G Ratio 0.9 (L) 1.1 - 2.2     LIPID PANEL    Collection Time: 08/30/17  6:00 AM   Result Value Ref Range    LIPID PROFILE          Cholesterol, total 105 <200 MG/DL    Triglyceride 148 <150 MG/DL    HDL Cholesterol 19 MG/DL    LDL, calculated 56.4 0 - 100 MG/DL    VLDL, calculated 29.6 MG/DL    CHOL/HDL Ratio 5.5 (H) 0 - 5.0     LIPASE    Collection Time: 08/30/17  6:00 AM   Result Value Ref Range    Lipase 723 (H) 73 - 393 U/L   MAGNESIUM    Collection Time: 08/30/17  6:00 AM   Result Value Ref Range    Magnesium 2.0 1.6 - 2.4 mg/dL   PHOSPHORUS    Collection Time: 08/30/17  6:00 AM   Result Value Ref Range    Phosphorus 2.6 2.6 - 4.7 MG/DL   CBC WITH AUTOMATED DIFF    Collection Time: 08/30/17  6:00 AM   Result Value Ref Range    WBC 14.7 (H) 4.1 - 11.1 K/uL    RBC 2.62 (L) 4.10 - 5.70 M/uL    HGB 8.5 (L) 12.1 - 17.0 g/dL    HCT 25.2 (L) 36.6 - 50.3 %    MCV 96.2 80.0 - 99.0 FL    MCH 32.4 26.0 - 34.0 PG    MCHC 33.7 30.0 - 36.5 g/dL    RDW 13.9 11.5 - 14.5 %    PLATELET 198 478 - 319 K/uL    NEUTROPHILS 89 (H) 32 - 75 %    LYMPHOCYTES 4 (L) 12 - 49 %    MONOCYTES 4 (L) 5 - 13 %    EOSINOPHILS 1 0 - 7 %    BASOPHILS 0 0 - 1 %    METAMYELOCYTES 1 (H) 0 %    MYELOCYTES 1 (H) 0 %    NRBC 2.0 (H) 0  WBC    ABS. NEUTROPHILS 13.1 (H) 1.8 - 8.0 K/UL    ABS. LYMPHOCYTES 0.6 (L) 0.8 - 3.5 K/UL    ABS. MONOCYTES 0.6 0.0 - 1.0 K/UL    ABS. EOSINOPHILS 0.1 0.0 - 0.4 K/UL    ABS. BASOPHILS 0.0 0.0 - 0.1 K/UL    DF SMEAR SCANNED      RBC COMMENTS POLYCHROMASIA  1+                 Assessment:     Hospital Problems  Date Reviewed: 8/30/2017          Codes Class Noted POA    * (Principal)Acute pancreatitis ICD-10-CM: K85.90  ICD-9-CM: 782.5  8/29/2017 Unknown        [facility] ICD-10-CM: K56.7  ICD-9-CM: 560.1  8/29/2017 Unknown        Generalized abdominal pain ICD-10-CM: R10.84  ICD-9-CM: 789.07  8/29/2017 Unknown        Colitis, acute ICD-10-CM: K52.9  ICD-9-CM: 558.9  8/29/2017 Unknown              Plan/Recommendations/Medical Decision Making:     - Abdominal distention: No acute indication for operation. Most likely post-operative ileus  - Small gallstone in gallbladder unlikely to be source of his septic picture.   Will obtain HIDA scan to evaluate for cholecystitis and biliary obstruction  - Continue antibiotic for now    Signed By: Poppy Britt MD     August 30, 2017

## 2017-08-30 NOTE — PROGRESS NOTES
Bedside and Verbal shift change report given to [address] (oncoming nurse) by Gloria Dailey RN (offgoing nurse). Report included the following information SBAR, Kardex, ED Summary, OR Summary, Procedure Summary, Intake/Output and Accordion.

## 2017-08-30 NOTE — CONSULTS
[facility].  [facility] [address], [address]  [phone]                     GI CONSULTATION NOTE  Jaleesa Delong, AGACN[facility]  Work Cell: [phone]      NAME:  Mickey Marie   :   1940   MRN:   [id number]       Referring Provider: Dr. Montano Dickerson Run Date: 2017     Chief Complaint: Fatigue, lethargy and abnormal labs    History of Present Illness:  Patient is a 78 y. o. who is seen in consultation at the request of Dr. Kobe Summers for cirrhosis/ileus/colitis/abdominal pain and pancreatitis. Mr. Ana Dallas has a PMH as detailed below including recent spinal fusion completed 17. He was discharged  and since this time has had progressive fatigue, lethargy and abdominal distention. He reports lower abdominal discomfort and nausea as well. Last BM was yesterday but stools have been loose, watery since surgery. He denies any melena or hematochezia. He had been taking Tramadol at home for pain. He has not been eating or drinking much. He denies any fevers or chills. Skilled nursing came to see him and did blood work at which time Hgb was low and WBC elevated. He was referred to the hospital for further evaluation. Work-up in ER revealed leukocytosis, anemia, elevated liver enzymes and lipase. Abdominal ultrasound demonstrated cholelithiasis and hepatic steatosis. Contrast CT scan demonstrated colonic distention most likely related to an ileus, proximal colonic mural thickening possibly nondistention vs mild colitis, trace ascites, nodular liver and splenomegaly which may represent cirrhosis. He reports a long-standing history of elevated liver enzymes (since age 38) due to hepatic steatosis. He has never had any additional work-up related to this. He also has a long-standing history of drinking 1-2 alcoholic drinks daily.        PMH:  Past Medical History:   Diagnosis Date    Arthritis     CAD (coronary artery disease)     5 STENTS,  3 INITIAL     Fatty liver     >40 YEARS    Heart failure ([facility] 2000    ACUTE POST HIP REPLACEMENT     Hypertension     MI (myocardial infarction) ([facility]     2000    Scoliosis        PSH:  Past Surgical History:   Procedure Laterality Date    CARDIAC SURG PROCEDURE UNLIST      stent placement    HX CATARACT REMOVAL Bilateral     HX HEART CATHETERIZATION  1997    HX HERNIA REPAIR  1980    INGUINAL    HX PROSTATECTOMY  2007    NO CANCER, ELEVATED PSA    HX ROTATOR CUFF REPAIR Left 2010    HX TONSILLECTOMY      REVISE TOTAL HIP REPLACEMENT Left 2004    TOTAL HIP ARTHROPLASTY Left 2000       Allergies:  No Known Allergies    Home Medications:  Prior to Admission Medications   Prescriptions Last Dose Informant Patient Reported? Taking? HYDROcodone-acetaminophen (NORCO) 5-325 mg per tablet   No No   Sig: Take 1 Tab by mouth every four (4) hours as needed for Pain. Max Daily Amount: 6 Tabs. amLODIPine (NORVASC) 5 mg tablet   Yes No   Sig: TAKE 1 TABLET BY MOUTH EVERY DAY   atenolol (TENORMIN) 50 mg tablet   Yes No   Sig: Take 50 mg by mouth daily. pravastatin (PRAVACHOL) 40 mg tablet   Yes No   Sig: Take 40 mg by mouth nightly. traMADol (ULTRAM) 50 mg tablet   No No   Sig: Take 1-2 Tabs by mouth every six (6) hours as needed. Max Daily Amount: 400 mg.       Facility-Administered Medications: None       Hospital Medications:  Current Facility-Administered Medications   Medication Dose Route Frequency    sodium chloride (NS) flush 5-10 mL  5-10 mL IntraVENous Q8H    sodium chloride (NS) flush 5-10 mL  5-10 mL IntraVENous PRN    ondansetron (ZOFRAN) injection 4 mg  4 mg IntraVENous Q6H PRN    piperacillin-tazobactam (ZOSYN) 3.375 g in 0.9% sodium chloride (MBP/ADV) 100 mL  3.375 g IntraVENous Q8H    atenolol (TENORMIN) tablet 50 mg  50 mg Oral DAILY    amLODIPine (NORVASC) tablet 5 mg  5 mg Oral DAILY    0.9% sodium chloride with KCl 40 mEq/L infusion   IntraVENous CONTINUOUS    potassium chloride 10 mEq in 100 ml IVPB 10 mEq IntraVENous Q1H    metoclopramide HCl (REGLAN) injection 10 mg  10 mg IntraVENous Q6H       Social History:  Social History   Substance Use Topics    Smoking status: Current Some Day Smoker    Smokeless tobacco: Never Used      Comment: cigar DAILY    Alcohol use 8.4 oz/week     14 Shots of liquor per week       Family History:  Family History   Problem Relation Age of Onset    Other Mother      PAD    Coronary Artery Disease Father      CABG    Alzheimer Father     Breast Cancer Sister     Other Sister      AAA    Anesth Problems Neg Hx        Review of Systems:    Constitutional: negative fever, negative chills, negative weight loss  Eyes:   negative visual changes  ENT:   negative sore throat, tongue or lip swelling  Respiratory:  negative cough, negative dyspnea  Cards:  negative for chest pain, palpitations, lower extremity edema  GI:   See HPI  :  negative for frequency, dysuria  Integument:  negative for rash and pruritus  Heme:  negative for easy bruising and gum/nose bleeding  Musculoskel: negative for myalgias, back pain and muscle weakness  Neuro: negative for headaches, dizziness, vertigo  Psych:  negative for feelings of anxiety, depression      Objective:   Patient Vitals for the past 8 hrs:   BP Temp Pulse Resp SpO2 Weight   08/30/17 0831 135/82 98.6 °F (37 °C) 75 16 95 % -   08/30/17 0715 - - - - - 69.9 kg (154 lb 1.6 oz)   08/30/17 0443 136/71 98 °F (36.7 °C) 69 15 95 % -     08/30 0701 - 08/30 1900  In: -   Out: 250 [Urine:250]  08/28 1901 - 08/30 0700  In: -   Out: 940 [Urine:940]      PHYSICAL EXAM:  General: WD, WN. Alert, cooperative, no acute distress.    HEENT: NC, Atraumatic. PERRLA, EOMI. Anicteric sclerae. Lungs:  CTA Bilaterally. No Wheezing/Rhonchi/Rales. Heart:  Regular rate and rhythm, No murmur, No Rubs, No Gallops  Abdomen: Soft, distended, tympanic, mild diffuse tenderness.  +Bowel sounds, no HSM  Extremities: No c/c/e  Neurologic:  Alert and oriented X 3.   No acute neurological distress. Psych:   Good insight. Not anxious nor agitated. Data Review     Recent Labs      08/30/17   0600  08/29/17   1800   WBC  14.7*  18.9*   HGB  8.5*  10.4*   HCT  25.2*  30.9*   PLT  226  408*     Recent Labs      08/30/17   0600  08/29/17   1800   NA  138  134*   K  3.2*  3.1*   CL  106  97   CO2  22  26   BUN  9  11   CREA  0.57*  0.87   GLU  97  114*   PHOS  2.6   --    CA  7.7*  8.5     Recent Labs      08/30/17   0600  08/29/17   1800   SGOT  87*  109*   AP  115  137*   TP  5.4*  6.7   ALB  2.5*  2.9*   GLOB  2.9  3.8   LPSE  723*  717*     Recent Labs      08/29/17   1800   INR  1.3*   PTP  13.7*        Imaging studies reviewed      Assessment:   1. Abdominal distention - likely related to post-op pseudo-obstruction in setting of recent spinal surgery and narcotic use. 2. Elevated liver enzymes - with history of daily ETOH use and hepatic steatosis. CT scan with probable cirrhosis and splenomegaly. 3. Anemia - in setting of recent surgery   4. Leukocytosis   5. Elevated lipase - likely related to a combination of the above as no evidence of pancreatitis on CT scan     Patient Active Problem List   Diagnosis Code    Spinal stenosis of lumbar region M48.06    Scoliosis M41.9    Acute pancreatitis K85.90    Ileus ([facility] [address] [address] K56.7    Generalized abdominal pain R10.84    Colitis, acute K52.9              Plan:   -IV fluids, pain control and anti-emetics as needed  -Give bisacodyl suppository  -Place rectal tube for decompression after suppository  -If no results, give fleets enema via rectal tube as have to fail at both prior to giving dose of Relistor per pharmacy protocol  -Continue antibiotics  -Follow-up on culture results   -Hepatology consult   -Discussed with Dr. Kamila Castillo  -Will follow along with you  -Thank you kindly for allowing us to participate in the care of this patient      I have personally reviewed the history and independently examined the patient.  I have reviewed the chart and agree with the documentation recorded by the Mid Level Provider, including the assessment, treatment plan, and disposition.     Maria Luisa Haynes MD

## 2017-08-30 NOTE — ED NOTES
TRANSFER - OUT REPORT:    Verbal report given to [facility] on [employer]  [facility] routine progression of care       Report consisted of patients Situation, Background, Assessment and   Recommendations(SBAR). Information from the following report(s) ED Summary was reviewed with the receiving nurse. Lines:   Peripheral IV 08/29/17 Left Antecubital (Active)   Site Assessment Clean, dry, & intact 8/29/2017  6:05 PM   Phlebitis Assessment 0 8/29/2017  6:05 PM   Infiltration Assessment 0 8/29/2017  6:05 PM   Dressing Status Clean, dry, & intact 8/29/2017  6:05 PM   Dressing Type Transparent 8/29/2017  6:05 PM   Hub Color/Line Status Pink;Capped;Flushed;Patent 8/29/2017  6:05 PM   Action Taken Blood drawn 8/29/2017  6:05 PM       Peripheral IV 08/29/17 Right Antecubital (Active)        Opportunity for questions and clarification was provided.       Patient transported with:   Patient Transport

## 2017-08-30 NOTE — ED PROVIDER NOTES
HPI Comments: 78 y.o. male with past medical history significant for HTN, heart failure, CAD, myocardial infarction, fatty liver, arthritis, and scoliosis who presents from home with chief complaint of fatigue. Patient had a robotic assisted L1-L5 decompression fusion on 8/23/17 (6 days ago). Since the operation, patient has been very fatigued and has been experiencing increased abdominal distention. His home health nurse amador lab work a couple days ago and advised patient to come to the ED after his lab work was significant for low hemoglobin and an elevated WBC. Patient admits to having a reduced appetite and states that the oxycodone he was prescribed gave him \"hallucinations\". He states that he has been taking Tylenol instead for his post-op LBP. Patient reports that his current pain level is 3/10. In addition, patient's wife states that his left foot has also been pale and cold to the touch. She states that patient has a family hx of vascular problems. Patient denies having any urinary symptoms, cough, fever, chills, or diaphoresis. No hx of abdominal surgeries. There are no other acute medical concerns at this time. Social hx: some day smoker, + EtOH use, no drug use  Ortho: Felisha Nieves MD  PCP: Vale Mejias MD    Note written by Faviola Jolley, as dictated by Edwin Green DO 6:52 PM    The history is provided by the patient. No  was used.         Past Medical History:   Diagnosis Date    Arthritis     CAD (coronary artery disease)     5 STENTS,  3 INITIAL 1997    Fatty liver     >40 YEARS    Heart failure ([address] 2000    ACUTE POST HIP REPLACEMENT     Hypertension     MI (myocardial infarction) ([address]     2000    Scoliosis        Past Surgical History:   Procedure Laterality Date    CARDIAC SURG PROCEDURE UNLIST      stent placement    HX CATARACT REMOVAL Bilateral     HX HEART CATHETERIZATION  1997    HX HERNIA REPAIR  1980    INGUINAL    HX PROSTATECTOMY  2007    NO CANCER, ELEVATED PSA    HX ROTATOR CUFF REPAIR Left 2010    HX TONSILLECTOMY      REVISE TOTAL HIP REPLACEMENT Left 2004    TOTAL HIP ARTHROPLASTY Left 2000         Family History:   Problem Relation Age of Onset    Other Mother      PAD    Coronary Artery Disease Father      CABG    Alzheimer Father     Breast Cancer Sister     Other Sister      AAA    Anesth Problems Neg Hx        Social History     Social History    Marital status:      Spouse name: N/A    Number of children: N/A    Years of education: N/A     Occupational History    Not on file. Social History Main Topics    Smoking status: Current Some Day Smoker    Smokeless tobacco: Never Used      Comment: cigar DAILY    Alcohol use 8.4 oz/week     14 Shots of liquor per week    Drug use: No    Sexual activity: Not on file     Other Topics Concern    Not on file     Social History Narrative         ALLERGIES: Review of patient's allergies indicates no known allergies. Review of Systems   Constitutional: Positive for appetite change and fatigue. Negative for chills, diaphoresis and fever. Respiratory: Negative for cough. Gastrointestinal: Positive for abdominal distention. Genitourinary: Negative for decreased urine volume, difficulty urinating, dysuria and frequency. All other systems reviewed and are negative. Vitals:    08/29/17 1930 08/29/17 2000 08/29/17 2001 08/29/17 2030   BP: 124/65 122/57 119/74 135/64   Pulse: 70 70 70 71   Resp: 14 19 22 22   Temp:   97.6 °F (36.4 °C)    SpO2: 98% 95% 97% 98%   Weight:       Height:                Physical Exam      Constitutional: Pt is awake and alert. Pt appears well-developed and well-nourished. NAD. HENT:   Head: Normocephalic and atraumatic. Nose: Nose normal.   Mouth/Throat: Oropharynx is clear and moist. No oropharyngeal exudate.    Eyes: Conjunctivae and extraocular motions are normal. Pupils are equal, round, and reactive to light. Right eye exhibits no discharge. Left eye exhibits no discharge. No scleral icterus. Neck: No tracheal deviation present. Supple neck. Cardiovascular: Normal rate, regular rhythm, normal heart sounds. Exam reveals no gallop and no friction rub. No murmur heard. Good pulses in right leg. Pulmonary/Chest: Effort normal and breath sounds normal.  Pt  has no wheezes. Pt  has no rales. Abdominal: Soft. Pt exhibits distension. No mass. Diffusely tender throughout. Pt  has no rebound and no guarding. Musculoskeletal:  Pt  exhibits no edema and no tenderness. Ext: Normal ROM in all four extremities; not tender to palpation; distal pulses are normal, no edema. Neurological:  Pt is alert. nonfocal neuro exam.  Skin: Skin is warm and dry. Pt  is not diaphoretic. Incision is clean, dry, and intact. Swelling to inferior portion. Some post operative bruising extending to bilateral flanks. Some warmth. Psychiatric:  Pt  has a normal mood and affect. Behavior is normal.   Note written by Faviola Guadarrama, as dictated by Cosme Cool DO 6:52 PM    [facility]  ED Course       Procedures  CONSULT NOTE:  9:54 PM Cosme Cool DO spoke with Dr. Norma Hebert for Surgery. Discussed available diagnostic tests and clinical findings. He is in agreement with care plans as outlined. Dr. Pulido will see the patient. Reviewed US images    Discussed with Dr Obdulia Edmonds - will admit    Will give abx for now    IVONNE Krishnamurthy from [facility] stopped by and saw the pt as well. Back pain is improving. Esr, crp are up. Improved pain argues against postop infection. Labs Reviewed   CBC WITH AUTOMATED DIFF - Abnormal; Notable for the following:        Result Value    WBC 18.9 (*)     RBC 3.20 (*)     HGB 10.4 (*)     HCT 30.9 (*)     PLATELET 742 (*)     NEUTROPHILS 77 (*)     LYMPHOCYTES 8 (*)     METAMYELOCYTES 1 (*)     MYELOCYTES 2 (*)     ABS. NEUTROPHILS 14.7 (*)     ABS.  MONOCYTES 2.1 (*)     All other components within normal limits   METABOLIC PANEL, COMPREHENSIVE - Abnormal; Notable for the following:     Sodium 134 (*)     Potassium 3.1 (*)     Glucose 114 (*)     Bilirubin, total 1.8 (*)     ALT (SGPT) 112 (*)     AST (SGOT) 109 (*)     Alk.  phosphatase 137 (*)     Albumin 2.9 (*)     A-G Ratio 0.8 (*)     All other components within normal limits   NUCLEATED RBC - Abnormal; Notable for the following:     NRBC 0.9 (*)     ABSOLUTE NRBC 0.17 (*)     All other components within normal limits   LIPASE - Abnormal; Notable for the following:     Lipase 717 (*)     All other components within normal limits   PROTHROMBIN TIME + INR - Abnormal; Notable for the following:     INR 1.3 (*)     Prothrombin time 13.7 (*)     All other components within normal limits   URINALYSIS W/ RFLX MICROSCOPIC - Abnormal; Notable for the following:     Appearance CLOUDY (*)     Urobilinogen 2.0 (*)     Leukocyte Esterase TRACE (*)     All other components within normal limits   BILIRUBIN, FRACTIONATED - Abnormal; Notable for the following:     Bilirubin, total 1.8 (*)     Bilirubin, direct 0.8 (*)     All other components within normal limits   SED RATE (ESR) - Abnormal; Notable for the following:     Sed rate, automated 59 (*)     All other components within normal limits   C REACTIVE PROTEIN, QT - Abnormal; Notable for the following:     C-Reactive protein 7.32 (*)     All other components within normal limits   BILIRUBIN, CONFIRM - Abnormal; Notable for the following:     Bilirubin UA, confirm POSITIVE (*)     All other components within normal limits   CULTURE, BLOOD, PAIRED   SAMPLES BEING HELD   LACTIC ACID   SAMPLE TO BLOOD BANK

## 2017-08-30 NOTE — PROGRESS NOTES
Problem: Falls - Risk of  Goal: *Absence of Falls  Document Brianda Fall Risk and appropriate interventions in the flowsheet.    Outcome: Progressing Towards Goal  Fall Risk Interventions:  Mobility Interventions: Patient to call before getting OOB           Medication Interventions: Patient to call before getting OOB

## 2017-08-30 NOTE — PROGRESS NOTES
Problem: Falls - Risk of  Goal: *Absence of Falls  Document Brianda Fall Risk and appropriate interventions in the flowsheet.   Outcome: Progressing Towards Goal  Fall Risk Interventions:  Mobility Interventions: Utilize walker, cane, or other assitive device

## 2017-08-30 NOTE — ED NOTES
Beside report received from [facility] [employer]. Report included SBAR, MAR and recent results.

## 2017-08-30 NOTE — PROGRESS NOTES
Care Management Interventions  PCP Verified by CM: Yes  Mode of Transport at Discharge: Other (see comment) (private vehicle)  Discharge Durable Medical Equipment: No (has a rolling walker)  Physical Therapy Consult: No  Occupational Therapy Consult: No  Speech Therapy Consult: No  Current Support Network: Lives with Spouse (independent with ADLs)  Confirm Follow Up Transport: Family  Plan discussed with Pt/Family/Caregiver: Yes  Freedom of Choice Offered: Yes    CM reviewed chart and met with pt's wife and daughter to introduce self and discuss discharge planning. Pt was independent with his ADLs and IADLs prior to admission. Pt lives with his wife in a Condo here in [place] that has 4 steps to enter. Pt also has a home in [place], where they spend 6 months out of the year. Pt has a rolling walker at home to assist with ambulation, and was open to [facility] for health services. Pt's PCP is Dr. Savage Menendez, and he gets his prescriptions filled at his local [facility] pharmacy. CM will continue to follow for discharge needs.   Tatiana Little, TORSTENW, ACM

## 2017-08-30 NOTE — ED NOTES
Bedside and Verbal shift change report given to Estrella Hurtado RN (oncoming nurse) by Paul Jacome RN (offgoing nurse). Report included the following information SBAR, Kardex, ED Summary, Procedure Summary, MAR, Recent Results and Cardiac Rhythm NSR.

## 2017-08-30 NOTE — ED NOTES
Bedside and Verbal shift change report given to [id number] Nash RN (oncoming nurse) by Dada Booth RN (offgoing nurse). Report included the following information SBAR, ED Summary, MAR and Recent Results.

## 2017-08-30 NOTE — PROGRESS NOTES
Hospitalist Progress Note  Zelalem Licea NP  Office: [phone]  Cell: [phone] 7a-5p      Date of Service:  2017  NAME:  Teena Miller  :  1940  MRN:  [id number]      Admission Summary:   78 yom with pmh of arthritis, coronary artery disease s/p PTCA stent, fatty liver, CHF, degenerative joint disease, HTN, MI, scoliosis, spinal stenosis, presented to the ED from home accompanied by his wife and   daughter with cc of feeling tired, fatigued, lethargic. Pt is a limited historian in this regard. His wife notes that she felt like the pt was more confused, lethargic, looking more fatigue which has been ongoing since he had his surgery. Pt reportedly had L2-L3, L3-L4, L4-L5 laminectomy, decompression and posterior lateral fusion of L1 to L5 on 2017. According to the wife, home health nurse visited the patient yesterday, collected some lab work, told that the pt was anemic with a decreased hgb and elevated WBC. She notes they were   advised to go to the hospital. Pt has been more fatigued, looking lethargic, confused. Pt reportedly had been prescribed oxycodone, which according to reports, made him have hallucinations. Reports pt has not had much narcotics and has been taking Tylenol for pain. Interval history / Subjective:    Patient sitting on side of bed about to walk with PT. Reports he feels a little better today but is still fatigued. Patient denies any nausea, abdominal pain, or change in BMs. However, daughter at bedside and notes patient has been having loose stools since 2 days post op.       Assessment & Plan:     Possible Pseudo-Obstruction s/p recent surgery  -spoke with Hilda Pena GI NP, CT imaging review with Dr Carmen Coon who believes colon wall thickening likely from pseudo-obstruction not colitis   -place rectal tube  -start Relistor    Diarrhea   -likely from obstruction  -plan as above  -if persists tomorrow will consider sending stools to r/o infectious process    Elevated Lipase  -unlikely pancreatitis  -lipase only mildly elevated and asymptomatic  -CT abd showing normal pancreatitis     Liver Cirrhosis  -seen on CT with elevated liver enzymes   -consult hepatology    Leukocytosis   -unclear etiology, possibly reactive from pseudo-obstruction vs possible infectious process   -continue IV Zosyn for now   -follow blood cultures     Anemia  -possibly related to previous surgery, denies any blood in urine or stool   -check occult stool   -send iron studies     Hypokalemia   -replenished, added K to IVF   -recheck in am     H/o HTN   -stable, monitor    Code status: Full  DVT prophylaxis: SCDs    Care Plan discussed with: Patient/Family and Nurse Edie Lock NP, Dr Butch Mckeon   Disposition: Home w/Family, HH PT, OT, RN and TBD     Hospital Problems  Date Reviewed: 8/30/2017          Codes Class Noted POA    * (Principal)Acute pancreatitis ICD-10-CM: K85.90  ICD-9-CM: 577.0  8/29/2017 Unknown        Ileus ([facility] [address] ICD-10-CM: K56.7  ICD-9-CM: 560.1  8/29/2017 Unknown        Generalized abdominal pain ICD-10-CM: R10.84  ICD-9-CM: 789.07  8/29/2017 Unknown        Colitis, acute ICD-10-CM: K52.9  ICD-9-CM: 558.9  8/29/2017 Unknown                Review of Systems:   A comprehensive review of systems was negative except for that written in the HPI. Vital Signs:    Last 24hrs VS reviewed since prior progress note.  Most recent are:  Visit Vitals    /77 (BP 1 Location: Right arm, BP Patient Position: At rest)    Pulse 68    Temp 97.5 °F (36.4 °C)    Resp 16    Ht 5' 6\" (1.676 m)    Wt 69.9 kg (154 lb 1.6 oz)    SpO2 96%    BMI 24.87 kg/m2         Intake/Output Summary (Last 24 hours) at 08/30/17 1531  Last data filed at 08/30/17 1445   Gross per 24 hour   Intake              240 ml   Output             1190 ml   Net             -950 ml        Physical Examination:             Constitutional:  Male sitting on the side of bed in no acute distress   ENT:  Oral mucous moist, oropharynx benign. Neck supple,    Resp:  CTA bilaterally. No wheezing/rhonchi/rales. No accessory muscle use   CV:  Regular rhythm, normal rate, no murmurs, gallops, rubs    GI:  Soft, non distended, non tender. normoactive bowel sounds, no hepatosplenomegaly     Musculoskeletal:  No edema, warm, 2+ pulses throughout    Neurologic:  Moves all extremities. AAOx3     Psych:  Good insight, Not anxious nor agitated. Data Review:    Review and/or order of clinical lab test  Review and/or order of tests in the radiology section of CPT  Review and/or order of tests in the medicine section of CPT      Labs:     Recent Labs      08/30/17   0600 08/29/17   1800   WBC  14.7*  18.9*   HGB  8.5*  10.4*   HCT  25.2*  30.9*   PLT  226  408*     Recent Labs      08/30/17   0600 08/29/17   1800   NA  138  134*   K  3.2*  3.1*   CL  106  97   CO2  22  26   BUN  9  11   CREA  0.57*  0.87   GLU  97  114*   CA  7.7*  8.5   MG  2.0   --    PHOS  2.6   --      Recent Labs      08/30/17   0600 08/29/17   1800   SGOT  87*  109*   ALT  87*  112*   AP  115  137*   TBILI  1.6*  1.8*  1.8*   TP  5.4*  6.7   ALB  2.5*  2.9*   GLOB  2.9  3.8   LPSE  723*  717*     Recent Labs      08/29/17   1800   INR  1.3*   PTP  13.7*      No results for input(s): FE, TIBC, PSAT, FERR in the last 72 hours. No results found for: FOL, RBCF   No results for input(s): PH, PCO2, PO2 in the last 72 hours. No results for input(s): CPK, CKNDX, TROIQ in the last 72 hours.     No lab exists for component: CPKMB  Lab Results   Component Value Date/Time    Cholesterol, total 105 08/30/2017 06:00 AM    HDL Cholesterol 19 08/30/2017 06:00 AM    LDL, calculated 56.4 08/30/2017 06:00 AM    Triglyceride 148 08/30/2017 06:00 AM    CHOL/HDL Ratio 5.5 08/30/2017 06:00 AM     Lab Results   Component Value Date/Time    Glucose (POC) 118 08/23/2017 10:35 AM     Lab Results   Component Value Date/Time    Color DARK YELLOW 08/29/2017 07:49 PM    Appearance CLOUDY 08/29/2017 07:49 PM    Specific gravity 1.022 08/29/2017 07:49 PM    pH (UA) 6.0 08/29/2017 07:49 PM    Protein NEGATIVE  08/29/2017 07:49 PM    Glucose NEGATIVE  08/29/2017 07:49 PM    Ketone NEGATIVE  08/29/2017 07:49 PM    Bilirubin NEGATIVE  08/16/2017 10:30 AM    Urobilinogen 2.0 08/29/2017 07:49 PM    Nitrites NEGATIVE  08/29/2017 07:49 PM    Leukocyte Esterase TRACE 08/29/2017 07:49 PM    Epithelial cells FEW 08/29/2017 07:49 PM    Bacteria NEGATIVE  08/29/2017 07:49 PM    WBC 0-4 08/29/2017 07:49 PM    RBC 0-5 08/29/2017 07:49 PM         Medications Reviewed:     Current Facility-Administered Medications   Medication Dose Route Frequency    sodium chloride (NS) flush 5-10 mL  5-10 mL IntraVENous Q8H    sodium chloride (NS) flush 5-10 mL  5-10 mL IntraVENous PRN    ondansetron (ZOFRAN) injection 4 mg  4 mg IntraVENous Q6H PRN    piperacillin-tazobactam (ZOSYN) 3.375 g in 0.9% sodium chloride (MBP/ADV) 100 mL  3.375 g IntraVENous Q8H    atenolol (TENORMIN) tablet 50 mg  50 mg Oral DAILY    amLODIPine (NORVASC) tablet 5 mg  5 mg Oral DAILY    0.9% sodium chloride with KCl 40 mEq/L infusion   IntraVENous CONTINUOUS    metoclopramide HCl (REGLAN) injection 10 mg  10 mg IntraVENous Q6H     ______________________________________________________________________  EXPECTED LENGTH OF STAY: 3d 19h  ACTUAL LENGTH OF STAY:          [facility], NP

## 2017-08-31 ENCOUNTER — APPOINTMENT (OUTPATIENT)
Dept: GENERAL RADIOLOGY | Age: 77
DRG: 389 | End: 2017-08-31
Attending: NURSE PRACTITIONER
Payer: MEDICARE

## 2017-08-31 LAB
ALBUMIN SERPL-MCNC: 2.5 G/DL (ref 3.5–5)
ALBUMIN/GLOB SERPL: 1.1 {RATIO} (ref 1.1–2.2)
ALP SERPL-CCNC: 123 U/L (ref 45–117)
ALT SERPL-CCNC: 90 U/L (ref 12–78)
ANION GAP SERPL CALC-SCNC: 10 MMOL/L (ref 5–15)
APPEARANCE FLD: ABNORMAL
AST SERPL-CCNC: 90 U/L (ref 15–37)
BASOPHILS # BLD: 0 K/UL (ref 0–0.1)
BASOPHILS NFR BLD: 0 % (ref 0–1)
BILIRUB DIRECT SERPL-MCNC: 1 MG/DL (ref 0–0.2)
BILIRUB SERPL-MCNC: 1.7 MG/DL (ref 0.2–1)
BUN SERPL-MCNC: 8 MG/DL (ref 6–20)
BUN/CREAT SERPL: 15 (ref 12–20)
C DIFF TOX GENS STL QL NAA+PROBE: NEGATIVE
CALCIUM SERPL-MCNC: 7.6 MG/DL (ref 8.5–10.1)
CHLORIDE SERPL-SCNC: 107 MMOL/L (ref 97–108)
CO2 SERPL-SCNC: 20 MMOL/L (ref 21–32)
COLOR FLD: ABNORMAL
CREAT SERPL-MCNC: 0.52 MG/DL (ref 0.7–1.3)
DIFFERENTIAL METHOD BLD: ABNORMAL
EOSINOPHIL # BLD: 0.3 K/UL (ref 0–0.4)
EOSINOPHIL NFR BLD: 2 % (ref 0–7)
ERYTHROCYTE [DISTWIDTH] IN BLOOD BY AUTOMATED COUNT: 14.2 % (ref 11.5–14.5)
GLOBULIN SER CALC-MCNC: 2.3 G/DL (ref 2–4)
GLUCOSE SERPL-MCNC: 83 MG/DL (ref 65–100)
HCT VFR BLD AUTO: 26 % (ref 36.6–50.3)
HEMOCCULT STL QL: NEGATIVE
HGB BLD-MCNC: 8.7 G/DL (ref 12.1–17)
IRON SATN MFR SERPL: 14 % (ref 20–50)
IRON SERPL-MCNC: 34 UG/DL (ref 35–150)
LIPASE SERPL-CCNC: 768 U/L (ref 73–393)
LYMPHOCYTES # BLD: 1.5 K/UL (ref 0.8–3.5)
LYMPHOCYTES NFR BLD: 10 % (ref 12–49)
LYMPHOCYTES NFR FLD: 1 %
MCH RBC QN AUTO: 32.7 PG (ref 26–34)
MCHC RBC AUTO-ENTMCNC: 33.5 G/DL (ref 30–36.5)
MCV RBC AUTO: 97.7 FL (ref 80–99)
METAMYELOCYTES NFR BLD MANUAL: 1 %
MONOCYTES # BLD: 1.1 K/UL (ref 0–1)
MONOCYTES NFR BLD: 7 % (ref 5–13)
MONOS+MACROS NFR FLD: 3 %
NEUTROPHILS NFR FLD: 96 %
NEUTS BAND NFR BLD MANUAL: 1 % (ref 0–6)
NEUTS SEG # BLD: 12 K/UL (ref 1.8–8)
NEUTS SEG NFR BLD: 79 % (ref 32–75)
NUC CELL # FLD: 7032 /CU MM (ref 0–5)
PLATELET # BLD AUTO: 230 K/UL (ref 150–400)
POTASSIUM SERPL-SCNC: 3.6 MMOL/L (ref 3.5–5.1)
PROT SERPL-MCNC: 4.8 G/DL (ref 6.4–8.2)
RBC # BLD AUTO: 2.66 M/UL (ref 4.1–5.7)
RBC # FLD: >100 /CU MM
RBC MORPH BLD: ABNORMAL
RBC MORPH BLD: ABNORMAL
SODIUM SERPL-SCNC: 137 MMOL/L (ref 136–145)
SPECIMEN SOURCE FLD: ABNORMAL
TIBC SERPL-MCNC: 237 UG/DL (ref 250–450)
WBC # BLD AUTO: 15 K/UL (ref 4.1–11.1)

## 2017-08-31 PROCEDURE — 74011250637 HC RX REV CODE- 250/637: Performed by: HOSPITALIST

## 2017-08-31 PROCEDURE — 89050 BODY FLUID CELL COUNT: CPT | Performed by: ORTHOPAEDIC SURGERY

## 2017-08-31 PROCEDURE — 83540 ASSAY OF IRON: CPT | Performed by: INTERNAL MEDICINE

## 2017-08-31 PROCEDURE — 97116 GAIT TRAINING THERAPY: CPT

## 2017-08-31 PROCEDURE — 65270000032 HC RM SEMIPRIVATE

## 2017-08-31 PROCEDURE — 82272 OCCULT BLD FECES 1-3 TESTS: CPT | Performed by: NURSE PRACTITIONER

## 2017-08-31 PROCEDURE — 74011000258 HC RX REV CODE- 258: Performed by: FAMILY MEDICINE

## 2017-08-31 PROCEDURE — 74011250637 HC RX REV CODE- 250/637: Performed by: NURSE PRACTITIONER

## 2017-08-31 PROCEDURE — 77030020186 HC BOOT HL PROTCT SAGE -B

## 2017-08-31 PROCEDURE — 97530 THERAPEUTIC ACTIVITIES: CPT

## 2017-08-31 PROCEDURE — 74011250636 HC RX REV CODE- 250/636: Performed by: FAMILY MEDICINE

## 2017-08-31 PROCEDURE — 80076 HEPATIC FUNCTION PANEL: CPT | Performed by: NURSE PRACTITIONER

## 2017-08-31 PROCEDURE — 85025 COMPLETE CBC W/AUTO DIFF WBC: CPT | Performed by: NURSE PRACTITIONER

## 2017-08-31 PROCEDURE — 74011250636 HC RX REV CODE- 250/636: Performed by: NURSE PRACTITIONER

## 2017-08-31 PROCEDURE — 74011250636 HC RX REV CODE- 250/636: Performed by: SURGERY

## 2017-08-31 PROCEDURE — 97161 PT EVAL LOW COMPLEX 20 MIN: CPT

## 2017-08-31 PROCEDURE — 80048 BASIC METABOLIC PNL TOTAL CA: CPT | Performed by: NURSE PRACTITIONER

## 2017-08-31 PROCEDURE — 87205 SMEAR GRAM STAIN: CPT | Performed by: INTERNAL MEDICINE

## 2017-08-31 PROCEDURE — 74011250637 HC RX REV CODE- 250/637: Performed by: INTERNAL MEDICINE

## 2017-08-31 PROCEDURE — 87493 C DIFF AMPLIFIED PROBE: CPT | Performed by: NURSE PRACTITIONER

## 2017-08-31 PROCEDURE — 83690 ASSAY OF LIPASE: CPT | Performed by: NURSE PRACTITIONER

## 2017-08-31 PROCEDURE — 36415 COLL VENOUS BLD VENIPUNCTURE: CPT | Performed by: NURSE PRACTITIONER

## 2017-08-31 PROCEDURE — 74000 XR ABD (KUB): CPT

## 2017-08-31 RX ORDER — DEXTROMETHORPHAN POLISTIREX 30 MG/5 ML
135 SUSPENSION, EXTENDED RELEASE 12 HR ORAL
Status: COMPLETED | OUTPATIENT
Start: 2017-08-31 | End: 2017-08-31

## 2017-08-31 RX ADMIN — METOCLOPRAMIDE 10 MG: 5 INJECTION, SOLUTION INTRAMUSCULAR; INTRAVENOUS at 13:58

## 2017-08-31 RX ADMIN — PIPERACILLIN SODIUM,TAZOBACTAM SODIUM 3.38 G: 3; .375 INJECTION, POWDER, FOR SOLUTION INTRAVENOUS at 15:06

## 2017-08-31 RX ADMIN — METOCLOPRAMIDE 10 MG: 5 INJECTION, SOLUTION INTRAMUSCULAR; INTRAVENOUS at 18:21

## 2017-08-31 RX ADMIN — SODIUM CHLORIDE AND POTASSIUM CHLORIDE: 9; 2.98 INJECTION, SOLUTION INTRAVENOUS at 15:10

## 2017-08-31 RX ADMIN — METOCLOPRAMIDE 10 MG: 5 INJECTION, SOLUTION INTRAMUSCULAR; INTRAVENOUS at 06:04

## 2017-08-31 RX ADMIN — PIPERACILLIN SODIUM,TAZOBACTAM SODIUM 3.38 G: 3; .375 INJECTION, POWDER, FOR SOLUTION INTRAVENOUS at 22:46

## 2017-08-31 RX ADMIN — SODIUM CHLORIDE AND POTASSIUM CHLORIDE: 9; 2.98 INJECTION, SOLUTION INTRAVENOUS at 02:28

## 2017-08-31 RX ADMIN — MINERAL OIL 135 ML: 100 OIL RECTAL at 11:54

## 2017-08-31 RX ADMIN — IRON SUCROSE 300 MG: 20 INJECTION, SOLUTION INTRAVENOUS at 16:35

## 2017-08-31 RX ADMIN — ATENOLOL 50 MG: 50 TABLET ORAL at 08:42

## 2017-08-31 RX ADMIN — PIPERACILLIN SODIUM,TAZOBACTAM SODIUM 3.38 G: 3; .375 INJECTION, POWDER, FOR SOLUTION INTRAVENOUS at 06:04

## 2017-08-31 RX ADMIN — Medication 10 ML: at 13:59

## 2017-08-31 RX ADMIN — AMLODIPINE BESYLATE 5 MG: 5 TABLET ORAL at 08:42

## 2017-08-31 RX ADMIN — CASTOR OIL AND BALSAM, PERU: 788; 87 OINTMENT TOPICAL at 18:21

## 2017-08-31 RX ADMIN — Medication 10 ML: at 22:46

## 2017-08-31 NOTE — PROGRESS NOTES
Daily Progress Note  [facility] General Surgery at [address] Date: 2017  Post-Operative Day: 8 from:   1. Laminectomy and decompression with medial facetectomies and   foraminotomies at L4-L5.   2. Laminectomy and decompression with medial facetectomies and   foraminotomies at L3-L4. 3. Decompression and laminectomy with medial facetectomies and   foraminotomies at L2-L3. 4. Posterolateral fusion, L1 to L5.   5. Placement of segmental instrumentation using [facility]   pedicle screws from L1 to L5, 5.5 and 6.5 mm in thickness. 6. Transforaminal lumbar interbody fusion, L4-L5. 7. Placement of interbody machine cage, 8 mm [place] cage. 8. Iliac crest bone graft, left hip. 9. Bone marrow aspirate, right hip. 10. Use of local autograft bone. 11. Use of [employer] robotics to place pedicle screws.       Subjective:     Last 24 hrs: Denies abdominal pain. Having a lot of liquid stool after getting enema yesterday. Has rectal tube in place. Per his wife there was a lot of stool that drained around the tube last night. There is plan for a repeat enema today. He is having a lot of back pain and staying curled up on his side. There is serosanguinous drainage coming from a small opening in his back incision. WBC up to 15 today, on zosyn. HIDA was negative for acute cholecystitis and had normal EF. C diff is pending. Objective:     Blood pressure 136/75, pulse 82, temperature 98.3 °F (36.8 °C), resp. rate 15, height 5' 6\" (1.676 m), weight 69.9 kg (154 lb 1.6 oz), SpO2 98 %. Temp (24hrs), Av.1 °F (36.7 °C), Min:97.5 °F (36.4 °C), Max:98.8 °F (37.1 °C)      _____________________  Physical Exam:     Alert and Oriented, lying in bed, appears uncomfortable. No acute distress. Cardiovascular: RRR  Lungs:CTAB   Abdomen: + BS, soft, distended, NT. Rectal tube in place with brown liquid stool in collection device.    Back:  Incision with localized swelling and tenderness and 0.5 cm opening in lower portion with ss drainage.      Assessment:   Principal Problem:    Pseudo-obstruction of colon (8/30/2017)    Active Problems:    Ileus ([address] (8/29/2017)      Generalized abdominal pain (8/29/2017)      Leukocytosis (8/30/2017)      Anemia (8/30/2017)      Hypokalemia (8/30/2017)      Elevated liver enzymes (8/30/2017)            Plan:     D/W Dr. Corinne Pleasure c-diff due to leukocytosis  Further management of pseudo obstruction per GI  Will notify Dr. Cindy Stovall of drainage coming from wound        Katheryn Hernadez, [address] [facility] at [place] [address],  [place] [address], [address] [place] [address]  [phone]    Data Review:    Recent Labs      08/31/17 0447 08/30/17 0600 08/29/17   1800   WBC  15.0*  14.7*  18.9*   HGB  8.7*  8.5*  10.4*   HCT  26.0*  25.2*  30.9*   PLT  230  226  408*     Recent Labs      08/31/17 0447 08/30/17 0600 08/29/17   1800   NA  137  138  134*   K  3.6  3.2*  3.1*   CL  107  106  97   CO2  20*  22  26   GLU  83  97  114*   BUN  8  9  11   CREA  0.52*  0.57*  0.87   CA  7.6*  7.7*  8.5   MG   --   2.0   --    PHOS   --   2.6   --    ALB  2.5*  2.5*  2.9*   TBILI  1.7*  1.6*  1.8*  1.8*   SGOT  90*  87*  109*   ALT  90*  87*  112*   INR   --    --   1.3*     Recent Labs      08/31/17 0447 08/30/17   0600 08/29/17   1800   LPSE  768*  723*  717*           ______________________  Medications:    Current Facility-Administered Medications   Medication Dose Route Frequency    mineral oil (FLEET) enema 135 mL  135 mL Rectal NOW    sodium chloride (NS) flush 5-10 mL  5-10 mL IntraVENous Q8H    sodium chloride (NS) flush 5-10 mL  5-10 mL IntraVENous PRN    ondansetron (ZOFRAN) injection 4 mg  4 mg IntraVENous Q6H PRN    piperacillin-tazobactam (ZOSYN) 3.375 g in 0.9% sodium chloride (MBP/ADV) 100 mL  3.375 g IntraVENous Q8H    atenolol (TENORMIN) tablet 50 mg  50 mg Oral DAILY    amLODIPine (NORVASC) tablet 5 mg  5 mg Oral DAILY    0.9% sodium chloride with KCl 40 mEq/L infusion   IntraVENous CONTINUOUS    metoclopramide HCl (REGLAN) injection 10 mg  10 mg IntraVENous Q6H         ADDENDUM:  Breezy Talbert MD  Pt seen and examined. No acute surgical issues. C diff obtained for diarrhea. Leukocytosis persists. Back fluid collection was aspirated by Ortho - No growth yet. No acute indication for cholecystectomy. Continue antibiotic for now. Diet as tolerated.

## 2017-08-31 NOTE — PROGRESS NOTES
[address] [facility]  217 [facility] [address]   [phone]                GI PROGRESS NOTE  Landon Sahni, AGACNP-BC  Work Cell: [phone]      NAME:   Alan Hampton   :    1940   MRN:    [id number]     Assessment/Plan   1. Abdominal distention - likely related to post-op pseudo-obstruction in setting of recent spinal surgery and narcotic use. Rectal tube placed, enema given and distension improved. - Continue rectal tube for now  - Enema x 1 via rectal tube today  - Monitor stool output  2. Elevated liver enzymes - with history of daily ETOH use and hepatic steatosis. CT scan with probable cirrhosis and splenomegaly.   - Hepatology consult pending  3. Anemia - in setting of recent surgery. Hgb 8.7 this AM. He is without any evidence of overt bleeding.   - Occult stool pending  - Monitor H&H and follow clinical course for any signs of bleeding  4. Leukocytosis - WBC trending up  - Cultures with NGTD   - Agree with checking C. Diff  - Continue antibiotics   5. Elevated lipase - likely related to a combination of the above as no evidence of pancreatitis on CT scan      Patient Active Problem List   Diagnosis Code    Spinal stenosis of lumbar region M48.06    Scoliosis M41.9    Ileus ([address] K56.7    Generalized abdominal pain R10.84    Leukocytosis D72.829    Anemia D64.9    Hypokalemia E87.6    Pseudo-obstruction of colon K59.8    Elevated liver enzymes R74.8       Subjective:     Feeling better. Reports improvement in abdominal distension. Denies any abdominal pain. Tolerating small amount of clears without nausea or vomiting. Rectal tube placed yesterday and enema given via tube. Has over 400 ml of brown liquid stool in flexiseal bag.        Review of Systems    Constitutional: negative fever, negative chills, negative weight loss  Eyes:   negative visual changes  ENT:   negative sore throat, tongue or lip swelling  Respiratory:  negative cough, negative dyspnea  Cards:  negative for chest pain, palpitations, lower extremity edema  GI:   See HPI  :  negative for frequency, dysuria  Integument:  negative for rash and pruritus  Heme:  negative for easy bruising and gum/nose bleeding  Musculoskel: negative for myalgias, back pain and muscle weakness  Neuro: negative for headaches, dizziness, vertigo  Psych:  negative for feelings of anxiety, depression     Objective:     VITALS:   Last 24hrs VS reviewed since prior hospitalist progress note. Most recent are:  Visit Vitals    /75 (BP 1 Location: Right arm, BP Patient Position: At rest)    Pulse 82    Temp 98.3 °F (36.8 °C)    Resp 15    Ht 5' 6\" (1.676 m)    Wt 69.9 kg (154 lb 1.6 oz)    SpO2 98%    BMI 24.87 kg/m2       Intake/Output Summary (Last 24 hours) at 08/31/17 1011  Last data filed at 08/30/17 1445   Gross per 24 hour   Intake              240 ml   Output                0 ml   Net              240 ml        PHYSICAL EXAM:  General   well developed, alert, sitting in chair, in no acute distress  EENT  Normocephalic, Atraumatic, PERRLA, EOMI, sclera clear  Respiratory   Clear To Auscultation bilaterally - no wheezes, rales, rhonchi, or crackles  Cardiology  Regular Rate and Rythmn  - no murmurs, rubs or gallops  Abdominal  Soft, mildly distended, non-tender, positive bowel sounds, no hepatosplenomegaly, no palpable mass  Extremities  No clubbing, cyanosis, or edema. Pulses intact. Neurological  No focal neurological deficits noted  Psychological  Oriented x 3. Normal affect.        Lab Data   Recent Results (from the past 12 hour(s))   METABOLIC PANEL, BASIC    Collection Time: 08/31/17  4:47 AM   Result Value Ref Range    Sodium 137 136 - 145 mmol/L    Potassium 3.6 3.5 - 5.1 mmol/L    Chloride 107 97 - 108 mmol/L    CO2 20 (L) 21 - 32 mmol/L    Anion gap 10 5 - 15 mmol/L    Glucose 83 65 - 100 mg/dL    BUN 8 6 - 20 MG/DL    Creatinine 0.52 (L) 0.70 - 1.30 MG/DL    BUN/Creatinine ratio 15 12 - 20      GFR est AA >60 >60 ml/min/1.73m2    GFR est non-AA >60 >60 ml/min/1.73m2    Calcium 7.6 (L) 8.5 - 10.1 MG/DL   CBC WITH AUTOMATED DIFF    Collection Time: 08/31/17  4:47 AM   Result Value Ref Range    WBC 15.0 (H) 4.1 - 11.1 K/uL    RBC 2.66 (L) 4.10 - 5.70 M/uL    HGB 8.7 (L) 12.1 - 17.0 g/dL    HCT 26.0 (L) 36.6 - 50.3 %    MCV 97.7 80.0 - 99.0 FL    MCH 32.7 26.0 - 34.0 PG    MCHC 33.5 30.0 - 36.5 g/dL    RDW 14.2 11.5 - 14.5 %    PLATELET 863 363 - 595 K/uL    NEUTROPHILS 79 (H) 32 - 75 %    BAND NEUTROPHILS 1 0 - 6 %    LYMPHOCYTES 10 (L) 12 - 49 %    MONOCYTES 7 5 - 13 %    EOSINOPHILS 2 0 - 7 %    BASOPHILS 0 0 - 1 %    METAMYELOCYTES 1 (H) 0 %    ABS. NEUTROPHILS 12.0 (H) 1.8 - 8.0 K/UL    ABS. LYMPHOCYTES 1.5 0.8 - 3.5 K/UL    ABS. MONOCYTES 1.1 (H) 0.0 - 1.0 K/UL    ABS. EOSINOPHILS 0.3 0.0 - 0.4 K/UL    ABS. BASOPHILS 0.0 0.0 - 0.1 K/UL    DF MANUAL      RBC COMMENTS ANISOCYTOSIS  1+        RBC COMMENTS POLYCHROMASIA  PRESENT       IRON PROFILE    Collection Time: 08/31/17  4:47 AM   Result Value Ref Range    Iron 34 (L) 35 - 150 ug/dL    TIBC 237 (L) 250 - 450 ug/dL    Iron % saturation 14 (L) 20 - 50 %         Medications: Reviewed    PMH/SH reviewed - no change compared to H&P  Mid-Level Provider: Omar Stone NP   Date/Time:  8/31/2017        I have personally reviewed the history and independently examined the patient. I have reviewed the chart and agree with the documentation recorded by the Mid Level Provider, including the assessment, treatment plan, and disposition.       Debbie Chavez MD

## 2017-08-31 NOTE — PROGRESS NOTES
Bedside shift change report given to kailey  (oncoming nurse) by Alessandra Sanford (offgoing nurse). Report included the following information SBAR, Kardex and MAR.

## 2017-08-31 NOTE — PROGRESS NOTES
Bedside shift change report given to Janette Foote RN (oncoming nurse) by Michael Hopson RN (offgoing nurse). Report included the following information SBAR and Kardex.

## 2017-08-31 NOTE — PROGRESS NOTES
Bedside shift change report given to Yousif Oconnell (oncoming nurse) by Adams Mckee (offgoing nurse). Report included the following information SBAR, Kardex and MAR.

## 2017-08-31 NOTE — PROGRESS NOTES
Orthopedic Spine Progress Note  Devang Mendosa, AGACNP-BC  Work Cell: [phone]        August 31, 2017 12:39 PM     [place]    HPI    [place] is a 78 y.o. male with history of scoliosis and chronic back pain with neurogenic claudication. He underwent a L1-5 Lumbar fusion on 8/23/2017 for multilevel spondylosis with varying degrees of spinal stenosis and foraminal stenosis. Lyndsey Calvillo His surgical drain was removed on 8/25/2017. He was discharged home with his wife in stable condition on postoperative day 4. Patient's wife reports that Mr. Pippa Gallardo became lethargic at home. He developed some abdominal pain, nausea, and abdominal distension. He did not have any fevers or chills. He denied headaches. He was having loose watery and stools without evidence of blood in stools. His home health nurse found him to be anemic with an elevated WBC and he was advised to go to the ED. On admission he was found to have elevated lipase, elevated liver enzymes, WBC count of 18.9, and hemoglobin of 10.4. He was admitted for workup for possible acute colitis, ileus, acute pancreatitis, and hepatic cirrhosis. The orthopedic spine service was consulted for postoperative evaluation.      Subjective:   Patient noted to have increased serosanguinous drainage from his lumbar incision this afternoon. No wound dehiscence. No significant surrounding erythema. He had a palpable underlying fluid collection bilaterally which was aspirated at bedside (approximately 200 cc). Sample was sent for cultures and cell count. Mr. Pippa Gallardo denies abd pain. Rectal tube in place with drainage of brown liquid stool around the tube noted. HIDA scan negative for acute choleycystitis. Patient still with generalized malaise. He denies headache, vision changes, dyspnea, lumbago, leg pain, or paresthesias in his extremities.      **lumbar dressing changed and reinforced-  Maintain clean area to lower back to prevent stool from getting in or near incision** Objective:   General: alert, cooperative, no distress. EENT: EOMI. Anicteric sclerae. Oral mucous moist, oropharynx benign  Resp: CTA bilaterally. No wheezing/rhonchi/rales. No accessory muscle use  CV: Regular rhythm, normal rate, no murmurs, gallops, rubs. No cyanosis or clubbing. No edema appreciated in the extremities. Gastrointestinal:  Soft, non-tender. normoactive bowel sounds, no hepatosplenomegaly  Neurological: Follows commands. Speech clear. Affect normal.  DAI. Strength 5/5 in BUE and BLE. Sensation stable. Musculoskeletal:  Calves soft, supple, non-tender upon palpation or with passive stretch. Psych: Good insight. Not anxious nor agitated. Skin: Incision - clean, dry and intact. No significant surrounding erythema or swelling.     Dressing: clean, dry, and intact       Vital Signs:    Patient Vitals for the past 8 hrs:   BP Temp Pulse Resp SpO2   17 0755 136/75 98.3 °F (36.8 °C) 82 15 98 %     Temp (24hrs), Av.1 °F (36.7 °C), Min:97.5 °F (36.4 °C), Max:98.8 °F (37.1 °C)      Intake/Output:      1901 -  0700  In: 240 [P.O.:240]  Out: 1190 [Urine:1190]    Pain Control:   Pain Assessment  Pain Scale 1: Numeric (0 - 10)  Pain Intensity 1: 0  Pain Onset 1: week  Pain Location 1: Back, Head  Pain Orientation 1: Lower  Pain Description 1: Sore  Pain Intervention(s) 1: Position    LAB:    Recent Labs      17   0447   HCT  26.0*   HGB  8.7*     Lab Results   Component Value Date/Time    Sodium 137 2017 04:47 AM    Potassium 3.6 2017 04:47 AM    Chloride 107 2017 04:47 AM    CO2 20 2017 04:47 AM    Glucose 83 2017 04:47 AM    BUN 8 2017 04:47 AM    Creatinine 0.52 2017 04:47 AM    Calcium 7.6 2017 04:47 AM       PT/OT:   Gait:  Gait  Speed/Yuko: Slow, Pace decreased (<100 feet/min)  Step Length: Right shortened, Left shortened  Gait Abnormalities: Decreased step clearance (tentative gait with flexi-seal)  Ambulation - Level of Assistance: Contact guard assistance (line management assist; no balance deficits at RW noted)  Distance (ft): 30 Feet (ft) (x2)  Assistive Device: Gait belt, Brace/Splint, Walker, rolling  Stairs - Level of Assistance:  (NT)                 Assessment/Plan:    1. Leukocytosis   -WBC 15   -no fevers   -urinalysis negative   -blood cultures from 8/29 NGTD   -Lumbar superficial fluid collection aspirated 8/31, cultures pending   -cont empirical Zosyn per hospitalist    2. Suspected pseudo-obstruction   -GI following. Relistor if other options fail.   -Rectal tube in place   -limit opioids, ivf    3. Elevated liver enzymes   -Hx of ETOH use   -hepatology consulted for cirrhosis noted on CT    4. Elevated Lipase   -768 today   -no evidence of pancreatitis on CT   -GI following    5. Possible colitis   -c-diff pending   -GI following    6.  Anemia   -hgb 8.7   -occult stool negative   -Cont to monitor for bleeding   -management per hospitalist         Discharge To:   Pending     Signed By: Kourtney James NP

## 2017-08-31 NOTE — PROGRESS NOTES
Care Management Interventions  PCP Verified by CM: Yes  Mode of Transport at Discharge: Other (see comment) (private vehicle)  Transition of Care Consult (CM Consult): [address]: No  Reason Outside [place]: Patient already serviced by other home care/hospice agency (At [facility])  Discharge Durable Medical Equipment: No (has a rolling walker)  Physical Therapy Consult: Yes  Occupational Therapy Consult: Yes  Speech Therapy Consult: No  Current Support Network: Lives with Spouse (independent with ADLs)  Confirm Follow Up Transport: Family  Plan discussed with Pt/Family/Caregiver: Yes  Freedom of Choice Offered: Yes  Discharge Location  Discharge Placement: Home with home health    CM reviewed chart and received orders to resume home care. Pt was open to [facility] prior to admission and prefers to continue services with them. CM sent orders through [employer] to [facility]. [facility] has accepted and will continue home health services at time of discharge.   Gal Mckenna, BSW, ACM

## 2017-08-31 NOTE — CONSULTS
[address] [place], [address]       Yusef Reyes, CARRI Berkowitz, [facility]   ROSALINE Palmer NP        at [address]     [address] [place] [address], [address] [address]  22.     [phone]     FAX: [phone]    at [address], [address] [facility], [address]     [phone]     FAX: [phone]       HEPATOLOGY CONSULT NOTE  I was asked to see this patient in consultation by Chandu Anne NP  for management of presumed cirrhosis. I have reviewed the Emergency room note, Hospital admission note, Notes by all other physicians who have seen the patient during this hospitalization to date. I have reviewed the problem list and the reason for this hospitalization. I have reviewed the allergies and the medications the patient was taking at home prior to this hospitalization. HISTORY:  The patient is a 78year old  male with several medical problems listed in the EMR and other notes. He was hospitalized to undergo back surgery last week - 8/23. He developed lethargy, fatigue and abdominal distention and came back to the ED 6 days later. Imaging of the abdomen demonstrated colitis, ileus and and enlarged liver with changes consistent with cirrhosis. He tells me that he was told he had an enlarged liver 40 years ago. He says he has never seen a GI specialist for the liver and does not know if he ever had serologic testing screen for various causes of liver disease. ASSESSMENT AND PLAN:  Cirrhosis? The liver has changes consistent with cirrhosis on imaging. He says he was told he had an enlarged liver 40 years ago. The TBILI is elevated and serum albumin is low. The liver enzymes are elevated. The platelet count is normal, there is no ascites on imaging. He has no history of HE.   Will send off serologic studies to screen for various causes of liver disease. Will then just see him back in my office a few weeks after DC and do a fibroscan. This will tell us if he has cirrhosis. SYSTEM REVIEW:  Constitution systems: Negative for fever, chills, weight gain, weight loss. Eyes: Negative for visual changes. ENT: Negative for sore throat, painful swallowing. Respiratory: Negative for cough, hemoptysis, SOB. Cardiology: Negative for chest pain, palpitations. GI:  Negative for constipation or diarrhea. : Negative for urinary frequency, dysuria, hematuria, nocturia. Skin: Negative for rash. Hematology: Negative for easy bruising, blood clots. Musculo-skelatal: Negative for back pain, muscle pain, weakness. Neurologic: Negative for headaches, dizziness, vertigo, memory problems not related to HE. Psychology: Negative for anxiety, depression. FAMILY HISTORY:  There is no family history of liver disease. SOCIAL HISTORY:  The patient is . The patient has 4 children, and 7 grandchildren. The patient has never used tobacco products. The patient has never consumed significant amounts of alcohol. The patient used to work in home construction. PHYSICAL EXAMINATION:  VS: per nursing note  General:  No acute distress. Eyes:  Sclera anicteric. ENT:  No oral lesions. Thyroid normal.  Nodes:  No adenopathy. Skin:  No spider angiomata. No jaundice. Respiratory:  Lungs clear to auscultation. Cardiovascular:  Regular heart rate. Mildy tender. No rebound. No obvious ascites. Extremities:  No lower extremity edema. Neurologic:  Alert and oriented. Cranial nerves grossly intact. No asterixis. LABORATORY:  Results for Usama Alvarado (MRN [id number]) as of 8/31/2017 06:15   Ref.  Range 8/29/2017 18:00 8/30/2017 06:00 8/31/2017 04:47   WBC Latest Ref Range: 4.1 - 11.1 K/uL 18.9 (H) 14.7 (H) 15.0 (H)   HGB Latest Ref Range: 12.1 - 17.0 g/dL 10.4 (L) 8.5 (L) 8.7 (L)   PLATELET Latest Ref Range: 150 - 400 K/uL 408 (H) 226 230   INR Latest Ref Range: 0.9 - 1.1   1.3 (H)     Sodium Latest Ref Range: 136 - 145 mmol/L 134 (L) 138 137   Potassium Latest Ref Range: 3.5 - 5.1 mmol/L 3.1 (L) 3.2 (L) 3.6   Chloride Latest Ref Range: 97 - 108 mmol/L 97 106 107   CO2 Latest Ref Range: 21 - 32 mmol/L 26 22 20 (L)   Glucose Latest Ref Range: 65 - 100 mg/dL 114 (H) 97 83   BUN Latest Ref Range: 6 - 20 MG/DL 11 9 8   Creatinine Latest Ref Range: 0.70 - 1.30 MG/DL 0.87 0.57 (L) 0.52 (L)   Bilirubin, total Latest Ref Range: 0.2 - 1.0 MG/DL 1.8 (H) 1.6 (H)    Albumin Latest Ref Range: 3.5 - 5.0 g/dL 2.9 (L) 2.5 (L)    ALT (SGPT) Latest Ref Range: 12 - 78 U/L 112 (H) 87 (H)    AST Latest Ref Range: 15 - 37 U/L 109 (H) 87 (H)    Alk. phosphatase Latest Ref Range: 45 - 117 U/L 137 (H) 115        RADIOLOGY:  Ultrasound of liver. Echogenic consistent with chronic liver disease. No liver mass lesions. No dilated bile ducts. Minimal ascites. CT scan abdomen with IV contrast.  Changes consistent with cirrhosis. No liver mass lesions. No dilated bile ducts. Small ascites.       Felton Piña MD  [facility] [place] of [address] [facility] 27[address] [facility] [address] [facility] 7  [address] [facility] 22. [phone]

## 2017-08-31 NOTE — PROGRESS NOTES
Hospitalist Progress Note  Jerry Horowitz NP  Office: [phone]  Cell: [phone] 7a-5p      Date of Service:  2017  NAME:  Michael Ramirez  :  1940  MRN:  [id number]      Admission Summary:   78 yom with pmh of arthritis, coronary artery disease s/p PTCA stent, fatty liver, CHF, degenerative joint disease, HTN, MI, scoliosis, spinal stenosis, presented to the ED from home accompanied by his wife and   daughter with cc of feeling tired, fatigued, lethargic. Pt is a limited historian in this regard. His wife notes that she felt like the pt was more confused, lethargic, looking more fatigue which has been ongoing since he had his surgery. Pt reportedly had L2-L3, L3-L4, L4-L5 laminectomy, decompression and posterior lateral fusion of L1 to L5 on 2017. According to the wife, home health nurse visited the patient yesterday, collected some lab work, told that the pt was anemic with a decreased hgb and elevated WBC. She notes they were   advised to go to the hospital. Pt has been more fatigued, looking lethargic, confused. Pt reportedly had been prescribed oxycodone, which according to reports, made him have hallucinations. Reports pt has not had much narcotics and has been taking Tylenol for pain. Interval history / Subjective:   Patient resting in bed. Reports he feels fine today and wants to go home. Denies any chest pain, shortness of breath, fevers or chills, nausea or vomiting, or abdominal pain. Discussed with patient and family that patient is not ready for discharge. Discussed the concern for an obstruction as he is only having loose stools in his rectal tube.       Assessment & Plan:     Possible Pseudo-Obstruction s/p recent surgery  - CT abd imaging review by Dr Radha Worrell with GI who believes colon wall thickening likely from pseudo-obstruction not colitis  -only having loose stools via rectal tube  -discussed with Jace Piedra with GI, will try another enema today. Keep rectal tube in for today and monitor. Diarrhea   -likely from obstruction  -plan as above  -check for cdiff    Elevated Lipase  -unlikely pancreatitis  -lipase only mildly elevated and asymptomatic  -CT abd showing normal pancreatitis     Liver Cirrhosis  -seen on CT with elevated liver enzymes  -reports drinking at least 2 drinks per day  -check hepatic panel   -consult hepatology    Leukocytosis-remains elevated  -unclear etiology, possibly reactive from pseudo-obstruction vs possible infectious process   -afebrile  -continue IV Zosyn for now   - blood cultures NGTD    Anemia  -possibly related to previous surgery, denies any blood in urine or stool   -iron 34, iron % saturation 14, TIBC 237  -give dose of IV Iron today    Hypokalemia   -replenished  -still on lower side of normal, with diarrhea, continue with K in IVF  -monitor    H/o HTN   -stable, monitor    Code status: Full  DVT prophylaxis: SCDs    Care Plan discussed with: Patient/Family and Nurse Chel Perez GI NP   Disposition: Home w/Family, HH PT, OT, RN and TBD      Hospital Problems  Date Reviewed: 8/30/2017          Codes Class Noted POA    Leukocytosis ICD-10-CM: D72.829  ICD-9-CM: 288.60  8/30/2017 Yes        Anemia ICD-10-CM: D64.9  ICD-9-CM: 285.9  8/30/2017 Yes        Hypokalemia ICD-10-CM: E87.6  ICD-9-CM: 276.8  8/30/2017 Unknown        * (Principal)Pseudo-obstruction of colon ICD-10-CM: K59.8  ICD-9-CM: 564.89  8/30/2017 Unknown        Elevated liver enzymes ICD-10-CM: R74.8  ICD-9-CM: 790.5  8/30/2017 Yes        Ileus ([address] ICD-10-CM: K56.7  ICD-9-CM: 560.1  8/29/2017 Unknown        Generalized abdominal pain ICD-10-CM: R10.84  ICD-9-CM: 789.07  8/29/2017 Unknown                Review of Systems:   A comprehensive review of systems was negative except for that written in the HPI. Vital Signs:    Last 24hrs VS reviewed since prior progress note.  Most recent are:  Visit Vitals    BP 136/75 (BP 1 Location: Right arm, BP Patient Position: At rest)    Pulse 82    Temp 98.3 °F (36.8 °C)    Resp 15    Ht 5' 6\" (1.676 m)    Wt 69.9 kg (154 lb 1.6 oz)    SpO2 98%    BMI 24.87 kg/m2         Intake/Output Summary (Last 24 hours) at 08/31/17 1058  Last data filed at 08/30/17 1445   Gross per 24 hour   Intake              240 ml   Output                0 ml   Net              240 ml        Physical Examination:             Constitutional:  Male sitting on the side of bed in no acute distress   ENT:  Oral mucous moist, oropharynx benign. Neck supple,    Resp:  CTA bilaterally. No wheezing/rhonchi/rales. No accessory muscle use   CV:  Regular rhythm, normal rate, no murmurs, gallops, rubs    GI:  Soft, non distended, non tender. normoactive bowel sounds, no hepatosplenomegaly. Rectal tube with liquid brown stool     Musculoskeletal:  No edema, warm, 2+ pulses throughout    Neurologic:  Moves all extremities. AAOx3     Psych:  Good insight, Not anxious nor agitated.        Data Review:    Review and/or order of clinical lab test  Review and/or order of tests in the radiology section of CPT  Review and/or order of tests in the medicine section of CPT      Labs:     Recent Labs      08/31/17 0447 08/30/17   0600   WBC  15.0*  14.7*   HGB  8.7*  8.5*   HCT  26.0*  25.2*   PLT  230  226     Recent Labs      08/31/17 0447 08/30/17   0600 08/29/17   1800   NA  137  138  134*   K  3.6  3.2*  3.1*   CL  107  106  97   CO2  20*  22  26   BUN  8  9  11   CREA  0.52*  0.57*  0.87   GLU  83  97  114*   CA  7.6*  7.7*  8.5   MG   --   2.0   --    PHOS   --   2.6   --      Recent Labs      08/30/17   0600 08/29/17   1800   SGOT  87*  109*   ALT  87*  112*   AP  115  137*   TBILI  1.6*  1.8*  1.8*   TP  5.4*  6.7   ALB  2.5*  2.9*   GLOB  2.9  3.8   LPSE  723*  717*     Recent Labs      08/29/17   1800   INR  1.3*   PTP  13.7*      Recent Labs      08/31/17   0447   TIBC  237*   PSAT  14*      No results found for: FOL, RBCF   No results for input(s): PH, PCO2, PO2 in the last 72 hours. No results for input(s): CPK, CKNDX, TROIQ in the last 72 hours.     No lab exists for component: CPKMB  Lab Results   Component Value Date/Time    Cholesterol, total 105 08/30/2017 06:00 AM    HDL Cholesterol 19 08/30/2017 06:00 AM    LDL, calculated 56.4 08/30/2017 06:00 AM    Triglyceride 148 08/30/2017 06:00 AM    CHOL/HDL Ratio 5.5 08/30/2017 06:00 AM     Lab Results   Component Value Date/Time    Glucose (POC) 118 08/23/2017 10:35 AM     Lab Results   Component Value Date/Time    Color DARK YELLOW 08/29/2017 07:49 PM    Appearance CLOUDY 08/29/2017 07:49 PM    Specific gravity 1.022 08/29/2017 07:49 PM    pH (UA) 6.0 08/29/2017 07:49 PM    Protein NEGATIVE  08/29/2017 07:49 PM    Glucose NEGATIVE  08/29/2017 07:49 PM    Ketone NEGATIVE  08/29/2017 07:49 PM    Bilirubin NEGATIVE  08/16/2017 10:30 AM    Urobilinogen 2.0 08/29/2017 07:49 PM    Nitrites NEGATIVE  08/29/2017 07:49 PM    Leukocyte Esterase TRACE 08/29/2017 07:49 PM    Epithelial cells FEW 08/29/2017 07:49 PM    Bacteria NEGATIVE  08/29/2017 07:49 PM    WBC 0-4 08/29/2017 07:49 PM    RBC 0-5 08/29/2017 07:49 PM         Medications Reviewed:     Current Facility-Administered Medications   Medication Dose Route Frequency    mineral oil (FLEET) enema 135 mL  135 mL Rectal NOW    sodium chloride (NS) flush 5-10 mL  5-10 mL IntraVENous Q8H    sodium chloride (NS) flush 5-10 mL  5-10 mL IntraVENous PRN    ondansetron (ZOFRAN) injection 4 mg  4 mg IntraVENous Q6H PRN    piperacillin-tazobactam (ZOSYN) 3.375 g in 0.9% sodium chloride (MBP/ADV) 100 mL  3.375 g IntraVENous Q8H    atenolol (TENORMIN) tablet 50 mg  50 mg Oral DAILY    amLODIPine (NORVASC) tablet 5 mg  5 mg Oral DAILY    0.9% sodium chloride with KCl 40 mEq/L infusion   IntraVENous CONTINUOUS    metoclopramide HCl (REGLAN) injection 10 mg  10 mg IntraVENous Q6H ______________________________________________________________________  EXPECTED LENGTH OF STAY: 3d 19h  ACTUAL LENGTH OF STAY:          David [address] 72, NP

## 2017-08-31 NOTE — WOUND CARE
Wound Consult:  New Patient Visit. Chart reviewed. Spoke with patients nurse,  Elner Nageotte at bedside. Patient is up to chair and stands with Elner Nageotte to transfer to stretcher; he has a [place] bed pressure redistribution mattress. Staff consulted for pressure injury to left heel. Assessment:  Left heel - ~ 4 x 4 cm violet non-blanching discoloration of heel with blister forming; DTI POA per discussion with nursing clinical lead; no surrounding redness. Patient with FMS in place; culture pending for C diff. Ortho surgery has seen for recent incision to lumbar spine; S/P lumbar laminectomy 8/23. Patient to stretcher for transfer for testing. Recommendations:  1. Minimize layers of linen/pads under patient to optimize support surface. 2. Reposition: continue to turn and reposition approximately every 2 hours; use Prevalon boots. 3. Manage incontinence - currently with FMS; use barrier ointment to protect skin. 4. Continue to monitor nutrition, pain, and skin risk scale, and skin assessment. 5. Begin Venelex to left heel. Plan:  Spoke with Dr. Efren Solo regarding findings and obtained orders for treatment. We will continue to reassess routinely and as needed.   Gilmar Hill, RN,[facility]  Wound Healing Office [phone]  Pager [phone]

## 2017-08-31 NOTE — PROGRESS NOTES
Problem: Mobility Impaired (Adult and Pediatric)  Goal: *Acute Goals and Plan of Care (Insert Text)  Physical Therapy Goals  Initiated 8/31/2017  1. Patient will move from supine to sit and sit to supine in bed with independence within 5 day(s). 2. Patient will transfer from bed to chair and chair to bed with supervision/set-up using the least restrictive device within 5 day(s). 3. Patient will perform sit to stand with supervision/set-up within 5 day(s). 4. Patient will ambulate with supervision/set-up for 150 feet with the least restrictive device within 5 day(s). 5. Patient will ascend/descend stairs per home needs with supervision/set-up within 5 day(s). PHYSICAL THERAPY EVALUATION  Patient: Dorothy Sanchez (73 y.o. male)  Date: 8/31/2017  Primary Diagnosis: Acute pancreatitis  Generalized abdominal pain  Colitis, acute  Ileus (HCC)        Precautions: flexi-seal,  Fall, Back (vista TLSO)      ASSESSMENT :  Based on the objective data described below, the patient presents with generally decreased strength, balance, ROM, and loose stool with flexi-seal/discomfort resulting in limited functional mobility from baseline status. Pt denies back pain from 8/23 sx this date and 1* concern this morning is rectal tube. Pt initiatially deferring mobility though family present and supportive of OOB/encourgaing. Upon seated EOB, pt noted with have leaking from tubing; pt able to stand at [facility] for dependent cleaning by therapist.  Pt exhibited good balance for this task completion as well as gown change and brace donning. RW used during ambulation 2* increased PT demands for lines/leads. Pt encouraged to sit in chair following session for 30' increments and encouraged OOB as able. Pt left with RN, wife, and DTR(?), in NAD with needs being met. Anticipate return home with HHPT pending continued progress with therapy and during his acute stay.   Recommend OOB to chair for ALL MEALS and WALKING to bathroom for all voiding to increased strength and endurance. Pt also to benefit from TID walks in hallway with staff support at [facility]. PT: wean walker as appropriate and increase indep and activity tolerance for stair training for return home with family/HHPT. Patient will benefit from skilled intervention to address the above impairments. Patients rehabilitation potential is considered to be Good  Factors which may influence rehabilitation potential include:   [X]         None noted  [ ]         Mental ability/status  [ ]         Medical condition  [ ]         Home/family situation and support systems  [ ]         Safety awareness  [ ]         Pain tolerance/management  [ ]         Other:        PLAN :  Recommendations and Planned Interventions:  [X]           Bed Mobility Training             [ ]    Neuromuscular Re-Education  [X]           Transfer Training                   [ ]    Orthotic/Prosthetic Training  [X]           Gait Training                         [ ]    Modalities  [X]           Therapeutic Exercises           [ ]    Edema Management/Control  [X]           Therapeutic Activities            [X]    Patient and Family Training/Education  [ ]           Other (comment):     Frequency/Duration: Patient will be followed by physical therapy  4 times a week to address goals. Discharge Recommendations: Home Health  Further Equipment Recommendations for Discharge: pt owns RW from previous sx       SUBJECTIVE:   Patient stated You want me to walk now?       OBJECTIVE DATA SUMMARY:   HISTORY:    Past Medical History:   Diagnosis Date    Arthritis      CAD (coronary artery disease)       5 STENTS,  3 INITIAL 1997    Fatty liver       >40 YEARS    Heart failure ([facility] [address] 2000     ACUTE POST HIP REPLACEMENT     Hypertension      MI (myocardial infarction) ([facility] [address]       2000    Scoliosis       Past Surgical History:   Procedure Laterality Date    CARDIAC SURG PROCEDURE UNLIST         stent placement    HX CATARACT REMOVAL Bilateral      HX HEART CATHETERIZATION   1997    HX HERNIA REPAIR   1980     INGUINAL    HX PROSTATECTOMY   2007     NO CANCER, ELEVATED PSA    HX ROTATOR CUFF REPAIR Left 2010    HX TONSILLECTOMY        REVISE TOTAL HIP REPLACEMENT Left 2004    TOTAL HIP ARTHROPLASTY Left 2000     Prior Level of Function/Home Situation: pt post op at home briefly with RW and family support  Personal factors and/or comorbidities impacting plan of care: pt with possible obstruction post op     Home Situation  Home Environment:  (condo)  One/Two Story Residence: Two story  # of Interior Steps: 4  Living Alone: No  Support Systems: Child(jared), Spouse/Significant Other/Partner  Patient Expects to be Discharged to[de-identified] Private residence  Current DME Used/Available at Home: Brace/Splint, Walker, rolling  Tub or Shower Type: Tub/Shower combination     EXAMINATION/PRESENTATION/DECISION MAKING:   Critical Behavior:  Neurologic State: Alert, Drowsy  Orientation Level: Oriented X4 (forgetful at times)  Cognition: Appropriate decision making, Decreased attention/concentration, Follows commands  Safety/Judgement: Awareness of environment, Fall prevention  Hearing: Auditory  Auditory Impairment: None  Range Of Motion:  AROM: Generally decreased, functional           PROM:  (NT)           Strength:    Strength: Generally decreased, functional                    Tone & Sensation:   Tone: Normal              Sensation:  (NT)               Coordination:  Coordination: Within functional limits  Functional Mobility:  Bed Mobility:  Rolling: Minimum assistance; Additional time (cues for logroll to left)  Supine to Sit: Stand-by asssistance; Additional time  Sit to Supine:  (NT; OOB to chair)  Scooting: Contact guard assistance  Transfers:  Sit to Stand: Contact guard assistance; Additional time  Stand to Sit: Contact guard assistance; Additional time (cues for safety and alignment)                       Balance:   Sitting: Intact  Standing: Intact; With support  Standing - Static: Good  Standing - Dynamic : Fair  Ambulation/Gait Training:  Distance (ft): 30 Feet (ft) (x2)  Assistive Device: Gait belt;Brace/Splint; Walker, rolling  Ambulation - Level of Assistance: Contact guard assistance (line management assist; no balance deficits at RW noted)     Gait Description (WDL): Exceptions to WDL  Gait Abnormalities: Decreased step clearance (tentative gait with flexi-seal)              Speed/Yuko: Slow;Pace decreased (<100 feet/min)  Step Length: Right shortened;Left shortened                 Stairs:     Stairs - Level of Assistance:  (NT)                    Functional Measure:  Tinetti test:      Sitting Balance: 1  Arises: 1  Attempts to Rise: 2  Immediate Standing Balance: 1  Standing Balance: 1  Nudged: 0 (NT)  Eyes Closed: 0 (NT)  Turn 360 Degrees - Continuous/Discontinuous: 0  Turn 360 Degrees - Steady/Unsteady: 0  Sitting Down: 1  Balance Score: 7  Indication of Gait: 1  R Step Length/Height: 0  L Step Length/Height: 0  R Foot Clearance: 1  L Foot Clearance: 1  Step Symmetry: 1  Step Continuity: 0  Path: 1  Trunk: 0  Walking Time: 0  Gait Score: 5  Total Score: 12         Tinetti Test and G-code impairment scale:  Percentage of Impairment CH     0%    CI     1-19% CJ     20-39% CK     40-59% CL     60-79% CM     80-99% CN      100%   Tinetti  Score 0-28 28 23-27 17-22 12-16 6-11 1-5 0          Tinetti Tool Score Risk of Falls  <19 = High Fall Risk  19-24 = Moderate Fall Risk  25-28 = Low Fall Risk  Tinetti ME. Performance-Oriented Assessment of Mobility Problems in Elderly Patients. [address] 68; [id number].  (Scoring Description: PT Bulletin Feb. 10, 1993)     Older adults: Tripp Patel et al, 2009; n = 1000 [facility] elderly evaluated with ABC, BENNIE, ADL, and IADL)  · Mean BENNIE score for males aged 69-68 years = 26.21(3.40)  · Mean BENNIE score for females age 69-68 years = 25.16(4.30)  · Mean BENNIE score for males over 80 years = 23.29(6.02)  · Mean BENNIE score for females over 80 years = 17.20(8.32)            G codes: In compliance with CMSs Claims Based Outcome Reporting, the following G-code set was chosen for this patient based on their primary functional limitation being treated: The outcome measure chosen to determine the severity of the functional limitation was the Tinietti with a score of 12/28 which was correlated with the impairment scale. · Mobility - Walking and Moving Around:               - CURRENT STATUS:    CK - 40%-59% impaired, limited or restricted               - GOAL STATUS:           CJ - 20%-39% impaired, limited or restricted               - D/C STATUS:                       ---------------To be determined---------------      Pain:  Pain Scale 1: Numeric (0 - 10)  Pain Intensity 1: 0              Activity Tolerance:   NAD  Please refer to the flowsheet for vital signs taken during this treatment. After treatment:   [X]         Patient left in no apparent distress sitting up in chair  [ ]         Patient left in no apparent distress in bed  [ ]         Call bell left within reach  [X]         Nursing present  [X]         Caregiver present  [ ]         Bed alarm activated      COMMUNICATION/EDUCATION:   The patients plan of care was discussed with: Registered Nurse.  [X]         Fall prevention education was provided and the patient/caregiver indicated understanding. [X]         Patient/family have participated as able in goal setting and plan of care. [X]         Patient/family agree to work toward stated goals and plan of care. [ ]         Patient understands intent and goals of therapy, but is neutral about his/her participation. [ ]         Patient is unable to participate in goal setting and plan of care.      Thank you for this referral.  Romina Perez   Time Calculation: 19 mins

## 2017-08-31 NOTE — PROGRESS NOTES
Problem: Self Care Deficits Care Plan (Adult)  Goal: *Acute Goals and Plan of Care (Insert Text)  Occupational Therapy Goals  Initiated 8/30/2017    1. Patient will perform lower body dressing with modified independence using AE PRN within 7 days. 2. Patient will perform bathing with supervision/set-up using most appropriate DME within 7 days. 3. Patient will toileting at modified independence within 7 days. 4. Patient will don/doff back brace at [place] within 7 days. 5. Patient will verbalize/demonstrate 3/3 back precautions during ADL tasks without cues within 7 days. OCCUPATIONAL THERAPY TREATMENT  Patient: Geena Farrar (56 y.o. male)  Date: 8/31/2017  Diagnosis: Acute pancreatitis  Generalized abdominal pain  Colitis, acute  Ileus (HCC) Pseudo-obstruction of colon       Precautions: Fall, Back (vista TLSO)      ASSESSMENT:  Pt making steady progress with acute therapy. Pt continues to need encouragement for OOB tasks. Pt is able to recall 3/3 back precautions with cues, yet does continue to need verbal cues/supervision to maintain adherence with tasks. Pt now with rectal tube, increased drainage from back site (new dressing on back), L heel wound. Pt performed bed mobility via log roll with supervision, cues to avoid twisting. Pt requires significant assistance with LB ADLs; expressing interests in AE (reacher); will provide training/education next session. Pt able to don brace supervision and perform functional transfers with CGA, cues for RW safety, additional time for all tasks. Pt with decreased attention/concentration to tasks noted, impaired memory? ? Discussed with RN who reports family has not mentioned change in mental status. May benefit from [facility]. Anticipate discharge home with wife, home health services. Pt left in chair, call bell in reach, pt aware of 30 minute sitting restriction, RN notified.      Progression toward goals:  [X]       Improving appropriately and progressing toward goals  [ ] Improving slowly and progressing toward goals  [ ]       Not making progress toward goals and plan of care will be adjusted       PLAN:  Patient continues to benefit from skilled intervention to address the above impairments. Continue treatment per established plan of care. Discharge Recommendations:  Home Health  Further Equipment Recommendations for Discharge:  Hip kit/reacher       SUBJECTIVE:   Patient stated I may want a reacher.       OBJECTIVE DATA SUMMARY:   Cognitive/Behavioral Status:  Neurologic State: Alert;Drowsy  Orientation Level: Oriented X4  Cognition: Follows commands;Decreased attention/concentration; Appropriate decision making  Perception: Appears intact  Perseveration: No perseveration noted  Safety/Judgement: Awareness of environment;Decreased insight into deficits     Functional Mobility and Transfers for ADLs:  Bed Mobility:  Rolling: Minimum assistance; Additional time (cues for logroll to left)  Supine to Sit: Contact guard assistance; Additional time  Sit to Supine:  (NT; OOB to chair)  Scooting: Contact guard assistance     Transfers:  Sit to Stand: Contact guard assistance        Balance:  Sitting: Intact  Standing: Intact; With support  Standing - Static: Good  Standing - Dynamic : Fair     ADL Intervention:                          Upper Body Dressing Assistance  Orthotics(Brace): Supervision/set-up     Lower Body Dressing Assistance  Socks: Total assistance (dependent)           Cognitive Retraining  Safety/Judgement: Awareness of environment;Decreased insight into deficits        Activity Tolerance:   VSS     Please refer to the flowsheet for vital signs taken during this treatment.   After treatment:   [X] Patient left in no apparent distress sitting up in chair  [ ] Patient left in no apparent distress in bed  [X] Call bell left within reach  [X] Nursing notified  [ ] Caregiver present  [ ] Bed alarm activated      COMMUNICATION/COLLABORATION:   The patients plan of care was discussed with: Physical Therapist and Registered Nurse     Jalen Thorpe OT  Time Calculation: 18 mins

## 2017-09-01 LAB
ALBUMIN SERPL-MCNC: 2.7 G/DL (ref 3.5–5)
ALBUMIN/GLOB SERPL: 1.1 {RATIO} (ref 1.1–2.2)
ALP SERPL-CCNC: 128 U/L (ref 45–117)
ALT SERPL-CCNC: 102 U/L (ref 12–78)
ANION GAP SERPL CALC-SCNC: 11 MMOL/L (ref 5–15)
AST SERPL-CCNC: 96 U/L (ref 15–37)
BASOPHILS # BLD: 0 K/UL (ref 0–0.1)
BASOPHILS NFR BLD: 0 % (ref 0–1)
BILIRUB DIRECT SERPL-MCNC: 0.8 MG/DL (ref 0–0.2)
BILIRUB SERPL-MCNC: 1.5 MG/DL (ref 0.2–1)
BUN SERPL-MCNC: 4 MG/DL (ref 6–20)
BUN/CREAT SERPL: 8 (ref 12–20)
CALCIUM SERPL-MCNC: 7.7 MG/DL (ref 8.5–10.1)
CHLORIDE SERPL-SCNC: 104 MMOL/L (ref 97–108)
CO2 SERPL-SCNC: 21 MMOL/L (ref 21–32)
CREAT SERPL-MCNC: 0.51 MG/DL (ref 0.7–1.3)
DIFFERENTIAL METHOD BLD: ABNORMAL
EOSINOPHIL # BLD: 0.3 K/UL (ref 0–0.4)
EOSINOPHIL NFR BLD: 2 % (ref 0–7)
ERYTHROCYTE [DISTWIDTH] IN BLOOD BY AUTOMATED COUNT: 14.2 % (ref 11.5–14.5)
FERRITIN SERPL-MCNC: 1059 NG/ML (ref 26–388)
GLOBULIN SER CALC-MCNC: 2.5 G/DL (ref 2–4)
GLUCOSE SERPL-MCNC: 86 MG/DL (ref 65–100)
HBV SURFACE AB SER QL: NONREACTIVE
HBV SURFACE AB SER-ACNC: <3.1 MIU/ML
HBV SURFACE AG SER QL: <0.1 INDEX
HBV SURFACE AG SER QL: NEGATIVE
HCT VFR BLD AUTO: 27.3 % (ref 36.6–50.3)
HCV AB SERPL QL IA: NONREACTIVE
HCV COMMENT,HCGAC: NORMAL
HGB BLD-MCNC: 9.1 G/DL (ref 12.1–17)
IRON SATN MFR SERPL: 75 % (ref 20–50)
IRON SERPL-MCNC: 191 UG/DL (ref 35–150)
LYMPHOCYTES # BLD: 0.9 K/UL (ref 0.8–3.5)
LYMPHOCYTES NFR BLD: 7 % (ref 12–49)
MCH RBC QN AUTO: 32.4 PG (ref 26–34)
MCHC RBC AUTO-ENTMCNC: 33.3 G/DL (ref 30–36.5)
MCV RBC AUTO: 97.2 FL (ref 80–99)
METAMYELOCYTES NFR BLD MANUAL: 1 %
MONOCYTES # BLD: 1.4 K/UL (ref 0–1)
MONOCYTES NFR BLD: 11 % (ref 5–13)
MYELOCYTES NFR BLD MANUAL: 1 %
NEUTS BAND NFR BLD MANUAL: 1 %
NEUTS SEG # BLD: 10.2 K/UL (ref 1.8–8)
NEUTS SEG NFR BLD: 77 % (ref 32–75)
PLATELET # BLD AUTO: 206 K/UL (ref 150–400)
POTASSIUM SERPL-SCNC: 3.4 MMOL/L (ref 3.5–5.1)
PROT SERPL-MCNC: 5.2 G/DL (ref 6.4–8.2)
RBC # BLD AUTO: 2.81 M/UL (ref 4.1–5.7)
RBC MORPH BLD: ABNORMAL
RBC MORPH BLD: ABNORMAL
SODIUM SERPL-SCNC: 136 MMOL/L (ref 136–145)
TIBC SERPL-MCNC: 254 UG/DL (ref 250–450)
WBC # BLD AUTO: 13.1 K/UL (ref 4.1–11.1)
WBC MORPH BLD: ABNORMAL

## 2017-09-01 PROCEDURE — 74011250636 HC RX REV CODE- 250/636: Performed by: FAMILY MEDICINE

## 2017-09-01 PROCEDURE — 82728 ASSAY OF FERRITIN: CPT | Performed by: INTERNAL MEDICINE

## 2017-09-01 PROCEDURE — 74011250637 HC RX REV CODE- 250/637: Performed by: HOSPITALIST

## 2017-09-01 PROCEDURE — 86803 HEPATITIS C AB TEST: CPT | Performed by: INTERNAL MEDICINE

## 2017-09-01 PROCEDURE — 80048 BASIC METABOLIC PNL TOTAL CA: CPT | Performed by: INTERNAL MEDICINE

## 2017-09-01 PROCEDURE — 74011250636 HC RX REV CODE- 250/636: Performed by: SURGERY

## 2017-09-01 PROCEDURE — 80076 HEPATIC FUNCTION PANEL: CPT | Performed by: INTERNAL MEDICINE

## 2017-09-01 PROCEDURE — 82103 ALPHA-1-ANTITRYPSIN TOTAL: CPT | Performed by: INTERNAL MEDICINE

## 2017-09-01 PROCEDURE — 86708 HEPATITIS A ANTIBODY: CPT | Performed by: INTERNAL MEDICINE

## 2017-09-01 PROCEDURE — 36415 COLL VENOUS BLD VENIPUNCTURE: CPT | Performed by: INTERNAL MEDICINE

## 2017-09-01 PROCEDURE — 85025 COMPLETE CBC W/AUTO DIFF WBC: CPT | Performed by: INTERNAL MEDICINE

## 2017-09-01 PROCEDURE — 87340 HEPATITIS B SURFACE AG IA: CPT | Performed by: INTERNAL MEDICINE

## 2017-09-01 PROCEDURE — 86704 HEP B CORE ANTIBODY TOTAL: CPT | Performed by: INTERNAL MEDICINE

## 2017-09-01 PROCEDURE — 74011250636 HC RX REV CODE- 250/636: Performed by: NURSE PRACTITIONER

## 2017-09-01 PROCEDURE — 83540 ASSAY OF IRON: CPT | Performed by: INTERNAL MEDICINE

## 2017-09-01 PROCEDURE — 86706 HEP B SURFACE ANTIBODY: CPT | Performed by: INTERNAL MEDICINE

## 2017-09-01 PROCEDURE — 65270000032 HC RM SEMIPRIVATE

## 2017-09-01 PROCEDURE — 74011000258 HC RX REV CODE- 258: Performed by: FAMILY MEDICINE

## 2017-09-01 RX ADMIN — PIPERACILLIN SODIUM,TAZOBACTAM SODIUM 3.38 G: 3; .375 INJECTION, POWDER, FOR SOLUTION INTRAVENOUS at 07:02

## 2017-09-01 RX ADMIN — CASTOR OIL AND BALSAM, PERU: 788; 87 OINTMENT TOPICAL at 09:00

## 2017-09-01 RX ADMIN — ATENOLOL 50 MG: 50 TABLET ORAL at 08:59

## 2017-09-01 RX ADMIN — Medication 10 ML: at 13:38

## 2017-09-01 RX ADMIN — PIPERACILLIN SODIUM,TAZOBACTAM SODIUM 3.38 G: 3; .375 INJECTION, POWDER, FOR SOLUTION INTRAVENOUS at 13:34

## 2017-09-01 RX ADMIN — METOCLOPRAMIDE 10 MG: 5 INJECTION, SOLUTION INTRAMUSCULAR; INTRAVENOUS at 18:38

## 2017-09-01 RX ADMIN — PIPERACILLIN SODIUM,TAZOBACTAM SODIUM 3.38 G: 3; .375 INJECTION, POWDER, FOR SOLUTION INTRAVENOUS at 21:25

## 2017-09-01 RX ADMIN — METOCLOPRAMIDE 10 MG: 5 INJECTION, SOLUTION INTRAMUSCULAR; INTRAVENOUS at 23:32

## 2017-09-01 RX ADMIN — SODIUM CHLORIDE AND POTASSIUM CHLORIDE: 9; 2.98 INJECTION, SOLUTION INTRAVENOUS at 13:34

## 2017-09-01 RX ADMIN — CASTOR OIL AND BALSAM, PERU: 788; 87 OINTMENT TOPICAL at 18:38

## 2017-09-01 RX ADMIN — METOCLOPRAMIDE 10 MG: 5 INJECTION, SOLUTION INTRAMUSCULAR; INTRAVENOUS at 00:28

## 2017-09-01 RX ADMIN — AMLODIPINE BESYLATE 5 MG: 5 TABLET ORAL at 08:59

## 2017-09-01 RX ADMIN — METOCLOPRAMIDE 10 MG: 5 INJECTION, SOLUTION INTRAMUSCULAR; INTRAVENOUS at 12:16

## 2017-09-01 RX ADMIN — Medication 10 ML: at 21:26

## 2017-09-01 RX ADMIN — METOCLOPRAMIDE 10 MG: 5 INJECTION, SOLUTION INTRAMUSCULAR; INTRAVENOUS at 07:02

## 2017-09-01 NOTE — PROGRESS NOTES
Patient improved  nv stable  No organisms on gram stain, follow culture  Wound with some drainage  Follow wbcs

## 2017-09-01 NOTE — PROGRESS NOTES
NUTRITION COMPLETE ASSESSMENT    RECOMMENDATIONS:   1. Advance diet as tolerated  2. Weekly weights     Interventions/Plan:   Food/Nutrient Delivery:           RD to follow diet advancement, po intake, wt status    Assessment:   Reason for Assessment:   [x]NPO/Clear Liquid     Diet: Clear liquids  Supplements: Ensure Clear TID  Nutritionally Significant Medications: [x] Reviewed & Includes: Reglan, Potassium chloride, NS @125 mL/hr  Meal Intake: Patient Vitals for the past 100 hrs:   % Diet Eaten   09/01/17 1405 100 %   08/30/17 1445 75 %       Pre-Hospitalization:  Usual Appetite: Good    Current Hospitalization:   Fluid Restriction:  none  Appetite: Good  PO Ability: Independent Average po intake:%  Average supplements intake:  pt has not received      Subjective:  Pt resting; spoke with family    Objective:  Pt seen for nutrition risk- on clear liquid diet. Pt admitted with acute pancreatitis, abdominal pain, colitis, acute ileus. PMHx: arthritis, coronary artery disease status post PTCA stent, fatty liver, CHF, degenerative joint disease, hypertension, myocardial infarction, scoliosis, spinal stenosis. Reviewed chart, spoke with family. Recently hospitalized for lower back surgery (  L2-L3, L3-L4, L4-L5 laminectomy, decompression and posterior lateral fusion of L1 to L5 on 08/23/2017). Pt NPO on admission (8/29/17) and diet advance to Clears last night- pt tolerating well (%); Ensure Clear added this morning- family states pt has not received. Family asking for diet to be advanced but awaiting physician approval.  Liver cirrhosis seen on CT with elevated enzymes; liver serology pending. Noted hypokalemia; being repleted. Noted unlikely pancreatitis; pt with leukocytosis, unclear etiology. RD to follow diet advancement, po intake, wt status.       Estimated Nutrition Needs:   Kcals/day: 1789 Kcals/day (9055-8434 kcal/day (MSJ x 1.2-1.3))  Protein: 56 g (56-70g/day (0.8-1g/kg))  Fluid: 1700 ml (1mL/kcal)  Based On: [place] [facility] [employer]  Weight Used: Actual wt    Pt expected to meet estimated nutrient needs:  []   Yes     []  No [x] Unable to predict at this time    Nutrition Diagnosis:   1. Inadequate protein-energy intake related to pt on clear liquid diet; NPO 2 days as evidenced by  pt on clear liquid diet    Goals:      Pt diet to advance in next 1-2 days; consume at least 50% supplement     Monitoring & Evaluation:    - Total energy intake   - Weight/weight change   -      Previous Nutrition Goals Met:   N/A  Previous Recommendations:    N/A    Education & Discharge Needs:   [x] None Identified   [] Identified and addressed    [] Participated in care plan, discharge planning, and/or interdisciplinary rounds        Cultural, Christianity and ethnic food preferences identified: None    Skin Integrity: []Intact  [x]Other-  Surgical incision lower back  Edema: [x]None []Other  Last BM: 9/1/2017  Food Allergies: [x]None []Other  Diet Restrictions: Cultural/Jain Preference(s): None     Anthropometrics:    Weight Loss Metrics 8/30/2017 8/29/2017 8/23/2017 8/16/2017 7/20/2015   Today's Wt 154 lb 1.6 oz - 150 lb 150 lb 145 lb   BMI - 24.87 kg/m2 24.21 kg/m2 24.21 kg/m2 23.41 kg/m2      Weight Source: Bed  Height: 5' 6\" (167.6 cm),    Body mass index is 24.87 kg/(m^2).    ,     ,      Labs:  Lab Results   Component Value Date/Time    Sodium 136 09/01/2017 08:58 AM    Potassium 3.4 09/01/2017 08:58 AM    Chloride 104 09/01/2017 08:58 AM    CO2 21 09/01/2017 08:58 AM    Glucose 86 09/01/2017 08:58 AM    BUN 4 09/01/2017 08:58 AM    Creatinine 0.51 09/01/2017 08:58 AM    Calcium 7.7 09/01/2017 08:58 AM    Magnesium 2.0 08/30/2017 06:00 AM    Phosphorus 2.6 08/30/2017 06:00 AM    Albumin 2.7 09/01/2017 07:00 AM     Lab Results   Component Value Date/Time    Hemoglobin A1c 5.3 08/16/2017 09:34 AM         David Hickey RD

## 2017-09-01 NOTE — PROGRESS NOTES
No complaints today. Tm 98.3 HR: 78 BP: 151/78 Resp Rate: 16 per minute 98% sat on room air. Intake/Output Summary (Last 24 hours) at 09/01/17 1042  Last data filed at 09/01/17 0729   Gross per 24 hour   Intake              240 ml   Output             1100 ml   Net             -860 ml   Exam: Cor: RRR. Lungs: Bilat BS, Clear to ausculatation. Abd: Soft, Somewhat distended. Non tender. No associated rebound or guarding. Labs:   Recent Results (from the past 12 hour(s))   HEPATIC FUNCTION PANEL    Collection Time: 09/01/17  7:00 AM   Result Value Ref Range    Protein, total 5.2 (L) 6.4 - 8.2 g/dL    Albumin 2.7 (L) 3.5 - 5.0 g/dL    Globulin 2.5 2.0 - 4.0 g/dL    A-G Ratio 1.1 1.1 - 2.2      Bilirubin, total 1.5 (H) 0.2 - 1.0 MG/DL    Bilirubin, direct 0.8 (H) 0.0 - 0.2 MG/DL    Alk. phosphatase 128 (H) 45 - 117 U/L    AST (SGOT) 96 (H) 15 - 37 U/L    ALT (SGPT) 102 (H) 12 - 78 U/L   FERRITIN    Collection Time: 09/01/17  7:00 AM   Result Value Ref Range    Ferritin 1059 (H) 26 - 388 NG/ML   IRON PROFILE    Collection Time: 09/01/17  7:00 AM   Result Value Ref Range    Iron 191 (H) 35 - 150 ug/dL    TIBC 254 250 - 450 ug/dL    Iron % saturation 75 (H) 20 - 50 %   CBC WITH AUTOMATED DIFF    Collection Time: 09/01/17  8:58 AM   Result Value Ref Range    WBC 13.1 (H) 4.1 - 11.1 K/uL    RBC 2.81 (L) 4.10 - 5.70 M/uL    HGB 9.1 (L) 12.1 - 17.0 g/dL    HCT 27.3 (L) 36.6 - 50.3 %    MCV 97.2 80.0 - 99.0 FL    MCH 32.4 26.0 - 34.0 PG    MCHC 33.3 30.0 - 36.5 g/dL    RDW 14.2 11.5 - 14.5 %    PLATELET 365 298 - 162 K/uL    NEUTROPHILS 77 (H) 32 - 75 %    BAND NEUTROPHILS 1 %    LYMPHOCYTES 7 (L) 12 - 49 %    MONOCYTES 11 5 - 13 %    EOSINOPHILS 2 0 - 7 %    BASOPHILS 0 0 - 1 %    METAMYELOCYTES 1 %    MYELOCYTES 1 %    ABS. NEUTROPHILS 10.2 (H) 1.8 - 8.0 K/UL    ABS. LYMPHOCYTES 0.9 0.8 - 3.5 K/UL    ABS. MONOCYTES 1.4 (H) 0.0 - 1.0 K/UL    ABS. EOSINOPHILS 0.3 0.0 - 0.4 K/UL    ABS. BASOPHILS 0.0 0.0 - 0.1 K/UL    DF MANUAL      RBC COMMENTS ANISOCYTOSIS  1+        RBC COMMENTS POLYCHROMASIA  PRESENT        WBC COMMENTS HYPERSEGMENTED POLYS     METABOLIC PANEL, BASIC    Collection Time: 09/01/17  8:58 AM   Result Value Ref Range    Sodium 136 136 - 145 mmol/L    Potassium 3.4 (L) 3.5 - 5.1 mmol/L    Chloride 104 97 - 108 mmol/L    CO2 21 21 - 32 mmol/L    Anion gap 11 5 - 15 mmol/L    Glucose 86 65 - 100 mg/dL    BUN 4 (L) 6 - 20 MG/DL    Creatinine 0.51 (L) 0.70 - 1.30 MG/DL    BUN/Creatinine ratio 8 (L) 12 - 20      GFR est AA >60 >60 ml/min/1.73m2    GFR est non-AA >60 >60 ml/min/1.73m2    Calcium 7.7 (L) 8.5 - 10.1 MG/DL   Reviewed imaging studies. At this point in time, do not believe that there is an indication for cholecystectomy. Dr. Finn Brood input - noted. Dr. Zeke Dumas following. Plans per Dr. Masha Lyman. Following.

## 2017-09-01 NOTE — PROGRESS NOTES
Orthopedic Spine Progress Note  Marvin Healy, AGACNP-BC  Work Cell: [phone]        September 1, 2017 12:39 PM     [place]    HPI    [place] is a 78 y.o. male with history of scoliosis and chronic back pain with neurogenic claudication. He underwent a L1-5 Lumbar fusion on 8/23/2017 for multilevel spondylosis with varying degrees of spinal stenosis and foraminal stenosis. Shaun Greene His surgical drain was removed on 8/25/2017. He was discharged home with his wife in stable condition on postoperative day 4. Patient's wife reports that Mr. Wade Kelsey became lethargic at home. He developed some abdominal pain, nausea, and abdominal distension. He did not have any fevers or chills. He denied headaches. He was having loose watery and stools without evidence of blood in stools. His home health nurse found him to be anemic with an elevated WBC and he was advised to go to the ED. On admission he was found to have elevated lipase, elevated liver enzymes, WBC count of 18.9, and hemoglobin of 10.4. He was admitted for workup for possible acute colitis, ileus, acute pancreatitis, and hepatic cirrhosis. The orthopedic spine service was consulted for postoperative evaluation.      Subjective:   Patient more alert this morning. States he is feeling better and would like to go home. Wound examined and dressing changed. No wound dehiscence. No significant surrounding erythema. Yesterday,  underlying fluid collection was aspirated at bedside (approximately 200 cc). Sample was sent for cultures and cell count. Cultures pending. Patient denies abd pain. Rectal tube in place with drainage of brown liquid stool around the tube noted. HIDA scan negative for acute choleycystitis. Patient still with generalized malaise. He denies headache, vision changes, dyspnea, lumbago, leg pain, or paresthesias in his extremities.      **Maintain clean area to lower back to prevent stool from getting in or near lumbar dressing or incision** Objective:   General: alert, cooperative, no distress. EENT: EOMI. Anicteric sclerae. Oral mucous moist, oropharynx benign  Resp: CTA bilaterally. No wheezing/rhonchi/rales. No accessory muscle use  CV: Regular rhythm, normal rate, no murmurs, gallops, rubs. No cyanosis or clubbing. No edema appreciated in the extremities. Gastrointestinal:  Soft, non-tender. normoactive bowel sounds, no hepatosplenomegaly  Neurological: Follows commands. Speech clear. Affect normal.  DAI. Strength 5/5 in BUE and BLE. Sensation stable. Musculoskeletal:  Calves soft, supple, non-tender upon palpation or with passive stretch. Psych: Good insight. Not anxious nor agitated. Skin: Incision - clean, dry and intact. No significant surrounding erythema or swelling.     Dressing: clean, dry, and intact       Vital Signs:    Patient Vitals for the past 8 hrs:   BP Temp Pulse Resp SpO2   17 0732 151/78 97.9 °F (36.6 °C) 78 16 98 %     Temp (24hrs), Av.9 °F (36.6 °C), Min:97.7 °F (36.5 °C), Max:98 °F (36.7 °C)      Intake/Output:  701 -  190  In: 240 [P.O.:240]  Out: 900 [Urine:900]   190 -  0700  In: -   Out: 800 [Urine:200; Drains:600]    Pain Control:   Pain Assessment  Pain Scale 1: Numeric (0 - 10)  Pain Intensity 1: 0  Pain Onset 1: week  Pain Location 1: Back, Head  Pain Orientation 1: Lower  Pain Description 1: Sore  Pain Intervention(s) 1: Position    LAB:    Recent Labs      17   0858   HCT  27.3*   HGB  9.1*     Lab Results   Component Value Date/Time    Sodium 136 2017 08:58 AM    Potassium 3.4 2017 08:58 AM    Chloride 104 2017 08:58 AM    CO2 21 2017 08:58 AM    Glucose 86 2017 08:58 AM    BUN 4 2017 08:58 AM    Creatinine 0.51 2017 08:58 AM    Calcium 7.7 2017 08:58 AM       PT/OT:   Gait:  Gait  Speed/Yuko: Slow, Pace decreased (<100 feet/min)  Step Length: Right shortened, Left shortened  Gait Abnormalities: Decreased step clearance (tentative gait with flexi-seal)  Ambulation - Level of Assistance: Contact guard assistance (line management assist; no balance deficits at RW noted)  Distance (ft): 30 Feet (ft) (x2)  Assistive Device: Gait belt, Brace/Splint, Walker, rolling  Stairs - Level of Assistance:  (NT)                 Assessment/Plan:    1. Leukocytosis   -WBC trending down   -no fevers   -urinalysis negative   -blood cultures from 8/29 NGTD   -Lumbar superficial fluid collection aspirated 8/31, cultures NGTD   -cont empirical Zosyn per hospitalist    2. Suspected pseudo-obstruction   -GI following. Relistor if other options fail.   -Rectal tube in place   -fleet enema   -limit opioids, ivf    3. Elevated liver enzymes   -Hx of ETOH use   -hepatology consulted for cirrhosis noted on CT    4. Elevated Lipase   -768 today   -no evidence of pancreatitis on CT   -GI following    5. Possible colitis   -c-diff pending   -GI following    6.  Anemia   -hgb up to 9.1   -occult stool negative   -Cont to monitor for bleeding   -s/p Venofer   -management per hospitalist         Discharge To:   Pending     Signed By: Neno Aldridge NP

## 2017-09-01 NOTE — PROGRESS NOTES
Hospitalist Progress Note  Dale Perkins MD  Office: [phone]        Date of Service:  2017  NAME:  Flavio Amador  :  1940  MRN:  [id number]      Admission Summary:   78 yom with pmh of arthritis, coronary artery disease s/p PTCA stent, fatty liver, CHF, degenerative joint disease, HTN, MI, scoliosis, spinal stenosis, presented to the ED from home accompanied by his wife and   daughter with cc of feeling tired, fatigued, lethargic. Pt is a limited historian in this regard. His wife notes that she felt like the pt was more confused, lethargic, looking more fatigue which has been ongoing since he had his surgery. Pt reportedly had L2-L3, L3-L4, L4-L5 laminectomy, decompression and posterior lateral fusion of L1 to L5 on 2017. According to the wife, home health nurse visited the patient yesterday, collected some lab work, told that the pt was anemic with a decreased hgb and elevated WBC. She notes they were   advised to go to the hospital. Pt has been more fatigued, looking lethargic, confused. Pt reportedly had been prescribed oxycodone, which according to reports, made him have hallucinations. Reports pt has not had much narcotics and has been taking Tylenol for pain. Interval history / Subjective:   Patient resting in bed. No complaint. Assessment & Plan:     Possible Pseudo-Obstruction s/p recent surgery  - CT abd imaging review by Dr Emily Echeverria with GI who believes colon wall thickening likely from pseudo-obstruction not colitis  -only having loose stools via rectal tube  -will continue to follow GI recommendations.     Diarrhea   -likely from obstruction  -plan as above  -cdiff negative    Elevated Lipase  -unlikely pancreatitis  -lipase only mildly elevated and asymptomatic  -CT abd showing normal pancreatitis     Liver Cirrhosis  -seen on CT with elevated liver enzymes  -reports drinking at least 2 drinks per day  -liver serology results pending  -consult hepatology    Leukocytosis-remains elevated  -unclear etiology, possibly reactive from pseudo-obstruction vs possible infectious process   -afebrile  -continue IV Zosyn for now   - blood cultures NGTD  -back aspirate cx negative  -Pt afebrile. Will repeat cbc today and if still elevated will obtain ID opinion. Anemia  -multifactorial including chronic disease and recent surgery. -pt had iv iron yesterday and today's iron study improved compared to previous. Hypokalemia   -replenished  -still on lower side of normal, with diarrhea, continue with K in IVF  -monitor    H/o HTN   -stable, monitor    Code status: Full  DVT prophylaxis: SCDs    Care Plan discussed with: Patient/Family and Nurse    Disposition: Home w/Family, HH PT, OT, RN and TBD      Hospital Problems  Date Reviewed: 8/30/2017          Codes Class Noted POA    Leukocytosis ICD-10-CM: D72.829  ICD-9-CM: 288.60  8/30/2017 Yes        Anemia ICD-10-CM: D64.9  ICD-9-CM: 285.9  8/30/2017 Yes        Hypokalemia ICD-10-CM: E87.6  ICD-9-CM: 276.8  8/30/2017 Unknown        * (Principal)Pseudo-obstruction of colon ICD-10-CM: K59.8  ICD-9-CM: 564.89  8/30/2017 Unknown        Elevated liver enzymes ICD-10-CM: R74.8  ICD-9-CM: 790.5  8/30/2017 Yes        Ileus ([address] ICD-10-CM: K56.7  ICD-9-CM: 560.1  8/29/2017 Unknown        Generalized abdominal pain ICD-10-CM: R10.84  ICD-9-CM: 789.07  8/29/2017 Unknown                Review of Systems:   A comprehensive review of systems was negative except for that written in the HPI. Vital Signs:    Last 24hrs VS reviewed since prior progress note.  Most recent are:  Visit Vitals    /78 (BP 1 Location: Left arm, BP Patient Position: At rest)    Pulse 78    Temp 97.9 °F (36.6 °C)    Resp 16    Ht 5' 6\" (1.676 m)    Wt 69.9 kg (154 lb 1.6 oz)    SpO2 98%    BMI 24.87 kg/m2         Intake/Output Summary (Last 24 hours) at 09/01/17 0836  Last data filed at 09/01/17 2487 Gross per 24 hour   Intake              240 ml   Output             1700 ml   Net            -1460 ml        Physical Examination:             Constitutional:  NAD,lying comfortably in bed. ENT:  Oral mucous moist, oropharynx benign. Neck supple,    Resp:  CTA bilaterally. No wheezing/rhonchi/rales. No accessory muscle use   CV:  Regular rhythm, normal rate, no murmurs, gallops, rubs    GI:  Soft, non distended, non tender. normoactive bowel sounds, no hepatosplenomegaly. Rectal tube with liquid brown stool     Musculoskeletal:  No edema, warm, 2+ pulses throughout    Neurologic:  Moves all extremities. AAOx3     Psych:  Good insight, Not anxious nor agitated. Data Review:    Review and/or order of clinical lab test  Review and/or order of tests in the radiology section of CPT  Review and/or order of tests in the medicine section of CPT      Labs:     Recent Labs      08/31/17 0447 08/30/17 0600   WBC  15.0*  14.7*   HGB  8.7*  8.5*   HCT  26.0*  25.2*   PLT  230  226     Recent Labs      08/31/17 0447 08/30/17 0600 08/29/17   1800   NA  137  138  134*   K  3.6  3.2*  3.1*   CL  107  106  97   CO2  20*  22  26   BUN  8  9  11   CREA  0.52*  0.57*  0.87   GLU  83  97  114*   CA  7.6*  7.7*  8.5   MG   --   2.0   --    PHOS   --   2.6   --      Recent Labs      09/01/17 0700 08/31/17 0447 08/30/17 0600 08/29/17   1800   SGOT  96*  90*  87*  109*   ALT  102*  90*  87*  112*   AP  128*  123*  115  137*   TBILI  1.5*  1.7*  1.6*  1.8*  1.8*   TP  5.2*  4.8*  5.4*  6.7   ALB  2.7*  2.5*  2.5*  2.9*   GLOB  2.5  2.3  2.9  3.8   LPSE   --   768*  723*  717*     Recent Labs      08/29/17   1800   INR  1.3*   PTP  13.7*      Recent Labs      09/01/17 0700 08/31/17 0447   TIBC  254  237*   PSAT  75*  14*   FERR  1059*   --       No results found for: FOL, RBCF   No results for input(s): PH, PCO2, PO2 in the last 72 hours. No results for input(s): CPK, CKNDX, TROIQ in the last 72 hours.     No lab exists for component: CPKMB  Lab Results   Component Value Date/Time    Cholesterol, total 105 08/30/2017 06:00 AM    HDL Cholesterol 19 08/30/2017 06:00 AM    LDL, calculated 56.4 08/30/2017 06:00 AM    Triglyceride 148 08/30/2017 06:00 AM    CHOL/HDL Ratio 5.5 08/30/2017 06:00 AM     Lab Results   Component Value Date/Time    Glucose (POC) 118 08/23/2017 10:35 AM     Lab Results   Component Value Date/Time    Color DARK YELLOW 08/29/2017 07:49 PM    Appearance CLOUDY 08/29/2017 07:49 PM    Specific gravity 1.022 08/29/2017 07:49 PM    pH (UA) 6.0 08/29/2017 07:49 PM    Protein NEGATIVE  08/29/2017 07:49 PM    Glucose NEGATIVE  08/29/2017 07:49 PM    Ketone NEGATIVE  08/29/2017 07:49 PM    Bilirubin NEGATIVE  08/16/2017 10:30 AM    Urobilinogen 2.0 08/29/2017 07:49 PM    Nitrites NEGATIVE  08/29/2017 07:49 PM    Leukocyte Esterase TRACE 08/29/2017 07:49 PM    Epithelial cells FEW 08/29/2017 07:49 PM    Bacteria NEGATIVE  08/29/2017 07:49 PM    WBC 0-4 08/29/2017 07:49 PM    RBC 0-5 08/29/2017 07:49 PM         Medications Reviewed:     Current Facility-Administered Medications   Medication Dose Route Frequency    balsam peru-castor oil (VENELEX)  mg/gram ointment   Topical BID    sodium chloride (NS) flush 5-10 mL  5-10 mL IntraVENous Q8H    sodium chloride (NS) flush 5-10 mL  5-10 mL IntraVENous PRN    ondansetron (ZOFRAN) injection 4 mg  4 mg IntraVENous Q6H PRN    piperacillin-tazobactam (ZOSYN) 3.375 g in 0.9% sodium chloride (MBP/ADV) 100 mL  3.375 g IntraVENous Q8H    atenolol (TENORMIN) tablet 50 mg  50 mg Oral DAILY    amLODIPine (NORVASC) tablet 5 mg  5 mg Oral DAILY    0.9% sodium chloride with KCl 40 mEq/L infusion   IntraVENous CONTINUOUS    metoclopramide HCl (REGLAN) injection 10 mg  10 mg IntraVENous Q6H     ______________________________________________________________________  EXPECTED LENGTH OF STAY: 3d 19h  ACTUAL LENGTH OF STAY:          3                 Haseeb Sterling MD

## 2017-09-01 NOTE — PROGRESS NOTES
Physical Therapy  9.1.17    Chart reviewed. Encountered patient ambulating in the hallway with family support managing lines. Patient using RW with flexed posture and brace on. Patient with urgency to urinate and ambulated into the bathroom of his room (noted urine down the hallway which was cleaned up by PT). Patient then assisted to chair with PT SBA and RW, given washcloths for cleaning up and assisted with gown change. Left with family assisting to clean patient's LEs after episode of incontinence. Patient reported he had been using log roll technique for bed mobility. F/u Monday for PT. Encouraged patient to continue mobility over the weekend with RN assist for line management and RW and OOB to chair 3x/day. Thank you.     Kelby Escobar, PT, DPT  Time spent with patient: 10 minutes

## 2017-09-01 NOTE — WOUND CARE
Wound Consult: Follow Up Visit. Chart reviewed. Spoke with patients nurse,  Noa Morning to hand off on visit with patient this a.m. Patient is resting on a Sravani bed with pressure redistribution mattress. Patient alert, oriented, turns to side independently for me to assess. Assessment:  Left heel - 4 x 3.5 are of non-blanching violet discoloration of intact skin, no surrounding redness; DTI POA. Heel is floated. Buttocks/sacrum - no breakdown, no discoloration at sacrum or buttocks. Gluteal cleft - pink to red, blanching; has FMS in place for liquid stool; barrier ointment in use. Right heel is without redness or discoloration. Recommendations:  1. Minimize layers of linen/pads under patient to optimize support surface. 2. Reposition: continue to turn and reposition approximately every 2 hours; use Prevalon boots. 3. Manage incontinence - currently with FMS; use barrier ointment to protect skin. 4. Continue to monitor nutrition, pain, and skin risk scale, and skin assessment. 5. Begin Venelex to left heel. Plan: We will continue to reassess routinely and as needed.   Zoraida Nissen, [address] [facility] Office [phone]  Pager [phone]

## 2017-09-01 NOTE — PROGRESS NOTES
[address]  [address] [place], [address]  [phone]                GI PROGRESS NOTE      NAME:   Narciso Olguin   :    1940   MRN:    [id number]     Assessment/Plan   1. Abdominal distention - likely related to post-op pseudo-obstruction in setting of recent spinal surgery and narcotic use. Rectal tube placed-improving distension. Start GI lite diet today and if symptoms continue to improve, remove rectal tube in AM   -Monitor stool output  2. Elevated liver enzymes - with history of daily ETOH use and hepatic steatosis. CT scan with probable cirrhosis and splenomegaly.   - Hepatology consult pending  3. Anemia - in setting of recent surgery. Hgb 8.7 this AM. He is without any evidence of overt bleeding.   - Occult stool pending  - Monitor H&H and follow clinical course for any signs of bleeding  4. Leukocytosis - WBC trending up  - Cultures with NGTD   - Agree with checking C. Diff  - Continue antibiotics   5. 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  male who is feeling better. Distension is improved. He is hungry and wants 'real food'.       Review of Systems    Constitutional: negative fever, negative chills, negative weight loss  Eyes:   negative visual changes  ENT:   negative sore throat, tongue or lip swelling  Respiratory:  negative cough, negative dyspnea  Cards:  negative for chest pain, palpitations, lower extremity edema  GI:   See HPI  :  negative for frequency, dysuria  Integument:  negative for rash and pruritus  Heme:  negative for easy bruising and gum/nose bleeding  Musculoskel: negative for myalgias,  back pain and muscle weakness  Neuro: negative for headaches, dizziness, vertigo  Psych:  negative for feelings of anxiety, depression           Objective:     VITALS:   Last 24hrs VS reviewed since prior hospitalist progress note. Most recent are:  Visit Vitals    /65    Pulse 74    Temp 98.4 °F (36.9 °C)    Resp 16    Ht 5' 6\" (1.676 m)    Wt 69.9 kg (154 lb 1.6 oz)    SpO2 98%    BMI 24.87 kg/m2       Intake/Output Summary (Last 24 hours) at 09/01/17 1821  Last data filed at 09/01/17 0753   Gross per 24 hour   Intake              240 ml   Output             1100 ml   Net             -860 ml        PHYSICAL EXAM:  General   well developed, well nourished, appears stated age, in no acute distress  EENT  Normocephalic, Atraumatic, PERRLA, EOMI, sclera clear, nares clear, pharynx normal  Neck   Supple without nodes or mass. No thyromegaly or bruit  Respiratory   Clear To Auscultation bilaterally - no wheezes, rales, rhonchi, or crackles  Cardiology  Regular Rate and Rythmn  - no murmurs, rubs or gallops  Abdominal  Soft, non-tender, mildly-distended, positive bowel sounds, no hepatosplenomegaly, no palpable mass  Extremities  No clubbing, cyanosis, or edema. Pulses intact. Back  No spinal or muscle pain. No CVAT. Skin  Normal skin turgor. No rashes or skin ulcers noted  Neurological  No focal neurological deficits noted  Psychological  Oriented x 3. Normal affect. Lab Data   Recent Results (from the past 12 hour(s))   HEP B SURFACE AB    Collection Time: 09/01/17  7:00 AM   Result Value Ref Range    Hepatitis B surface Ab <3.10 mIU/mL    Hep B surface Ab Interp. NONREACTIVE     HEPATITIS C AB    Collection Time: 09/01/17  7:00 AM   Result Value Ref Range    Hep C  virus Ab Interp.  NONREACTIVE NR      Hep C  virus Ab comment Method used is [employer]     HEP B SURFACE AG    Collection Time: 09/01/17  7:00 AM Result Value Ref Range    Hepatitis B surface Ag <0.10 Index    Hep B surface Ag Interp. NEGATIVE  NEG     HEPATIC FUNCTION PANEL    Collection Time: 09/01/17  7:00 AM   Result Value Ref Range    Protein, total 5.2 (L) 6.4 - 8.2 g/dL    Albumin 2.7 (L) 3.5 - 5.0 g/dL    Globulin 2.5 2.0 - 4.0 g/dL    A-G Ratio 1.1 1.1 - 2.2      Bilirubin, total 1.5 (H) 0.2 - 1.0 MG/DL    Bilirubin, direct 0.8 (H) 0.0 - 0.2 MG/DL    Alk. phosphatase 128 (H) 45 - 117 U/L    AST (SGOT) 96 (H) 15 - 37 U/L    ALT (SGPT) 102 (H) 12 - 78 U/L   FERRITIN    Collection Time: 09/01/17  7:00 AM   Result Value Ref Range    Ferritin 1059 (H) 26 - 388 NG/ML   IRON PROFILE    Collection Time: 09/01/17  7:00 AM   Result Value Ref Range    Iron 191 (H) 35 - 150 ug/dL    TIBC 254 250 - 450 ug/dL    Iron % saturation 75 (H) 20 - 50 %   CBC WITH AUTOMATED DIFF    Collection Time: 09/01/17  8:58 AM   Result Value Ref Range    WBC 13.1 (H) 4.1 - 11.1 K/uL    RBC 2.81 (L) 4.10 - 5.70 M/uL    HGB 9.1 (L) 12.1 - 17.0 g/dL    HCT 27.3 (L) 36.6 - 50.3 %    MCV 97.2 80.0 - 99.0 FL    MCH 32.4 26.0 - 34.0 PG    MCHC 33.3 30.0 - 36.5 g/dL    RDW 14.2 11.5 - 14.5 %    PLATELET 797 433 - 419 K/uL    NEUTROPHILS 77 (H) 32 - 75 %    BAND NEUTROPHILS 1 %    LYMPHOCYTES 7 (L) 12 - 49 %    MONOCYTES 11 5 - 13 %    EOSINOPHILS 2 0 - 7 %    BASOPHILS 0 0 - 1 %    METAMYELOCYTES 1 %    MYELOCYTES 1 %    ABS. NEUTROPHILS 10.2 (H) 1.8 - 8.0 K/UL    ABS. LYMPHOCYTES 0.9 0.8 - 3.5 K/UL    ABS. MONOCYTES 1.4 (H) 0.0 - 1.0 K/UL    ABS. EOSINOPHILS 0.3 0.0 - 0.4 K/UL    ABS.  BASOPHILS 0.0 0.0 - 0.1 K/UL    DF MANUAL      RBC COMMENTS ANISOCYTOSIS  1+        RBC COMMENTS POLYCHROMASIA  PRESENT        WBC COMMENTS HYPERSEGMENTED POLYS     METABOLIC PANEL, BASIC    Collection Time: 09/01/17  8:58 AM   Result Value Ref Range    Sodium 136 136 - 145 mmol/L    Potassium 3.4 (L) 3.5 - 5.1 mmol/L    Chloride 104 97 - 108 mmol/L    CO2 21 21 - 32 mmol/L    Anion gap 11 5 - 15 mmol/L Glucose 86 65 - 100 mg/dL    BUN 4 (L) 6 - 20 MG/DL    Creatinine 0.51 (L) 0.70 - 1.30 MG/DL    BUN/Creatinine ratio 8 (L) 12 - 20      GFR est AA >60 >60 ml/min/1.73m2    GFR est non-AA >60 >60 ml/min/1.73m2    Calcium 7.7 (L) 8.5 - 10.1 MG/DL         Medications: Reviewed    PMH/ reviewed - no change compared to H&P  Attending Physician: Aquilino Fox MD   Date/Time:  9/1/2017

## 2017-09-01 NOTE — PROGRESS NOTES
Bedside shift change report given to [address] [place] [address] Avenue (oncoming nurse) by Liborio Jacinto (offgoing nurse). Report included the following information SBAR, Kardex, Intake/Output, MAR and Recent Results.

## 2017-09-02 LAB
A1AT SERPL-MCNC: 235 MG/DL (ref 90–200)
HAV AB SER QL IA: POSITIVE
HBV CORE AB SERPL QL IA: NEGATIVE

## 2017-09-02 PROCEDURE — 74011250636 HC RX REV CODE- 250/636: Performed by: SURGERY

## 2017-09-02 PROCEDURE — 74011250636 HC RX REV CODE- 250/636: Performed by: NURSE PRACTITIONER

## 2017-09-02 PROCEDURE — 65270000032 HC RM SEMIPRIVATE

## 2017-09-02 PROCEDURE — 74011250636 HC RX REV CODE- 250/636: Performed by: FAMILY MEDICINE

## 2017-09-02 PROCEDURE — 74011000258 HC RX REV CODE- 258: Performed by: FAMILY MEDICINE

## 2017-09-02 PROCEDURE — 74011250637 HC RX REV CODE- 250/637: Performed by: HOSPITALIST

## 2017-09-02 RX ADMIN — Medication 10 ML: at 21:25

## 2017-09-02 RX ADMIN — Medication 10 ML: at 13:34

## 2017-09-02 RX ADMIN — Medication 10 ML: at 05:06

## 2017-09-02 RX ADMIN — METOCLOPRAMIDE 10 MG: 5 INJECTION, SOLUTION INTRAMUSCULAR; INTRAVENOUS at 05:57

## 2017-09-02 RX ADMIN — ATENOLOL 50 MG: 50 TABLET ORAL at 08:25

## 2017-09-02 RX ADMIN — SODIUM CHLORIDE AND POTASSIUM CHLORIDE: 9; 2.98 INJECTION, SOLUTION INTRAVENOUS at 05:56

## 2017-09-02 RX ADMIN — PIPERACILLIN SODIUM,TAZOBACTAM SODIUM 3.38 G: 3; .375 INJECTION, POWDER, FOR SOLUTION INTRAVENOUS at 13:34

## 2017-09-02 RX ADMIN — METOCLOPRAMIDE 10 MG: 5 INJECTION, SOLUTION INTRAMUSCULAR; INTRAVENOUS at 12:09

## 2017-09-02 RX ADMIN — CASTOR OIL AND BALSAM, PERU: 788; 87 OINTMENT TOPICAL at 08:26

## 2017-09-02 RX ADMIN — PIPERACILLIN SODIUM,TAZOBACTAM SODIUM 3.38 G: 3; .375 INJECTION, POWDER, FOR SOLUTION INTRAVENOUS at 05:57

## 2017-09-02 RX ADMIN — PIPERACILLIN SODIUM,TAZOBACTAM SODIUM 3.38 G: 3; .375 INJECTION, POWDER, FOR SOLUTION INTRAVENOUS at 21:24

## 2017-09-02 RX ADMIN — AMLODIPINE BESYLATE 5 MG: 5 TABLET ORAL at 08:25

## 2017-09-02 RX ADMIN — METOCLOPRAMIDE 10 MG: 5 INJECTION, SOLUTION INTRAMUSCULAR; INTRAVENOUS at 17:05

## 2017-09-02 RX ADMIN — CASTOR OIL AND BALSAM, PERU: 788; 87 OINTMENT TOPICAL at 17:05

## 2017-09-02 NOTE — PROGRESS NOTES
[address] Latrell Jeffery MD, Bere Moore, [facility] [place]        April ROSALINE Rodriguez PA-C Iverson Duff, [facility] Services[facility]I-BC   ROSALINE Sweet NP         at [address], [address]     [phone]     FAX: [phone]    at [facility]     [address], [address]     [place], [address] [place] [address] Street - [address]     [phone]     FAX: [phone]         HEPATOLOGY PROGRESS NOTE  The patient is a 78year old  male who was told that he had an enlarged liver 40 years ago. He says he has never seen a GI specialist for the liver and does not know if he ever had serologic testing screen for various causes of liver disease. He have screened him for various causes of chronic liver disease. He is negative for HBV, HCV, A1AT and FE. Will do due any further imaging or testing on liver now. We will see him in office after DC for Fibroscan and this will tell us if he has cirrhosis or not.     PHYSICAL EXAMINATION:  VS: per nursing note  General:  No acute distress. Eyes:  Sclera anicteric. ENT:  No oral lesions. Thyroid normal.  Nodes:  No adenopathy. Skin:  No spider angiomata. No jaundice. Respiratory:  Lungs clear to auscultation. Cardiovascular:  Regular heart rate. Mildy tender. No rebound. No obvious ascites. Extremities:  No lower extremity edema. Neurologic:  Alert and oriented. Cranial nerves grossly intact.   No asterixis.     Chioma Rodas MD  [facility] [facility] [address]  [place]  [address]  [phone]

## 2017-09-02 NOTE — PROGRESS NOTES
GI PROGRESS NOTE    NAME:             Nimesh Cardenas   :              1940   MRN:              [id number]   Admit Date:     2017  Todays Date:  2017      Subjective:          Abdominal pain: significantly improved, decreased bloating  Tolerating solids      Review of Systems - Respiratory ROS: no cough, shortness of breath, or wheezing  Cardiovascular ROS: no chest pain or dyspnea on exertion  Medications-reviewed     Current Facility-Administered Medications   Medication Dose Route Frequency    balsam peru-castor oil (VENELEX)  mg/gram ointment   Topical BID    sodium chloride (NS) flush 5-10 mL  5-10 mL IntraVENous Q8H    sodium chloride (NS) flush 5-10 mL  5-10 mL IntraVENous PRN    ondansetron (ZOFRAN) injection 4 mg  4 mg IntraVENous Q6H PRN    piperacillin-tazobactam (ZOSYN) 3.375 g in 0.9% sodium chloride (MBP/ADV) 100 mL  3.375 g IntraVENous Q8H    atenolol (TENORMIN) tablet 50 mg  50 mg Oral DAILY    amLODIPine (NORVASC) tablet 5 mg  5 mg Oral DAILY    0.9% sodium chloride with KCl 40 mEq/L infusion   IntraVENous CONTINUOUS    metoclopramide HCl (REGLAN) injection 10 mg  10 mg IntraVENous Q6H        Objective:   Patient Vitals for the past 8 hrs:   BP Temp Pulse Resp SpO2   17 0809 132/69 98.7 °F (37.1 °C) 86 16 98 %   17 0600 115/63 98.5 °F (36.9 °C) 75 16 99 %      0701 -  1900  In: 240 [P.O.:240]  Out: 1650 [Urine:1100; Drains:550]  1901 -  0700  In: 240 [P.O.:240]  Out: 1100 [Urine:1100]    EXAM:    Visit Vitals    /69 (BP 1 Location: Right arm, BP Patient Position: At rest;Lying left side)    Pulse 86    Temp 98.7 °F (37.1 °C)    Resp 16    Ht 5' 6\" (1.676 m)    Wt 69.9 kg (154 lb 1.6 oz)    SpO2 98%    BMI 24.87 kg/m2     Alert and Oriented, x3, in no acute distress.   Cardiovascular: RRR, no peripheral edema  Abdomen:soft, obese, nontender and minimal tympany w/ +BS    Data Review     Recent Labs      17 4899  08/31/17   0447   WBC  13.1*  15.0*   HGB  9.1*  8.7*   HCT  27.3*  26.0*   PLT  206  230     Recent Labs      09/01/17   0858  08/31/17   0447   NA  136  137   K  3.4*  3.6   CL  104  107   CO2  21  20*   BUN  4*  8   CREA  0.51*  0.52*   GLU  86  83   CA  7.7*  7.6*     Recent Labs      09/01/17   0700  08/31/17   0447   SGOT  96*  90*   AP  128*  123*   TP  5.2*  4.8*   ALB  2.7*  2.5*   GLOB  2.5  2.3   LPSE   --   768*                              Assessment:   1. Abdominal distention - likely related to post-op pseudo-obstruction in setting of recent spinal surgery and narcotic use. Now mostly resolved DC rectal tube  2. Elevated liver enzymes - with history of daily ETOH use and hepatic steatosis. CT scan with probable cirrhosis and splenomegaly.   - Hepatology consult - possible cirrhosis fu as outpatient  3. Anemia - in setting of recent surgery. Hgb 8.7 this AM. He is without any evidence of overt bleeding.   - Occult stool (-)  - Monitor H&H and follow clinical course for any signs of bleeding  4. Leukocytosis - WBC trending up  - Cultures with NGTD   - C. Diff (-)  - Continue antibiotics   5. Elevated lipase - likely related to a combination of the above as no evidence of pancreatitis on CT scan, no sx's so follow          Principal Problem:    Pseudo-obstruction of colon (8/30/2017)    Active Problems:    Ileus ([address] (8/29/2017)      Generalized abdominal pain (8/29/2017)      Leukocytosis (8/30/2017)      Anemia (8/30/2017)      Hypokalemia (8/30/2017)      Elevated liver enzymes (8/30/2017)        Plan:   1.  As above         Emily Harrington MD

## 2017-09-02 NOTE — PROGRESS NOTES
General Surgery Daily Progress Note    Admit Date: 2017  Post-Operative Day: * No surgery found * from * No surgery found *     Subjective:     Last 24 hrs: Pt feels better. Happy to have diet advanced. Still has fecal mgmt drain w/ stool present       Objective:     Blood pressure 132/69, pulse 86, temperature 98.7 °F (37.1 °C), resp. rate 16, height 5' 6\" (1.676 m), weight 154 lb 1.6 oz (69.9 kg), SpO2 98 %. Temp (24hrs), Av.5 °F (36.9 °C), Min:98.4 °F (36.9 °C), Max:98.7 °F (37.1 °C)      _____________________  Physical Exam:     Alert and Oriented, x3, in no acute distress.   Cardiovascular: RRR, no peripheral edema  Abdomen: somewhat distended w/ +BS      Assessment:   Principal Problem:    Pseudo-obstruction of colon (2017)    Active Problems:    Ileus ([address] (2017)      Generalized abdominal pain (2017)      Leukocytosis (2017)      Anemia (2017)      Hypokalemia (2017)      Elevated liver enzymes (2017)            Plan:     Cont gi lite diet  Rectal drain out per gi  No surgical indications at this time  Following      Data Review:    Recent Labs      17   0858  17   WBC  13.1*  15.0*   HGB  9.1*  8.7*   HCT  27.3*  26.0*   PLT  206  230     Recent Labs      17   0858  17   0700  17   NA  136   --   137   K  3.4*   --   3.6   CL  104   --   107   CO2  21   --   20*   GLU  86   --   83   BUN  4*   --   8   CREA  0.51*   --   0.52*   CA  7.7*   --   7.6*   ALB   --   2.7*  2.5*   SGOT   --   96*  90*   ALT   --   102*  90*     Recent Labs      177   LPSE  768*           ______________________  Medications:    Current Facility-Administered Medications   Medication Dose Route Frequency    balsam peru-castor oil (VENELEX)  mg/gram ointment   Topical BID    sodium chloride (NS) flush 5-10 mL  5-10 mL IntraVENous Q8H    sodium chloride (NS) flush 5-10 mL  5-10 mL IntraVENous PRN    ondansetron (ZOFRAN) injection 4 mg  4 mg IntraVENous Q6H PRN    piperacillin-tazobactam (ZOSYN) 3.375 g in 0.9% sodium chloride (MBP/ADV) 100 mL  3.375 g IntraVENous Q8H    atenolol (TENORMIN) tablet 50 mg  50 mg Oral DAILY    amLODIPine (NORVASC) tablet 5 mg  5 mg Oral DAILY    0.9% sodium chloride with KCl 40 mEq/L infusion   IntraVENous CONTINUOUS    metoclopramide HCl (REGLAN) injection 10 mg  10 mg IntraVENous Q6H       Stephan Perez NP  9/2/2017      Pt seen and Examined  Doing well on gi lite Diet  Abd- S/nt/nd   Continue diet as tolerated  Will sign off   Call with any questions.

## 2017-09-02 NOTE — PROGRESS NOTES
Hospitalist Progress Note  Jackie Diaz MD  Office: [phone]        Date of Service:  2017  NAME:  Jeannette Shell  :  1940  MRN:  [id number]      Admission Summary:   78 yom with pmh of arthritis, coronary artery disease s/p PTCA stent, fatty liver, CHF, degenerative joint disease, HTN, MI, scoliosis, spinal stenosis, presented to the ED from home accompanied by his wife and   daughter with cc of feeling tired, fatigued, lethargic. Pt is a limited historian in this regard. His wife notes that she felt like the pt was more confused, lethargic, looking more fatigue which has been ongoing since he had his surgery. Pt reportedly had L2-L3, L3-L4, L4-L5 laminectomy, decompression and posterior lateral fusion of L1 to L5 on 2017. According to the wife, home health nurse visited the patient yesterday, collected some lab work, told that the pt was anemic with a decreased hgb and elevated WBC. She notes they were   advised to go to the hospital. Pt has been more fatigued, looking lethargic, confused. Pt reportedly had been prescribed oxycodone, which according to reports, made him have hallucinations. Reports pt has not had much narcotics and has been taking Tylenol for pain. PROCEDURES :   1. Laminectomy and decompression with medial facetectomies and   foraminotomies at L4-L5.   2. Laminectomy and decompression with medial facetectomies and   foraminotomies at L3-L4. 3. Decompression and laminectomy with medial facetectomies and   foraminotomies at L2-L3. 4. Posterolateral fusion, L1 to L5.   5. Placement of segmental instrumentation using [facility]   pedicle screws from L1 to L5, 5.5 and 6.5 mm in thickness. 6. Transforaminal lumbar interbody fusion, L4-L5. 7. Placement of interbody machine cage, 8 mm Radha cage. 8. Iliac crest bone graft, left hip. 9. Bone marrow aspirate, right hip.    10. Use of local autograft bone. 11. Use of eLamaor robotics to place pedicle screws. Interval history / Subjective:   Patient resting in bed. Feels better this afternoon. The rectal tube was removed. Patient was evaluated by Ortho: incision clean and dry, dry sterile dressing applied 9/02. Assessment & Plan:     Possible Pseudo-Obstruction s/p recent surgery  - CT abd imaging review by Dr Patsy Claudio with GI who believes colon wall thickening likely from pseudo-obstruction not colitis  -only having loose stools via rectal tube  -will continue to follow GI recommendations. - 9/02: rectal tube removed per GI. Surgery signed off. Continue current diet (GI lite), ambulate, add prune juice. Diarrhea   -likely from obstruction  -plan as above  -C. Diff negative    Elevated Lipase  -unlikely pancreatitis  -lipase only mildly elevated and asymptomatic  -CT abd showing normal pancreas     Liver Cirrhosis  -seen on CT with elevated liver enzymes  -reports drinking at least 2 drinks per day  -liver serology results pending  -Hepatology consult appreciated: Imaging consistent with liver cirrhosis, Total Bili elevated, Albumin low, PLT normal, serologic testing sent. Outpatient f/u with Dr. Corey Bravo a few weeks after discharge to  discuss the results of testing and do a fibroscan to confirm cirrhosis. Leukocytosis: slowly improving;  -unclear etiology, possibly reactive from pseudo-obstruction vs possible infectious process   -afebrile  -continue IV Zosyn for now   - blood cultures NGTD  -back aspirate cx negative  -Pt afebrile. Continue current Abx, but no definite source of infection, possibly enteritis. Anemia  -multifactorial including chronic disease and recent surgery.   -pt had iv iron yesterday and today's iron study improved compared to previous.  - PO iron supplements when patient starts to have regular BMs;     Hypokalemia   -replenished  -still on lower side of normal, with diarrhea, continue with K in IVF  -monitor    HTN   - well controlled, monitor    Left heel DTI, POA:  - ~ 4 x 4 cm violet non-blanching discoloration of heel with blister forming; no surrounding redness. - appreciate wound care team consult and recommendations; reposition every 2h and use Prevalon boots; Code status: Full  DVT prophylaxis: SCDs    Care Plan discussed with: Patient/Family and Nurse, wife at bedside. Disposition: Home w/Family, anticipate tomorrow if patient is eating well and has a BM; Resume HH for PT, OT, RN as prior to admission. Hospital Problems  Date Reviewed: 8/30/2017          Codes Class Noted POA    Leukocytosis ICD-10-CM: D72.829  ICD-9-CM: 288.60  8/30/2017 Yes        Anemia ICD-10-CM: D64.9  ICD-9-CM: 285.9  8/30/2017 Yes        Hypokalemia ICD-10-CM: E87.6  ICD-9-CM: 276.8  8/30/2017 Unknown        * (Principal)Pseudo-obstruction of colon ICD-10-CM: K59.8  ICD-9-CM: 564.89  8/30/2017 Unknown        Elevated liver enzymes ICD-10-CM: R74.8  ICD-9-CM: 790.5  8/30/2017 Yes        Ileus ([address] ICD-10-CM: K56.7  ICD-9-CM: 560.1  8/29/2017 Unknown        Generalized abdominal pain ICD-10-CM: R10.84  ICD-9-CM: 789.07  8/29/2017 Unknown                Review of Systems:   A comprehensive review of systems was negative except for that written in the HPI. Vital Signs:    Last 24hrs VS reviewed since prior progress note. Most recent are:  Visit Vitals    /69 (BP 1 Location: Right arm, BP Patient Position: At rest;Lying left side)    Pulse 86    Temp 98.7 °F (37.1 °C)    Resp 16    Ht 5' 6\" (1.676 m)    Wt 69.9 kg (154 lb 1.6 oz)    SpO2 98%    BMI 24.87 kg/m2         Intake/Output Summary (Last 24 hours) at 09/02/17 0850  Last data filed at 09/02/17 0829   Gross per 24 hour   Intake                0 ml   Output              800 ml   Net             -800 ml        Physical Examination:             Constitutional:  NAD,lying comfortably in bed. ENT:  Oral mucous moist, oropharynx benign. Neck supple,    Resp:  CTA bilaterally. No wheezing/rhonchi/rales. No accessory muscle use   CV:  Regular rhythm, normal rate, no murmurs, gallops, rubs    GI:  Soft, non distended, non tender. normoactive bowel sounds, no hepatosplenomegaly. Rectal tube with liquid brown stool     Musculoskeletal:  No edema, warm, 2+ pulses throughout    Neurologic:  Moves all extremities. AAOx3     Psych:  Good insight, Not anxious nor agitated. Data Review:    Review and/or order of clinical lab test  Review and/or order of tests in the radiology section of CPT  Review and/or order of tests in the medicine section of CPT      Labs:     Recent Labs      09/01/17   0858  08/31/17 0447   WBC  13.1*  15.0*   HGB  9.1*  8.7*   HCT  27.3*  26.0*   PLT  206  230     Recent Labs      09/01/17   0858  08/31/17 0447   NA  136  137   K  3.4*  3.6   CL  104  107   CO2  21  20*   BUN  4*  8   CREA  0.51*  0.52*   GLU  86  83   CA  7.7*  7.6*     Recent Labs      09/01/17   0700 08/31/17 0447   SGOT  96*  90*   ALT  102*  90*   AP  128*  123*   TBILI  1.5*  1.7*   TP  5.2*  4.8*   ALB  2.7*  2.5*   GLOB  2.5  2.3   LPSE   --   768*     No results for input(s): INR, PTP, APTT in the last 72 hours. No lab exists for component: INREXT, INREXT   Recent Labs      09/01/17   0700 08/31/17 0447   TIBC  254  237*   PSAT  75*  14*   FERR  1059*   --       No results found for: FOL, RBCF   No results for input(s): PH, PCO2, PO2 in the last 72 hours. No results for input(s): CPK, CKNDX, TROIQ in the last 72 hours.     No lab exists for component: CPKMB  Lab Results   Component Value Date/Time    Cholesterol, total 105 08/30/2017 06:00 AM    HDL Cholesterol 19 08/30/2017 06:00 AM    LDL, calculated 56.4 08/30/2017 06:00 AM    Triglyceride 148 08/30/2017 06:00 AM    CHOL/HDL Ratio 5.5 08/30/2017 06:00 AM     Lab Results   Component Value Date/Time    Glucose (POC) 118 08/23/2017 10:35 AM     Lab Results   Component Value Date/Time Color DARK YELLOW 08/29/2017 07:49 PM    Appearance CLOUDY 08/29/2017 07:49 PM    Specific gravity 1.022 08/29/2017 07:49 PM    pH (UA) 6.0 08/29/2017 07:49 PM    Protein NEGATIVE  08/29/2017 07:49 PM    Glucose NEGATIVE  08/29/2017 07:49 PM    Ketone NEGATIVE  08/29/2017 07:49 PM    Bilirubin NEGATIVE  08/16/2017 10:30 AM    Urobilinogen 2.0 08/29/2017 07:49 PM    Nitrites NEGATIVE  08/29/2017 07:49 PM    Leukocyte Esterase TRACE 08/29/2017 07:49 PM    Epithelial cells FEW 08/29/2017 07:49 PM    Bacteria NEGATIVE  08/29/2017 07:49 PM    WBC 0-4 08/29/2017 07:49 PM    RBC 0-5 08/29/2017 07:49 PM         Medications Reviewed:     Current Facility-Administered Medications   Medication Dose Route Frequency    balsam peru-castor oil (VENELEX)  mg/gram ointment   Topical BID    sodium chloride (NS) flush 5-10 mL  5-10 mL IntraVENous Q8H    sodium chloride (NS) flush 5-10 mL  5-10 mL IntraVENous PRN    ondansetron (ZOFRAN) injection 4 mg  4 mg IntraVENous Q6H PRN    piperacillin-tazobactam (ZOSYN) 3.375 g in 0.9% sodium chloride (MBP/ADV) 100 mL  3.375 g IntraVENous Q8H    atenolol (TENORMIN) tablet 50 mg  50 mg Oral DAILY    amLODIPine (NORVASC) tablet 5 mg  5 mg Oral DAILY    0.9% sodium chloride with KCl 40 mEq/L infusion   IntraVENous CONTINUOUS    metoclopramide HCl (REGLAN) injection 10 mg  10 mg IntraVENous Q6H     ______________________________________________________________________  EXPECTED LENGTH OF STAY: 3d 19h  ACTUAL LENGTH OF STAY:          4                 Kevin Hudson MD

## 2017-09-02 NOTE — PROGRESS NOTES
Bedside and Verbal shift change report given to [facility] RN (oncoming nurse) by Juhi Huerta (offgoing nurse). Report included the following information SBAR, Kardex, Intake/Output and Med Rec Status.

## 2017-09-02 NOTE — PROGRESS NOTES
Problem: Falls - Risk of  Goal: *Absence of Falls  Document Brianda Fall Risk and appropriate interventions in the flowsheet.    Outcome: Progressing Towards Goal  Fall Risk Interventions:  Mobility Interventions: Communicate number of staff needed for ambulation/transfer           Medication Interventions: Patient to call before getting OOB

## 2017-09-03 VITALS
RESPIRATION RATE: 16 BRPM | HEIGHT: 66 IN | BODY MASS INDEX: 24.77 KG/M2 | DIASTOLIC BLOOD PRESSURE: 80 MMHG | OXYGEN SATURATION: 98 % | SYSTOLIC BLOOD PRESSURE: 154 MMHG | WEIGHT: 154.1 LBS | HEART RATE: 75 BPM | TEMPERATURE: 98.1 F

## 2017-09-03 LAB
ALBUMIN SERPL-MCNC: 2.6 G/DL (ref 3.5–5)
ALBUMIN/GLOB SERPL: 0.8 {RATIO} (ref 1.1–2.2)
ALP SERPL-CCNC: 122 U/L (ref 45–117)
ALT SERPL-CCNC: 116 U/L (ref 12–78)
ANION GAP SERPL CALC-SCNC: 9 MMOL/L (ref 5–15)
AST SERPL-CCNC: 100 U/L (ref 15–37)
BASOPHILS # BLD: 0 K/UL (ref 0–0.1)
BASOPHILS NFR BLD: 0 % (ref 0–1)
BILIRUB SERPL-MCNC: 1.1 MG/DL (ref 0.2–1)
BUN SERPL-MCNC: 4 MG/DL (ref 6–20)
BUN/CREAT SERPL: 6 (ref 12–20)
CALCIUM SERPL-MCNC: 7.9 MG/DL (ref 8.5–10.1)
CHLORIDE SERPL-SCNC: 109 MMOL/L (ref 97–108)
CO2 SERPL-SCNC: 21 MMOL/L (ref 21–32)
CREAT SERPL-MCNC: 0.62 MG/DL (ref 0.7–1.3)
DIFFERENTIAL METHOD BLD: ABNORMAL
EOSINOPHIL # BLD: 0.5 K/UL (ref 0–0.4)
EOSINOPHIL NFR BLD: 3 % (ref 0–7)
ERYTHROCYTE [DISTWIDTH] IN BLOOD BY AUTOMATED COUNT: 14.4 % (ref 11.5–14.5)
GLOBULIN SER CALC-MCNC: 3.2 G/DL (ref 2–4)
GLUCOSE SERPL-MCNC: 107 MG/DL (ref 65–100)
HCT VFR BLD AUTO: 30.1 % (ref 36.6–50.3)
HGB BLD-MCNC: 9.9 G/DL (ref 12.1–17)
LYMPHOCYTES # BLD: 1.2 K/UL (ref 0.8–3.5)
LYMPHOCYTES NFR BLD: 8 % (ref 12–49)
MAGNESIUM SERPL-MCNC: 1.8 MG/DL (ref 1.6–2.4)
MCH RBC QN AUTO: 32.4 PG (ref 26–34)
MCHC RBC AUTO-ENTMCNC: 32.9 G/DL (ref 30–36.5)
MCV RBC AUTO: 98.4 FL (ref 80–99)
MONOCYTES # BLD: 1.4 K/UL (ref 0–1)
MONOCYTES NFR BLD: 9 % (ref 5–13)
MYELOCYTES NFR BLD MANUAL: 1 %
NEUTS BAND NFR BLD MANUAL: 1 % (ref 0–6)
NEUTS SEG # BLD: 11.9 K/UL (ref 1.8–8)
NEUTS SEG NFR BLD: 78 % (ref 32–75)
PHOSPHATE SERPL-MCNC: 2.8 MG/DL (ref 2.6–4.7)
PLATELET # BLD AUTO: 237 K/UL (ref 150–400)
POTASSIUM SERPL-SCNC: 3.8 MMOL/L (ref 3.5–5.1)
PROT SERPL-MCNC: 5.8 G/DL (ref 6.4–8.2)
RBC # BLD AUTO: 3.06 M/UL (ref 4.1–5.7)
RBC MORPH BLD: ABNORMAL
SODIUM SERPL-SCNC: 139 MMOL/L (ref 136–145)
WBC # BLD AUTO: 15 K/UL (ref 4.1–11.1)
WBC MORPH BLD: ABNORMAL

## 2017-09-03 PROCEDURE — 74011250637 HC RX REV CODE- 250/637: Performed by: HOSPITALIST

## 2017-09-03 PROCEDURE — 83735 ASSAY OF MAGNESIUM: CPT | Performed by: INTERNAL MEDICINE

## 2017-09-03 PROCEDURE — 74011000258 HC RX REV CODE- 258: Performed by: FAMILY MEDICINE

## 2017-09-03 PROCEDURE — 85025 COMPLETE CBC W/AUTO DIFF WBC: CPT | Performed by: INTERNAL MEDICINE

## 2017-09-03 PROCEDURE — 74011250636 HC RX REV CODE- 250/636: Performed by: SURGERY

## 2017-09-03 PROCEDURE — 36415 COLL VENOUS BLD VENIPUNCTURE: CPT | Performed by: INTERNAL MEDICINE

## 2017-09-03 PROCEDURE — 80053 COMPREHEN METABOLIC PANEL: CPT | Performed by: INTERNAL MEDICINE

## 2017-09-03 PROCEDURE — 84100 ASSAY OF PHOSPHORUS: CPT | Performed by: INTERNAL MEDICINE

## 2017-09-03 PROCEDURE — 74011250636 HC RX REV CODE- 250/636: Performed by: FAMILY MEDICINE

## 2017-09-03 RX ORDER — METRONIDAZOLE 250 MG/1
250 TABLET ORAL 3 TIMES DAILY
Qty: 21 TAB | Refills: 0 | Status: SHIPPED | OUTPATIENT
Start: 2017-09-03

## 2017-09-03 RX ORDER — LEVOFLOXACIN 500 MG/1
500 TABLET, FILM COATED ORAL DAILY
Qty: 7 TAB | Refills: 0 | Status: SHIPPED | OUTPATIENT
Start: 2017-09-03

## 2017-09-03 RX ADMIN — AMLODIPINE BESYLATE 5 MG: 5 TABLET ORAL at 08:29

## 2017-09-03 RX ADMIN — PIPERACILLIN SODIUM,TAZOBACTAM SODIUM 3.38 G: 3; .375 INJECTION, POWDER, FOR SOLUTION INTRAVENOUS at 05:47

## 2017-09-03 RX ADMIN — METOCLOPRAMIDE 10 MG: 5 INJECTION, SOLUTION INTRAMUSCULAR; INTRAVENOUS at 00:14

## 2017-09-03 RX ADMIN — METOCLOPRAMIDE 10 MG: 5 INJECTION, SOLUTION INTRAMUSCULAR; INTRAVENOUS at 05:47

## 2017-09-03 RX ADMIN — ATENOLOL 50 MG: 50 TABLET ORAL at 08:29

## 2017-09-03 RX ADMIN — CASTOR OIL AND BALSAM, PERU: 788; 87 OINTMENT TOPICAL at 08:29

## 2017-09-03 RX ADMIN — Medication 10 ML: at 05:47

## 2017-09-03 NOTE — PROGRESS NOTES
Notified by bedside nurse that pt is discharging today. Confirmed that [facility] [address] is accepting for skilled services at discharge. Sent electronic communication to them to notify them of discharge.   SHERRILL Wick

## 2017-09-03 NOTE — PROGRESS NOTES
Ortho Daily Progress Note    9/3/2017  10:48 AM    POD:  * No surgery found *  S/P:  Lumbar decompression and fusion- pseudo obstruction, pancreatitis    Afebrile/VSS  WBC 15- unchanged the past several days  Doing better without complaints of nausea  Pain well controlled  Calves soft/NTTP Bilaterally  Incision OK, no drainage or dehiscence, perincisional swelling subsided  Dressing clean and dry  Moving lower extremities well. Neurocirculatory exam intact and within normal range.     Lab Results   Component Value Date/Time    HGB 9.9 09/03/2017 04:20 AM    INR 1.3 08/29/2017 06:00 PM         PLAN: Cleared for DC home by Dr. Kimmy Saravia  DVT prophylaxis: SCD's  WBAT with PT-mobilization  Pain Control  Plan to D/C today with C, follow up with PCP and Dr. Franco Vaz this week  Reviewed with family, 'chandan Oneal Ra and IVONNE Wayne

## 2017-09-03 NOTE — PROGRESS NOTES
Monserrat Thibodeaux M.D.  [phone]               GASTROENTEROLOGY PROGRESS NOTE        NAME: Teena Miller   :  1940   MRN:  [id number]       Subjective:   ***      Objective:   VITALS:   Last 24hrs VS reviewed. Most recent are:  Visit Vitals    /77 (BP 1 Location: Left arm, BP Patient Position: At rest)    Pulse 73    Temp 98.3 °F (36.8 °C)    Resp 16    Ht 5' 6\" (1.676 m)    Wt 69.9 kg (154 lb 1.6 oz)    SpO2 98%    BMI 24.87 kg/m2       Intake/Output Summary (Last 24 hours) at 17 2105  Last data filed at 17 1334   Gross per 24 hour   Intake              480 ml   Output             1650 ml   Net            -1170 ml       PHYSICAL EXAM:  General: Alert, in no acute distress    HEENT: Anicteric conjunctiva. Lungs:            CTA Bilaterally  Heart:  Normal S1, S2    Abdomen: Soft, Non distended, Non tender. Normoactive bowel sounds, no HSM,   no rebound/guarding  MSK:   Normal muscle tone  Skin:   Warm to touch  Extremities: Negative bilateral pedal edema   Psych:   Good insight. Not anxious nor agitated. Lab Data Reviewed:   Recent Labs      17   0858  17   WBC  13.1*  15.0*   HGB  9.1*  8.7*   HCT  27.3*  26.0*   PLT  206  230     Recent Labs      17   0858  17   NA  136  137   K  3.4*  3.6   CL  104  107   CO2  21  20*   BUN  4*  8   CREA  0.51*  0.52*   GLU  86  83   CA  7.7*  7.6*     Recent Labs      17   0700  17   SGOT  96*  90*   AP  128*  123*   TP  5.2*  4.8*   ALB  2.7*  2.5*   GLOB  2.5  2.3   LPSE   --   768*     No results for input(s): INR, PTP, APTT in the last 72 hours. No lab exists for component: INREXT   Recent Labs      17   0717   TIBC  254  237*   PSAT  75*  14*   FERR  1059*   --      No results for input(s): CPK, CKMB in the last 72 hours.     No lab exists for component: TOÑITO    See Electronic Medical Record for all procedure/radiology reports and details which were not copied into this note but were reviewed prior to the creation of the Plan. Assessment/Plan   1. Abdominal distention - likely related to post-op pseudo-obstruction in setting of recent spinal surgery and narcotic use. Now mostly resolved DC rectal tube  2. Elevated liver enzymes - with history of daily ETOH use and hepatic steatosis. CT scan with probable cirrhosis and splenomegaly.   - Hepatology consult - possible cirrhosis fu as outpatient  3. Anemia - in setting of recent surgery. Hgb 8.7 this AM. He is without any evidence of overt bleeding.   - Occult stool (-)  - Monitor H&H and follow clinical course for any signs of bleeding  4. Leukocytosis - WBC trending up  - Cultures with NGTD   - C. Diff (-)  - Continue antibiotics   5.  Elevated lipase - likely related to a combination of the above as no evidence of pancreatitis on CT scan, no sx's so follow      Patient Active Problem List   Diagnosis Code    Spinal stenosis of lumbar region M48.06    Scoliosis M41.9    Ileus ([address] K56.7    Generalized abdominal pain R10.84    Leukocytosis D72.829    Anemia D64.9    Hypokalemia E87.6    Pseudo-obstruction of colon K59.8    Elevated liver enzymes R74.8                 Signed by: Virgilio Garzon MD         9/2/2017  9:05 PM

## 2017-09-03 NOTE — DISCHARGE INSTRUCTIONS
Discharge Instructions       PATIENT ID: Donna Corado  MRN: [id number]   YOB: 1940    DATE OF ADMISSION: 8/29/2017  6:24 PM    DATE OF DISCHARGE: 9/3/2017    PRIMARY CARE PROVIDER: Elizabeth Bran MD     ATTENDING PHYSICIAN: Anabel Boss MD  DISCHARGING PROVIDER: Anabel Boss MD    To contact this individual call [phone] and ask the  to page. If unavailable ask to be transferred the Adult Hospitalist Department. DISCHARGE DIAGNOSES   Pseudo obstruction following lumbar laminectomy    CONSULTATIONS: IP CONSULT TO ORTHOPEDIC SURGERY  IP CONSULT TO GENERAL SURGERY  IP CONSULT TO HOSPITALIST  IP CONSULT TO GASTROENTEROLOGY  IP CONSULT TO HEPATOLOGY    PROCEDURES/SURGERIES: * No surgery found *    PENDING TEST RESULTS:   At the time of discharge the following test results are still pending: None    FOLLOW UP APPOINTMENTS:   Follow-up Information     Follow up With Details Comments Contact Info    AT HOME CARE  Please collect CMP and CBC on Thursday 9/14 or Friday 9/15 and send results to PCP for review. [address], [place] In 3 days  [address]  [phone]             ADDITIONAL CARE RECOMMENDATIONS:   Please take all your medications as prescribed    DIET: Cardiac Diet, GI lite    ACTIVITY: Activity as tolerated    WOUND CARE:   Keep your incision clean and dry. Please refer to the Surgical Discharge Instructions from your last admission. Call your Surgeon immediately if you notice any drainage from your incision or there is any surrounding edema, increased warmth or if you start running fevers. EQUIPMENT needed: As per PT/OT      DISCHARGE MEDICATIONS:   See Medication Reconciliation Form    · It is important that you take the medication exactly as they are prescribed.    · Keep your medication in the bottles provided by the pharmacist and keep a list of the medication names, dosages, and times to be taken in your wallet. · Do not take other medications without consulting your doctor. NOTIFY YOUR PHYSICIAN FOR ANY OF THE FOLLOWING:   Fever over 101 degrees for 24 hours. Chest pain, shortness of breath, fever, chills, nausea, vomiting, diarrhea, change in mentation, falling, weakness, bleeding. Severe pain or pain not relieved by medications. Or, any other signs or symptoms that you may have questions about.       DISPOSITION:   x Home With:   OT  PT x HH  RN       SNF/Inpatient Rehab/LTAC    Independent/assisted living    Hospice    Other:     CDMP Checked:   Yes IK     PROBLEM LIST Updated:  Yes IK       Signed:   Zaida Martinez MD  9/3/2017  11:57 AM

## 2017-09-03 NOTE — PROGRESS NOTES
Bedside and Verbal shift change report given to [facility] RN (oncoming nurse) by Angela Goodson (offgoing nurse). Report included the following information SBAR, Kardex, Intake/Output, Recent Results and Med Rec Status.

## 2017-09-03 NOTE — DISCHARGE SUMMARY
Discharge Summary       PATIENT ID: Shiela Goodwin  MRN: [id number]   YOB: 1940    DATE OF ADMISSION: 8/29/2017  6:24 PM    DATE OF DISCHARGE: 9/03/2017   PRIMARY CARE PROVIDER: Shannan Zhu MD     ATTENDING PHYSICIAN: Anya Wells M.D.   DISCHARGING PROVIDER: Anya Wells MD    To contact this individual call [phone] and ask the  to page. If unavailable ask to be transferred the Adult Hospitalist Department. CONSULTATIONS: IP CONSULT TO ORTHOPEDIC SURGERY  IP CONSULT TO GENERAL SURGERY  IP CONSULT TO HOSPITALIST  IP CONSULT TO GASTROENTEROLOGY  IP CONSULT TO HEPATOLOGY    PROCEDURES/SURGERIES: * No surgery found *    ADMITTING [address] [place] [address] COURSE:   78 yom with pmh of arthritis, coronary artery disease s/p PTCA stent, fatty liver, CHF, degenerative joint disease, HTN, MI, scoliosis, spinal stenosis, presented to the ED from home accompanied by his wife and   daughter with cc of feeling tired, fatigued, lethargic. Pt is a limited historian in this regard. His wife notes that she felt like the pt was more confused, lethargic, looking more fatigue which has been ongoing since he had his surgery. Pt reportedly had L2-L3, L3-L4, L4-L5 laminectomy, decompression and posterior lateral fusion of L1 to L5 on 08/23/2017. According to the wife, home health nurse visited the patient yesterday, collected some lab work, told that the pt was anemic with a decreased hgb and elevated WBC. She notes they were   advised to go to the hospital. Pt has been more fatigued, looking lethargic, confused. Pt reportedly had been prescribed oxycodone, which according to reports, made him have hallucinations. Reports pt has not had much narcotics and has been taking Tylenol for pain. PROCEDURES 8/23:   1. Laminectomy and decompression with medial facetectomies and   foraminotomies at L4-L5.   2. Laminectomy and decompression with medial facetectomies and   foraminotomies at L3-L4.    3. Decompression and laminectomy with medial facetectomies and   foraminotomies at L2-L3. 4. Posterolateral fusion, L1 to L5.   5. Placement of segmental instrumentation using [facility]   pedicle screws from L1 to L5, 5.5 and 6.5 mm in thickness. 6. Transforaminal lumbar interbody fusion, L4-L5. 7. Placement of interbody machine cage, 8 mm Radha cage. 8. Iliac crest bone graft, left hip. 9. Bone marrow aspirate, right hip. 10. Use of local autograft bone. 11. Use of [employer] robotics to place pedicle screws. HOSPITAL COURSE:    Possible Pseudo-Obstruction s/p recent surgery  - CT abd imaging review by Dr Tone Greer with GI who believes colon wall thickening likely from pseudo-obstruction not colitis  -only having loose stools via rectal tube  -will continue to follow GI recommendations. - 9/02: rectal tube removed per GI. Surgery signed off. Continue current diet (GI lite), ambulate, add prune juice.       Diarrhea   -likely from obstruction  -plan as above  -C. Diff negative     Elevated Lipase  -unlikely pancreatitis  -lipase only mildly elevated and asymptomatic  -CT abd showing normal pancreas      Liver Cirrhosis  -seen on CT with elevated liver enzymes  -reports drinking at least 2 drinks per day  -Hepatology consult appreciated: Imaging consistent with liver cirrhosis, Total Bili elevated, Albumin low, PLT normal, serologic testing sent. - HAV Ab total: positive; otherwise Hepatitis panel negative. - Alpha-1 Antitrypsin elevated at 235;  - C-reactive protein elevated; Outpatient f/u with Dr. Eva Strong a few weeks after discharge to  discuss the results of testing and do a fibroscan to confirm cirrhosis.       Leukocytosis:   -unclear etiology, possibly reactive from pseudo-obstruction vs possible infectious process   -afebrile  -continue IV Zosyn for now   - blood cultures NGTD  -back aspirate cx negative  -Pt afebrile.   Continue current Abx, but no definite source of infection, possibly enteritis. - switch to PO Abx at discharge and follow-up with PCP. Will switch to PO Levaquin and Flagyl to cover for possible GI source of infection.      Anemia  -multifactorial including chronic disease and recent surgery. -pt had iv iron yesterday and today's iron study improved compared to previous.  - PO iron supplements when patient starts to have regular BMs;      Hypokalemia   -replenished  -still on lower side of normal, with diarrhea, continue with K in IVF  -monitor     HTN   - well controlled, monitor     Left heel DTI, POA:  - ~ 4 x 4 cm violet non-blanching discoloration of heel with blister forming; no surrounding redness. - appreciate wound care team consult and recommendations; reposition every 2h and use Prevalon boots; Patient was discharged to home in stable condition. He is to resume Home Health as prior to admission. Outpatient f/u with Ortho, Dr. Maddie Salazar. DISCHARGE DIAGNOSES / PLAN:      · Possible Pseudo-Obstruction s/p recent surgery: much improved. Empirically on Levaquin and Flagyl due to persistent leukocytosis and possibly colitis. · Diarrhea: C. Diff negative, improved.    · Elevated Lipase: asymptomatic; CT abd showing normal pancreas   · Liver Cirrhosis:  Outpatient f/u with Dr. Ellis Oconnor a few weeks after discharge to  discuss the results of testing and do a fibroscan to confirm cirrhosis. · Leukocytosis:  Afebrile and no definite source of infection, possibly colitis/enteritis. Switch to PO Abx (Levaquin and Flagyl) at discharge and follow-up with PC  · Anemia:  iron supplements. · HTN: well controlled, monitor  · Left heel DTI, POA:  reposition every 2h and use Prevalon boots.            PENDING TEST RESULTS:   At the time of discharge the following test results are still pending: None    FOLLOW UP APPOINTMENTS:    Follow-up Information     Follow up With Details Comments Contact Info    AT HOME CARE  Please collect CMP and CBC on Thursday 9/14 or Friday 9/15 and send results to PCP for review. [address], [place] In 3 days  [address]  [phone]             ADDITIONAL CARE RECOMMENDATIONS:   Please take all your medications as prescribed    DIET: Cardiac Diet, GI lite    ACTIVITY: Activity as tolerated    WOUND CARE:   Keep your incision clean and dry. Please refer to the Surgical Discharge Instructions from your last admission. Call your Surgeon immediately if you notice any drainage from your incision or there is any surrounding edema, increased warmth or if you start running fevers. EQUIPMENT needed: As per PT/OT        DISCHARGE MEDICATIONS:  Current Discharge Medication List      START taking these medications    Details   levoFLOXacin (LEVAQUIN) 500 mg tablet Take 1 Tab by mouth daily. Qty: 7 Tab, Refills: 0      metroNIDAZOLE (FLAGYL) 250 mg tablet Take 1 Tab by mouth three (3) times daily. Qty: 21 Tab, Refills: 0         CONTINUE these medications which have NOT CHANGED    Details   HYDROcodone-acetaminophen (NORCO) 5-325 mg per tablet Take 1 Tab by mouth every four (4) hours as needed for Pain. Max Daily Amount: 6 Tabs. Qty: 40 Tab, Refills: 0      traMADol (ULTRAM) 50 mg tablet Take 1-2 Tabs by mouth every six (6) hours as needed. Max Daily Amount: 400 mg. Qty: 50 Tab, Refills: 0      amLODIPine (NORVASC) 5 mg tablet TAKE 1 TABLET BY MOUTH EVERY DAY  Refills: 1      atenolol (TENORMIN) 50 mg tablet Take 50 mg by mouth daily. pravastatin (PRAVACHOL) 40 mg tablet Take 40 mg by mouth nightly. NOTIFY YOUR PHYSICIAN FOR ANY OF THE FOLLOWING:   Fever over 101 degrees for 24 hours. Chest pain, shortness of breath, fever, chills, nausea, vomiting, diarrhea, change in mentation, falling, weakness, bleeding. Severe pain or pain not relieved by medications.   Or, any other signs or symptoms that you may have questions about.    DISPOSITION:   x Home With:   OT  PT x HH  RN       Long term SNF/Inpatient Rehab    Independent/assisted living    Hospice    Other:       PATIENT CONDITION AT DISCHARGE:     Functional status    Poor    x Deconditioned     Independent      Cognition   x  Lucid     Forgetful     Dementia      Catheters/lines (plus indication)    [place]racken     PICC     PEG    x None      Code status     Full code     DNR      PHYSICAL EXAMINATION AT DISCHARGE:   Refer to Progress Note 9/03/2017      CHRONIC MEDICAL DIAGNOSES:  Problem List as of 9/3/2017  Date Reviewed: 8/30/2017          Codes Class Noted - Resolved    Leukocytosis ICD-10-CM: D72.829  ICD-9-CM: 288.60  8/30/2017 - Present        Anemia ICD-10-CM: D64.9  ICD-9-CM: 285.9  8/30/2017 - Present        Hypokalemia ICD-10-CM: E87.6  ICD-9-CM: 276.8  8/30/2017 - Present        * (Principal)Pseudo-obstruction of colon ICD-10-CM: K59.8  ICD-9-CM: 564.89  8/30/2017 - Present        Elevated liver enzymes ICD-10-CM: R74.8  ICD-9-CM: 790.5  8/30/2017 - Present        Ileus ([address] ICD-10-CM: K56.7  ICD-9-CM: 560.1  8/29/2017 - Present        Generalized abdominal pain ICD-10-CM: R10.84  ICD-9-CM: 789.07  8/29/2017 - Present        Spinal stenosis of lumbar region ICD-10-CM: M48.06  ICD-9-CM: 724.02  8/23/2017 - Present        Scoliosis ICD-10-CM: M41.9  ICD-9-CM: 737.30  8/23/2017 - Present              Greater than 35 minutes were spent with the patient on counseling and coordination of care    Signed:   Misti Nam MD  9/3/2017  12:00 PM

## 2017-09-04 LAB
BACTERIA SPEC CULT: NORMAL
SERVICE CMNT-IMP: NORMAL

## 2017-09-07 LAB
BACTERIA SPEC CULT: NORMAL
GRAM STN SPEC: NORMAL
SERVICE CMNT-IMP: NORMAL

## 2020-09-08 ENCOUNTER — HOSPITAL ENCOUNTER (OUTPATIENT)
Dept: GENERAL RADIOLOGY | Age: 80
Discharge: HOME | End: 2020-09-08
Attending: FAMILY MEDICINE
Payer: MEDICARE

## 2020-09-08 DIAGNOSIS — M54.50 LUMBAGO: ICD-10-CM

## 2020-09-08 PROCEDURE — 72100 X-RAY EXAM L-S SPINE 2/3 VWS: CPT

## 2020-09-16 ENCOUNTER — HOSPITAL ENCOUNTER (OUTPATIENT)
Dept: ULTRASOUND IMAGING | Age: 80
Discharge: HOME | End: 2020-09-16
Attending: FAMILY MEDICINE
Payer: MEDICARE

## 2020-09-16 DIAGNOSIS — Z13.6 SCREENING FOR ISCHEMIC HEART DISEASE: ICD-10-CM

## 2020-09-16 PROCEDURE — 76706 US ABDL AORTA SCREEN AAA: CPT

## 2021-03-29 ENCOUNTER — TRANSCRIBE ORDER (OUTPATIENT)
Dept: SCHEDULING | Age: 81
End: 2021-03-29

## 2021-03-29 DIAGNOSIS — R41.3 POOR SHORT TERM MEMORY: Primary | ICD-10-CM

## 2021-03-31 ENCOUNTER — TRANSCRIBE ORDER (OUTPATIENT)
Dept: SCHEDULING | Age: 81
End: 2021-03-31

## 2021-03-31 DIAGNOSIS — R74.8 ELEVATED LIVER ENZYMES: Primary | ICD-10-CM

## 2021-04-01 ENCOUNTER — HOSPITAL ENCOUNTER (OUTPATIENT)
Dept: CT IMAGING | Age: 81
Discharge: HOME | End: 2021-04-01
Attending: FAMILY MEDICINE
Payer: MEDICARE

## 2021-04-01 DIAGNOSIS — R41.3 POOR SHORT TERM MEMORY: ICD-10-CM

## 2021-04-01 PROCEDURE — 70450 CT HEAD/BRAIN W/O DYE: CPT

## 2021-04-02 ENCOUNTER — HOSPITAL ENCOUNTER (OUTPATIENT)
Dept: ULTRASOUND IMAGING | Age: 81
Discharge: HOME | End: 2021-04-02
Attending: FAMILY MEDICINE
Payer: MEDICARE

## 2021-04-02 DIAGNOSIS — R74.8 ELEVATED LIVER ENZYMES: ICD-10-CM

## 2021-04-02 PROCEDURE — 76705 ECHO EXAM OF ABDOMEN: CPT

## 2021-04-22 ENCOUNTER — TELEPHONE (OUTPATIENT)
Dept: HEMATOLOGY | Age: 81
End: 2021-04-22

## 2021-04-22 NOTE — TELEPHONE ENCOUNTER
Spoke with the patients family practice this morning from Dr. Ramon Caputo office and she stated that she sent over the patients medical records to our office and that she spoke with Angel Huitron and he confirmed with her that our office did received them. She stated that she was waiting for a return call from him, so that he could call the patient to schedule his appointment and also she wanted to give him more information about the patient for Dr. Kelly Tanner. Message was passed to Angel Huitron to please return the call to Dr. Ramon Caputo office, and to call the patient to get him on Dr. Connie Lane schedule. Angel Huitron voiced his understanding.  [address]

## 2022-08-29 NOTE — PROGRESS NOTES
Ortho Daily Progress Note    9/2/2017  11:58 AM    POD:  * No surgery found *  S/P:  Lumbar instrumentation and fusion  Post op Ileus, pseudo obstruction    Afebrile/VSS  Doing well without complaints of nausea  Pain well controlled  Calves soft/NTTP Bilaterally  Incision OK, no drainage or dehiscence. Dressing clean and dry. Changed to dry sterile dressing  Still appears with perincisional edema, no drainage or erythema  Moving lower extremities well. Neurocirculatory exam intact and within normal range.   Rectal tube in place  Lab Results   Component Value Date/Time    HGB 9.1 09/01/2017 08:58 AM    INR 1.3 08/29/2017 06:00 PM         PLAN: Appears orthopaedically stable  DVT prophylaxis: SCD's  WBAT with PT-mobilization  Pain Control  Plan to D/C home cleared to do so by medicine, GI      IVONNE House Tetracycline Counseling: Patient counseled regarding possible photosensitivity and increased risk for sunburn.  Patient instructed to avoid sunlight, if possible.  When exposed to sunlight, patients should wear protective clothing, sunglasses, and sunscreen.  The patient was instructed to call the office immediately if the following severe adverse effects occur:  hearing changes, easy bruising/bleeding, severe headache, or vision changes.  The patient verbalized understanding of the proper use and possible adverse effects of tetracycline.  All of the patient's questions and concerns were addressed. Patient understands to avoid pregnancy while on therapy due to potential birth defects.

## 2023-06-29 NOTE — H&P
[address] [facility] [place] [address]   HISTORY AND PHYSICAL       Name:  Connor Powell   MR#:  [id number]   :  1940   Account #:  [de-identified]        Date of Adm:  2017       CHIEF COMPLAINT: Tired, fatigued and lethargy. HISTORY OF PRESENT ILLNESS: A 72-year-old white male with past   medical history of arthritis, coronary artery disease status post PTCA   stent, fatty liver, CHF, degenerative joint disease, hypertension,   myocardial infarction, scoliosis, spinal stenosis, presented to the   emergency department from home accompanied by his wife and   daughter with the chief complaint of feeling tired, fatigued, lethargic. The patient is a limited historian in this regard. His wife notes that she   felt like the patient was more confused, lethargic, looking more fatigue   which has been ongoing since he had his surgery. The patient   reportedly had L2-L3, L3-L4, L4-L5 laminectomy, decompression and   posterior lateral fusion of L1 to L5 on 2017. According to the wife,   home health nurse visited the patient yesterday, collected some lab   work, told that the patient was anemic with a decreased hemoglobin   count and elevated white blood cell count. She notes they were   advised to go to the hospital. She notes that as such, the patient was   brought to the hospital tonight. She notes that the patient has been   more fatigued, looking lethargic, confused. The patient reportedly had   been prescribed oxycodone, which according to reports, made him   have hallucinations. The patient's wife, who reports that the patient, as   such has not been taking any narcotics, rather taking Tylenol Extra   Strength 2 tablets every 6 hours. She mentions Tramadol, but later   retracts and notes that the patient has not been taking the same. She   also points out, the patient's abdomen which appeared distended. The   patient, however, denies having any abdominal pain.  Notes he is   having normal bowel movements and passing gas. He does not   complain of any nausea, vomiting. There are no reports of dizziness,   lightheadedness, focal weakness, new-onset numbness, paresthesias,   slurred speech, facial droop, focal weakness, headache, neck pain,   new-onset chest pain, shortness of breath, cough, congestion, fever,   chills, diarrhea, melena, dysuria, hematuria, calf pain, swelling, edema. On arrival in the emergency department, initial recorded vital signs   were blood pressure 128/77, heart rate 71, respirations 16, O2   saturation 100% on room air. Workup including labs showed elevated   lipase of 717, total bilirubin 1.8, direct bilirubin 0.8, , AST is   109. WBC of 18.9, neutrophil 77%, hemoglobin 10.4. CT abdomen and   pelvis with contrast shows gaseousness colonic distention, most likely   ileus with proximal colonic mural thickening representing nondistension   versus mild colitis with trace ascites, nodular liver, splenomegaly,   which was felt to represent liver cirrhosis and postsurgical lumbar   spine and left iliac bone changes with no abscesses or hematomas. The patient was seen in consultation by general surgeon. The patient   is now seen for admission to hospitalist service for continued   evaluation and treatment. PAST MEDICAL HISTORY:   1. Arthritis. 2. Coronary artery disease. 3. Fatty liver. 4. Heart failure per chart records. 5. Degenerative joint disease. 6. Hypertension. 7. Myocardial infarction in 2000.   8. Scoliosis. 9. Lumbar spinal stenosis. PAST SURGICAL HISTORY:   1. Status post L2-L3, L3-L4, L4-L5 laminectomy, decompression,   posterior lateral fusion of L1-L5 on 08/23/2017, by Dr. Polina Singh. 2. Percutaneous transluminal coronary angioplasty stent. 3. Bilateral cataract extraction with intraocular lens implant. 4. Inguinal hernia repair. 5. Prostatectomy with left rotator cuff repair. 6. Tonsillectomy.    7. Revision of left total hip replacement. 8. Total hip arthroplasty. 9. Prostatectomy. MEDICATIONS:   1. Amlodipine 5 mg 1 tablet p.o. daily. 2. Atenolol 50 mg p.o. daily. 3. Norco 5/325 mg 1 tablet p.o. q.4h. p.r.n.   4. Pravastatin 40 mg p.o. daily. 5. Tramadol 50 mg 1 to 2 tablets p.o. q.6h. p.r.n.   6. Tylenol Extra Strength 500 mg 2 tablets p.o. q.6h.    ALLERGIES: NO KNOWN DRUG ALLERGIES. SOCIAL HISTORY: Positive smoking cigarettes. Positive for drinking   alcohol, undisclosed amount, per past chart record, 14 shots of liquor   per week. No reports of illicit drugs. He is . FAMILY HISTORY: Coronary artery disease, status post CABG -   father. Peripheral arterial disease - mother. Alzheimer dementia -   father. Breast cancer - sister. Abdominal aortic aneurysm - sister. REVIEW OF SYSTEMS   10 systems reviewed, pertinent positives as HPI, otherwise negative. PHYSICAL EXAMINATION   VITAL SIGNS: Temperature 97.8 degrees Fahrenheit, blood pressure   127/58, heart rate 59, respiratory rate 15, O2 saturation 97% on room   air. Recorded weight of 150 pounds (56 kg). Recorded height of 5 feet   6 inches tall. GENERAL: The patient in no acute respiratory distress. PSYCHIATRIC: The patient is awake, alert, oriented x3. NEUROLOGIC: GCS of 15. Moves extremities x4. Sensation is grossly   intact without slurred speech, facial droop. Fine tremors. HEENT: Normocephalic, atraumatic. PERRLA is intact. Sclerae   anicteric. Conjunctivae clear. Nares are patent. Pharynx is clear. Tongue is midline, nonedematous. NECK: Supple, without lymphadenopathy, JVD, carotid bruits,   thyromegaly. LYMPH: Negative for cervical or supraclavicular adenopathy. RESPIRATORY: Lungs are clear to auscultation bilaterally. CARDIOVASCULAR: Heart regular rate and rhythm, normal S1, S2. A   2/6 systolic murmur.    GASTROINTESTINAL: Abdomen distended, protuberant, tender to   palpation in epigastrium, right upper quadrant. No rebound, guarding   or rigidity. No auscultated abdominal bruits. No pulsatile mass. BACK: No CVA tenderness. There is appropriate tenderness to   palpation along the midline spine with a surgical wound site, which is   intact without surrounding significant erythema, warmth, no discharge   from the wound. VASCULAR: 2+ radial, 1+ dorsalis pedis pulses, without cyanosis,   clubbing, edema. MUSCULOSKELETAL: No acute palpable bony deformity. Negative for   calf tenderness. VASCULAR: 2+ radial, 1+ dorsalis. SKIN: Warm and dry. LABORATORY DATA: Reviewed as follows. Sodium is 134, potassium   3.1, chloride 97, CO2 of 26, BUN of 11, creatinine 0.87, glucose 114,   anion gap of 11, calcium 8.5, GFR greater than 60, total bilirubin is 1.8,   direct bilirubin 0.8, total protein 6.7, albumin is 2.9, ALT of 112, AST is   109, alkaline phosphatase 137, lipase of 117, lactic acid is 1.0. C-  reactive protein 7.32. WBC 18.9, hemoglobin 10.4, hematocrit 30.9,   platelets 073, neutrophils 77%, bands 1%. Urinalysis: Leukocyte   esterase trace, nitrites negative, urobilinogen 2.0, bilirubin positive,   blood negative, ketones negative, glucose negative, protein negative,   pH 6.0, specific gravity 1.022, WBC is 0-4, RBCs 0-5, bacteria   negative. INR 1.3, PT of 13.7. CT abdomen and pelvis with IV contrast:   Results are reviewed and noted in HPI. A limited abdominal ultrasound   showed immobile cholelithiasis with no biliary obstruction or evidence   of acute cholecystitis hepatic steatosis versus nonspecific hepatic   parenchymal disease with trace ascites. IMPRESSION AND PLAN:   1. Acute pancreatitis. At this time, with notably elevated lipase. Keep   n.p.o. on IV fluids. Repeat lipase level. Monitor closely. 2. Acute colitis - involving the ascending colon. Continue with Zosyn   3.375 grams IV q.6h.   3. Ileus. Noted for CT.  However, the patient notes she has had bowel   movements and passing flatus. No evidence of obstruction. Continue   with plan of care as noted. 4. Liver cirrhosis. Noted for CT scan. Will consult with   gastroenterologist.   5. Ascites. Plan as noted. 6. Leukocytosis. Consider for intraabdominal infection, inflammation   with colitis. Consult with orthopedic service to evaluate the back   wound. Otherwise, he has no other source for infection. 7. Anemia. Repeat CBC in a.m. Check iron profile, vitamin B12, folate   levels, fecal occult blood test.   8. Acute hypokalemia. Order potassium chloride 10 mEq IV over 1   hour x3 months, repeat potassium levels post-replacement. 9. Elevated liver function tests with hyperbilirubinemia. Repeat   comprehensive metabolic panel. 10. Abdominal distention. Continue plan of care as noted, epigastric   right upper quadrant abdominal tenderness. Provide pain management   if needed. 11. Hypertension. Monitor blood pressure closely and provide   antihypertensive therapy. 12. Venous thromboembolism prophylaxis. Sequential compression   devices, bilateral lower extremities. BRENNA Betts MD      MP / CD   D:  08/30/2017   04:09   T:  08/30/2017   05:44   Job #:  [id number] stated

## 2023-07-12 ENCOUNTER — WEB ENCOUNTER (OUTPATIENT)
Dept: URBAN - METROPOLITAN AREA CLINIC 72 | Facility: CLINIC | Age: 83
End: 2023-07-12

## 2023-07-12 ENCOUNTER — OFFICE VISIT (OUTPATIENT)
Dept: URBAN - METROPOLITAN AREA CLINIC 72 | Facility: CLINIC | Age: 83
End: 2023-07-12
Payer: MEDICARE

## 2023-07-12 ENCOUNTER — LAB OUTSIDE AN ENCOUNTER (OUTPATIENT)
Dept: URBAN - METROPOLITAN AREA CLINIC 72 | Facility: CLINIC | Age: 83
End: 2023-07-12

## 2023-07-12 VITALS
WEIGHT: 143 LBS | SYSTOLIC BLOOD PRESSURE: 132 MMHG | HEIGHT: 65 IN | DIASTOLIC BLOOD PRESSURE: 71 MMHG | HEART RATE: 70 BPM | BODY MASS INDEX: 23.82 KG/M2 | TEMPERATURE: 96.4 F | RESPIRATION RATE: 16 BRPM

## 2023-07-12 DIAGNOSIS — I85.10 SECONDARY ESOPHAGEAL VARICES WITHOUT BLEEDING: ICD-10-CM

## 2023-07-12 DIAGNOSIS — K92.1 MELENA: ICD-10-CM

## 2023-07-12 DIAGNOSIS — K70.30 ALCOHOLIC CIRRHOSIS OF LIVER WITHOUT ASCITES: ICD-10-CM

## 2023-07-12 PROBLEM — 14223005: Status: ACTIVE | Noted: 2023-07-12

## 2023-07-12 PROBLEM — 420054005: Status: ACTIVE | Noted: 2023-07-12

## 2023-07-12 PROCEDURE — 99204 OFFICE O/P NEW MOD 45 MIN: CPT | Performed by: NURSE PRACTITIONER

## 2023-07-12 RX ORDER — LOSARTAN POTASSIUM 50 MG/1
TABLET, FILM COATED ORAL
Qty: 90 TABLET | Status: ACTIVE | COMMUNITY

## 2023-07-12 RX ORDER — NIFEDIPINE 30 MG/1
TABLET, EXTENDED RELEASE ORAL
Qty: 90 TABLET | Status: ACTIVE | COMMUNITY

## 2023-07-12 RX ORDER — PRAVASTATIN SODIUM 40 MG/1
TABLET ORAL
Qty: 90 TABLET | Status: ACTIVE | COMMUNITY

## 2023-07-12 RX ORDER — PANTOPRAZOLE SODIUM 40 MG/1
TABLET, DELAYED RELEASE ORAL
Qty: 30 TABLET | Status: ACTIVE | COMMUNITY

## 2023-07-12 RX ORDER — NADOLOL 20 MG/1
TABLET ORAL
Qty: 90 TABLET | Status: ACTIVE | COMMUNITY

## 2023-07-12 NOTE — HPI-TODAY'S VISIT:
82-year-old male new to the clinic for 82-year-old male new to the clinic referred by Dr. Enriquez for upper GI bleeding.  Cardiologist is Dr. Caballero in [facility]. Was told to follow up in a year and was doing well at his last visit earlier this year.  Has not established a cardiologist here.  Had R carotid artery endarterectomy in February.  No on any anticoaguluation at this time.    Past medical history of mitral valve regurgitation, alcoholic cirrhosis, Alzheimer's, hyperlipidemia, carotid artery disease that is post 5 stents, coronary arthrosclerosis, hypertension, esophageal varices.  Had variceal bleed in 2019 required hospitalization and banding of varices.  He was followed in [place] at that time and has been following GI in [place] last seen November 2022.  Rinks 1 scotch and 1 soda and 1 glass of wine a day.  Has not had any melena since seeing Dr. Enriquez early June.  Lost 5 pounds.    Labs 6/2/2023.  Ferritin normal at 159.  Folate normal.  Lipid panel normal.  CMP: Sodium 145, creatinine 0.90, total bilirubin 0.8, AST 43 normal ALT.  CBC: Hemoglobin normal at 13.8 with platelet 89.  On interview today he is here with his wife.  Black stools have resolved. On pantoprzole. No abdominal pain.  Last saw melena early June when he saw Dr. Enriquez.  Bowel are moving normally without and BRBPR.  No GERD, dysphagia, nausea or vomiting.  No CP, SOB, palpitations, syncope, near-syncope or problems with anesthesia previously.    Last colonoscopy was back in 2019 when he was hospitalized for the variceal bleed.

## 2023-08-08 ENCOUNTER — TELEPHONE ENCOUNTER (OUTPATIENT)
Dept: URBAN - METROPOLITAN AREA CLINIC 72 | Facility: CLINIC | Age: 83
End: 2023-08-08

## 2023-08-09 ENCOUNTER — TELEPHONE ENCOUNTER (OUTPATIENT)
Dept: URBAN - METROPOLITAN AREA CLINIC 13 | Facility: CLINIC | Age: 83
End: 2023-08-09

## 2023-08-17 ENCOUNTER — OFFICE VISIT (OUTPATIENT)
Dept: URBAN - METROPOLITAN AREA MEDICAL CENTER 40 | Facility: MEDICAL CENTER | Age: 83
End: 2023-08-17

## 2023-08-17 LAB
INR: 1.3
PROTHROMBIN TIME: 13.4

## 2023-09-05 ENCOUNTER — OFFICE VISIT (OUTPATIENT)
Dept: URBAN - METROPOLITAN AREA CLINIC 72 | Facility: CLINIC | Age: 83
End: 2023-09-05
Payer: MEDICARE

## 2023-09-05 ENCOUNTER — LAB OUTSIDE AN ENCOUNTER (OUTPATIENT)
Dept: URBAN - METROPOLITAN AREA CLINIC 72 | Facility: CLINIC | Age: 83
End: 2023-09-05

## 2023-09-05 VITALS
TEMPERATURE: 97.3 F | BODY MASS INDEX: 23.56 KG/M2 | WEIGHT: 141.4 LBS | HEIGHT: 65 IN | HEART RATE: 64 BPM | DIASTOLIC BLOOD PRESSURE: 63 MMHG | SYSTOLIC BLOOD PRESSURE: 111 MMHG

## 2023-09-05 DIAGNOSIS — R93.2 ABNORMAL LIVER ULTRASOUND: ICD-10-CM

## 2023-09-05 DIAGNOSIS — I85.10 SECONDARY ESOPHAGEAL VARICES WITHOUT BLEEDING: ICD-10-CM

## 2023-09-05 DIAGNOSIS — K70.30 ALCOHOLIC CIRRHOSIS OF LIVER WITHOUT ASCITES: ICD-10-CM

## 2023-09-05 PROCEDURE — 99214 OFFICE O/P EST MOD 30 MIN: CPT | Performed by: INTERNAL MEDICINE

## 2023-09-05 RX ORDER — TRAMADOL HYDROCHLORIDE 50 MG/1
1 TABLET AS NEEDED TABLET, FILM COATED ORAL ONCE A DAY
Status: ACTIVE | COMMUNITY

## 2023-09-05 RX ORDER — PRAVASTATIN SODIUM 40 MG/1
TABLET ORAL
Qty: 90 TABLET | Status: ACTIVE | COMMUNITY

## 2023-09-05 RX ORDER — LOSARTAN POTASSIUM 50 MG/1
TABLET, FILM COATED ORAL
Qty: 90 TABLET | Status: ACTIVE | COMMUNITY

## 2023-09-05 RX ORDER — NIFEDIPINE 30 MG/1
TABLET, EXTENDED RELEASE ORAL
Qty: 90 TABLET | Status: ACTIVE | COMMUNITY

## 2023-09-05 RX ORDER — PANTOPRAZOLE SODIUM 40 MG/1
TABLET, DELAYED RELEASE ORAL
Qty: 30 TABLET | Status: ACTIVE | COMMUNITY

## 2023-09-05 RX ORDER — NADOLOL 20 MG/1
TABLET ORAL
Qty: 90 TABLET | Status: ACTIVE | COMMUNITY

## 2023-09-05 NOTE — HPI-TODAY'S VISIT:
Mr. Reza returns for follow-up, recall he is an 82-year-old male last seen in office on 7/12/2023.  He has a history of mitral valve regurgitation, Alzheimer's, hyperlipidemia, carotid artery disease 5 stents, hypertension, alcoholic cirrhosis with esophageal varices. Had variceal bleed 2019 with banding varices.  Recommended that he stop using alcohol.  He was on nadolol 20 mg daily.  Lab work imaging and EGD were ordered.  INR 8/16/2023 1.3  EGD was arranged for 8/17/2023 the patient ultimately canceled the procedure.  In review of records from his previous gastroenterologist in [facility]: 6/8/2021 upper endoscopy showed grade 2 varices in mid esophagus 3 bands successfully applied, biopsies showed acute and chronic gastritis no H. pylori.  CBC showed WBC 5.7, hemoglobin 13.3, platelets 85, creatinine 0.81, bilirubin 1.4, alk phos 113, AST 46, ALT 26, ferritin 354  Ultrasound performed 7/25/2023 showed a 1.7 cm lesion left hepatic lobe with questionable internal flow, liver protocol MRI with and without contrast was recommended.  There is also cholelithiasis with no cholecystitis and splenomegaly present.  Wife denies any confusion.  He has had no issues with dark tarry stools.  He feels well today without complaint

## 2023-09-14 ENCOUNTER — LAB OUTSIDE AN ENCOUNTER (OUTPATIENT)
Dept: URBAN - METROPOLITAN AREA CLINIC 72 | Facility: CLINIC | Age: 83
End: 2023-09-14

## 2023-09-15 LAB
AFP, SERUM, TUMOR MARKER: 5.9
BUN/CREATININE RATIO: (no result)
BUN: 13
CREATININE: 0.89
EGFR: 87

## 2023-10-05 ENCOUNTER — OFFICE VISIT (OUTPATIENT)
Dept: URBAN - METROPOLITAN AREA MEDICAL CENTER 40 | Facility: MEDICAL CENTER | Age: 83
End: 2023-10-05
Payer: MEDICARE

## 2023-10-05 DIAGNOSIS — K76.6 CLINICALLY SIGNIFICANT PORTAL HYPERTENSION: ICD-10-CM

## 2023-10-05 DIAGNOSIS — I85.10 SECONDARY ESOPHAGEAL VARICES WITHOUT BLEEDING: ICD-10-CM

## 2023-10-05 DIAGNOSIS — K74.60 ADVANCED CIRRHOSIS: ICD-10-CM

## 2023-10-05 DIAGNOSIS — K31.89 PORTAL HYPERTENSIVE GASTROPATHY: ICD-10-CM

## 2023-10-05 DIAGNOSIS — I86.4 GASTRIC VARICES: ICD-10-CM

## 2023-10-05 PROCEDURE — 43235 EGD DIAGNOSTIC BRUSH WASH: CPT | Performed by: INTERNAL MEDICINE

## 2023-10-05 RX ORDER — PRAVASTATIN SODIUM 40 MG/1
TABLET ORAL
Qty: 90 TABLET | Status: ACTIVE | COMMUNITY

## 2023-10-05 RX ORDER — TRAMADOL HYDROCHLORIDE 50 MG/1
1 TABLET AS NEEDED TABLET, FILM COATED ORAL ONCE A DAY
Status: ACTIVE | COMMUNITY

## 2023-10-05 RX ORDER — LOSARTAN POTASSIUM 50 MG/1
TABLET, FILM COATED ORAL
Qty: 90 TABLET | Status: ACTIVE | COMMUNITY

## 2023-10-05 RX ORDER — NIFEDIPINE 30 MG/1
TABLET, EXTENDED RELEASE ORAL
Qty: 90 TABLET | Status: ACTIVE | COMMUNITY

## 2023-10-05 RX ORDER — NADOLOL 20 MG/1
TABLET ORAL
Qty: 90 TABLET | Status: ACTIVE | COMMUNITY

## 2023-10-05 RX ORDER — PANTOPRAZOLE SODIUM 40 MG/1
TABLET, DELAYED RELEASE ORAL
Qty: 30 TABLET | Status: ACTIVE | COMMUNITY

## 2023-10-19 ENCOUNTER — OFFICE VISIT (OUTPATIENT)
Dept: URBAN - METROPOLITAN AREA CLINIC 72 | Facility: CLINIC | Age: 83
End: 2023-10-19
Payer: MEDICARE

## 2023-10-19 ENCOUNTER — DASHBOARD ENCOUNTERS (OUTPATIENT)
Age: 83
End: 2023-10-19

## 2023-10-19 VITALS
HEIGHT: 65 IN | BODY MASS INDEX: 23.69 KG/M2 | TEMPERATURE: 97.5 F | HEART RATE: 65 BPM | WEIGHT: 142.2 LBS | DIASTOLIC BLOOD PRESSURE: 63 MMHG | SYSTOLIC BLOOD PRESSURE: 162 MMHG

## 2023-10-19 DIAGNOSIS — R93.2 ABNORMAL LIVER ULTRASOUND: ICD-10-CM

## 2023-10-19 DIAGNOSIS — I85.10 SECONDARY ESOPHAGEAL VARICES WITHOUT BLEEDING: ICD-10-CM

## 2023-10-19 DIAGNOSIS — K70.30 ALCOHOLIC CIRRHOSIS OF LIVER WITHOUT ASCITES: ICD-10-CM

## 2023-10-19 PROCEDURE — 99214 OFFICE O/P EST MOD 30 MIN: CPT | Performed by: NURSE PRACTITIONER

## 2023-10-19 RX ORDER — PANTOPRAZOLE SODIUM 40 MG/1
TABLET, DELAYED RELEASE ORAL
Qty: 30 TABLET | Status: ACTIVE | COMMUNITY

## 2023-10-19 RX ORDER — PRAVASTATIN SODIUM 40 MG/1
TABLET ORAL
Qty: 90 TABLET | Status: ACTIVE | COMMUNITY

## 2023-10-19 RX ORDER — NADOLOL 20 MG/1
TABLET ORAL
Qty: 90 TABLET | Status: ACTIVE | COMMUNITY

## 2023-10-19 RX ORDER — NIFEDIPINE 30 MG/1
TABLET, EXTENDED RELEASE ORAL
Qty: 90 TABLET | Status: ACTIVE | COMMUNITY

## 2023-10-19 RX ORDER — TRAMADOL HYDROCHLORIDE 50 MG/1
1 TABLET AS NEEDED TABLET, FILM COATED ORAL ONCE A DAY
Status: ACTIVE | COMMUNITY

## 2023-10-19 RX ORDER — LOSARTAN POTASSIUM 50 MG/1
TABLET, FILM COATED ORAL
Qty: 90 TABLET | Status: ACTIVE | COMMUNITY

## 2023-10-19 NOTE — HPI-OTHER HISTORIES
Procedures: -EGD in [facility] [place] [facility]: 6/8/2021 upper endoscopy showed grade 2 varices in mid esophagus 3 bands successfully applied, biopsies showed acute and chronic gastritis no H. pylori. -EGD 10/5/2023 revealed large varices in 2 columns at GE junction.  Portal hypertensive gastropathy identified at body.  Multiple gastric varices identified size of varices were medium.  Normal duodenum.  No biopsies performed.  Recommended to repeat EGD in 2 years continue beta-blocker.    Imaging: -Ultrasound performed 7/25/2023 showed a 1.7 cm lesion left hepatic lobe with questionable internal flow, liver protocol MRI with and without contrast was recommended. There is also cholelithiasis with no cholecystitis and splenomegaly present. -MRI abdomen with and without contrast 9/19/2023 revealed micronodular contour compatible with cirrhosis.  No hepatic masses identified no cyst seen.  Spleen mildly enlarged measuring 15.3 cm.  Multiple bilateral cortical renal cysts.MRI pelvis 9/22/2023 ordered by his PCP Dr. Enriquez.  Revealed prostatectomy.  Asymmetric prominent soft tissue within right hemipelvis and region of prostatectomy demonstrates mild enhancement.  PET scan recommended.  Labs: -In review of records from his previous gastroenterologist in [place].  CBC showed WBC 5.7, hemoglobin 13.3, platelets 85, creatinine 0.81, bilirubin 1.4, alk phos 113, AST 46, ALT 26, ferritin 354 -INR was 8/16/2023 1.3. -9/15/23. AFP normal.   -10/17/2023.  Thyroid studies normal.  CBC: Hgb 12.9, HCT 36.2, RBC 3.88, platelet 86.  CMP: Sodium 143, creatinine 0.92, total bilirubin 0.8, AST 61.  Panel normal.  Ferritin normal.

## 2023-10-19 NOTE — HPI-TODAY'S VISIT:
82-year-old male here for EGD and MRI follow-up.    Last seen 9/5/2023 for EGD follow-up with history of alcoholic cirrhosis of liver out ascites and esophageal varices. aFP and MRI liver ordered due to recent abnormal ultrasound showing questionable 1.7 cm lesion in the liver.  He has a history of mitral valve regurgitation, Alzheimer's, hyperlipidemia, carotid artery disease 5 stents, hypertension, alcoholic cirrhosis with esophageal varices. Had variceal bleed 2019 with banding varices..  Today he is doing well.  Wife denies any confusion. No ETOH use.  He has had no issues with dark tarry stools.  He feels well today without complaint.  Dr. Enriquez ordered the PET scan and apparently didn't show anything to the prostate area according to patient.

## 2024-03-18 ENCOUNTER — LAB (OUTPATIENT)
Dept: URBAN - METROPOLITAN AREA CLINIC 72 | Facility: CLINIC | Age: 84
End: 2024-03-18

## 2024-03-25 ENCOUNTER — LAB (OUTPATIENT)
Dept: URBAN - METROPOLITAN AREA CLINIC 72 | Facility: CLINIC | Age: 84
End: 2024-03-25

## 2024-06-04 ENCOUNTER — OFFICE VISIT (OUTPATIENT)
Dept: URBAN - METROPOLITAN AREA CLINIC 72 | Facility: CLINIC | Age: 84
End: 2024-06-04

## 2024-06-04 RX ORDER — PRAVASTATIN SODIUM 40 MG/1
TABLET ORAL
Qty: 90 TABLET | Status: ACTIVE | COMMUNITY

## 2024-06-04 RX ORDER — LOSARTAN POTASSIUM 50 MG/1
TABLET, FILM COATED ORAL
Qty: 90 TABLET | Status: ACTIVE | COMMUNITY

## 2024-06-04 RX ORDER — PANTOPRAZOLE SODIUM 40 MG/1
TABLET, DELAYED RELEASE ORAL
Qty: 30 TABLET | Status: ACTIVE | COMMUNITY

## 2024-06-04 RX ORDER — TRAMADOL HYDROCHLORIDE 50 MG/1
1 TABLET AS NEEDED TABLET, FILM COATED ORAL ONCE A DAY
Status: ACTIVE | COMMUNITY

## 2024-06-04 RX ORDER — NADOLOL 20 MG/1
TABLET ORAL
Qty: 90 TABLET | Status: ACTIVE | COMMUNITY

## 2024-06-04 RX ORDER — NIFEDIPINE 30 MG/1
TABLET, EXTENDED RELEASE ORAL
Qty: 90 TABLET | Status: ACTIVE | COMMUNITY

## 2024-06-04 NOTE — EXAM-PHYSICAL EXAM
General--no acute distress, resting comfortably Eyes--anicteric, no pallor HENT--normocephalic, atraumatic head Neck--no lymphadenopathy, non tender Chest--non labored breathing, equal rise Abdomen--soft, non tender, non distended, no organomegaly Ext: THOMAS, no obvious sores or rashes  come to dr luna's office for previously scheduled postoperative visit in 1 - 2 weeks

## 2024-06-04 NOTE — HPI-TODAY'S VISIT:
Mr. Reza returns for follow-up.  Recall he is an 83-year-old last in office on 10/19/2023.  He has a history of compensated alcoholic cirrhosis without ascites with varices.  He has had a variceal bleed in 2019 status post banding.  We saw him last he was doing well without complaint.  He was due for follow-up imaging and routine lab work for his cirrhosis.  His MELD has been low.  He did have an ultrasound that showed a 1.7 cm lesion of the liver that we want to follow this up with MRI.  He was not using alcohol or saw him.  MRI with and without contrast in September 2023 showed cirrhosis no masses appreciated, a PET scan done for evaluation of the prostate did not show any lesions in the liver.  EGD 10/20/2023 for variceal screening showed 2 large columns of varices no evidence of recent bleeding and portal hypertensive gastropathy.  Gastric varices were also identified.  He is on nadolol.

## 2024-06-07 ENCOUNTER — TELEPHONE ENCOUNTER (OUTPATIENT)
Dept: URBAN - METROPOLITAN AREA CLINIC 72 | Facility: CLINIC | Age: 84
End: 2024-06-07

## 2024-06-19 ENCOUNTER — OFFICE VISIT (OUTPATIENT)
Dept: URBAN - METROPOLITAN AREA CLINIC 72 | Facility: CLINIC | Age: 84
End: 2024-06-19

## 2024-06-24 ENCOUNTER — OFFICE VISIT (OUTPATIENT)
Dept: URBAN - METROPOLITAN AREA CLINIC 72 | Facility: CLINIC | Age: 84
End: 2024-06-24
Payer: MEDICARE

## 2024-06-24 VITALS
WEIGHT: 146.8 LBS | DIASTOLIC BLOOD PRESSURE: 79 MMHG | TEMPERATURE: 97.7 F | HEIGHT: 65 IN | HEART RATE: 68 BPM | BODY MASS INDEX: 24.46 KG/M2 | SYSTOLIC BLOOD PRESSURE: 170 MMHG

## 2024-06-24 DIAGNOSIS — K70.30 ALCOHOLIC CIRRHOSIS OF LIVER WITHOUT ASCITES: ICD-10-CM

## 2024-06-24 DIAGNOSIS — R93.5 ABNORMAL ABDOMINAL MRI: ICD-10-CM

## 2024-06-24 DIAGNOSIS — I85.10 SECONDARY ESOPHAGEAL VARICES WITHOUT BLEEDING: ICD-10-CM

## 2024-06-24 PROBLEM — 416200006: Status: ACTIVE | Noted: 2024-06-24

## 2024-06-24 PROCEDURE — 99214 OFFICE O/P EST MOD 30 MIN: CPT | Performed by: INTERNAL MEDICINE

## 2024-06-24 RX ORDER — NADOLOL 20 MG/1
TABLET ORAL
Qty: 90 TABLET | Status: ACTIVE | COMMUNITY

## 2024-06-24 RX ORDER — LOSARTAN POTASSIUM 50 MG/1
TABLET, FILM COATED ORAL
Qty: 90 TABLET | Status: ACTIVE | COMMUNITY

## 2024-06-24 RX ORDER — NIFEDIPINE 30 MG/1
TABLET, EXTENDED RELEASE ORAL
Qty: 90 TABLET | Status: ACTIVE | COMMUNITY

## 2024-06-24 RX ORDER — PRAVASTATIN SODIUM 40 MG/1
TABLET ORAL
Qty: 90 TABLET | Status: ACTIVE | COMMUNITY

## 2024-06-24 RX ORDER — PANTOPRAZOLE SODIUM 40 MG/1
TABLET, DELAYED RELEASE ORAL
Qty: 30 TABLET | Status: ACTIVE | COMMUNITY

## 2024-06-24 RX ORDER — TRAMADOL HYDROCHLORIDE 50 MG/1
1 TABLET AS NEEDED TABLET, FILM COATED ORAL ONCE A DAY
Status: ACTIVE | COMMUNITY

## 2024-06-24 NOTE — HPI-TODAY'S VISIT:
Patient is an 84 yo male here for FU of yearly MRI for EtOH cirrhosis. He is seen in the office with his wife today. He has no complaints. Patient's wife states that he just had bloodwork done - review shows no AFP was done.   Labs: 6/4/24 - AST 44 LFTs normal.  Patient's wife recall last colon as about 15 years ago. No history of polyps.   MRI 6/17/24 -  Cirrhotic liver morphology with a new 1.9 cm peripherally enhancing lesion in the left hepatic lobe concerning for neoplasm, possibly atypical HCC. Splenomegaly and small esophageal varices. Trace right greater than left pleaural effusion.  He has had a variceal bleed in 2019 status post banding.

## 2024-06-24 NOTE — HPI-OTHER HISTORIES
MRI with and without contrast in September 2023 showed cirrhosis no masses appreciated, a PET scan done for evaluation of the prostate did not show any lesions in the liver.  EGD 10/20/2023 for variceal screening showed 2 large columns of varices no evidence of recent bleeding and portal hypertensive gastropathy. Gastric varices were also identified. He is on nadolol.

## 2024-07-02 ENCOUNTER — WEB ENCOUNTER (OUTPATIENT)
Dept: URBAN - METROPOLITAN AREA CLINIC 72 | Facility: CLINIC | Age: 84
End: 2024-07-02

## 2024-07-03 ENCOUNTER — TELEPHONE ENCOUNTER (OUTPATIENT)
Dept: URBAN - METROPOLITAN AREA CLINIC 72 | Facility: CLINIC | Age: 84
End: 2024-07-03

## 2024-07-03 ENCOUNTER — LAB OUTSIDE AN ENCOUNTER (OUTPATIENT)
Dept: URBAN - METROPOLITAN AREA CLINIC 72 | Facility: CLINIC | Age: 84
End: 2024-07-03

## 2024-07-03 PROBLEM — 166561008: Status: ACTIVE | Noted: 2024-07-03

## 2024-07-10 ENCOUNTER — OFFICE VISIT (OUTPATIENT)
Dept: URBAN - METROPOLITAN AREA CLINIC 72 | Facility: CLINIC | Age: 84
End: 2024-07-10
Payer: MEDICARE

## 2024-07-10 VITALS
WEIGHT: 150.6 LBS | TEMPERATURE: 97.5 F | SYSTOLIC BLOOD PRESSURE: 148 MMHG | DIASTOLIC BLOOD PRESSURE: 60 MMHG | HEIGHT: 65 IN | HEART RATE: 68 BPM | BODY MASS INDEX: 25.09 KG/M2

## 2024-07-10 DIAGNOSIS — R77.2 ELEVATED AFP: ICD-10-CM

## 2024-07-10 DIAGNOSIS — K70.30 ALCOHOLIC CIRRHOSIS OF LIVER WITHOUT ASCITES: ICD-10-CM

## 2024-07-10 DIAGNOSIS — R93.5 ABNORMAL ABDOMINAL MRI: ICD-10-CM

## 2024-07-10 DIAGNOSIS — I85.10 SECONDARY ESOPHAGEAL VARICES WITHOUT BLEEDING: ICD-10-CM

## 2024-07-10 PROCEDURE — 99214 OFFICE O/P EST MOD 30 MIN: CPT | Performed by: INTERNAL MEDICINE

## 2024-07-10 RX ORDER — PRAVASTATIN SODIUM 40 MG/1
TABLET ORAL
Qty: 90 TABLET | Status: ACTIVE | COMMUNITY

## 2024-07-10 RX ORDER — ATENOLOL 25 MG/1
TABLET ORAL
Qty: 90 TABLET | Status: ACTIVE | COMMUNITY

## 2024-07-10 RX ORDER — TRAMADOL HYDROCHLORIDE 50 MG/1
1 TABLET AS NEEDED TABLET, FILM COATED ORAL ONCE A DAY
Status: ON HOLD | COMMUNITY

## 2024-07-10 RX ORDER — MIRABEGRON 25 MG/1
TAKE ONE TABLET BY MOUTH ONE TIME DAILY TABLET, FILM COATED, EXTENDED RELEASE ORAL
Qty: 30 UNSPECIFIED | Refills: 0 | Status: ACTIVE | COMMUNITY

## 2024-07-10 RX ORDER — LOSARTAN POTASSIUM 50 MG/1
TABLET, FILM COATED ORAL
Qty: 90 TABLET | Status: ACTIVE | COMMUNITY

## 2024-07-10 RX ORDER — NIFEDIPINE 30 MG/1
TABLET, EXTENDED RELEASE ORAL
Qty: 90 TABLET | Status: ACTIVE | COMMUNITY

## 2024-07-10 RX ORDER — NADOLOL 20 MG/1
TABLET ORAL
Qty: 90 TABLET | Status: ACTIVE | COMMUNITY

## 2024-07-10 RX ORDER — PANTOPRAZOLE SODIUM 40 MG/1
TABLET, DELAYED RELEASE ORAL
Qty: 30 TABLET | Status: ACTIVE | COMMUNITY

## 2024-07-10 NOTE — EXAM-PHYSICAL EXAM
General--no acute distress, resting comfortably Eyes--anicteric, no pallor HENT--normocephalic, atraumatic head Neck--no lymphadenopathy, symmetric Chest--non labored breathing, equal rise Abdomen--soft, non tender, non distended, no organomegaly Ext: THOMAS, no obvious sores or rashes

## 2024-07-10 NOTE — HPI-TODAY'S VISIT:
Mr. Reza returns for follow-up.  Recall he is an 83-year-old last seen in office on 8/24/2024.  Has history of compensated  cirrhosis, does have varices and has had an abnormal MRI with a new 1.9 cm peripherally enhancing lesion and a mildly elevated AFP.  He did have a variceal bleed in 2019 status post banding. Last EGD in 2023 no evidence of recent bleeding but 2 columns of varices, he is on nadolol.  6/28/2024 AFP 9.5

## 2024-12-13 ENCOUNTER — TELEPHONE ENCOUNTER (OUTPATIENT)
Dept: URBAN - METROPOLITAN AREA CLINIC 72 | Facility: CLINIC | Age: 84
End: 2024-12-13

## 2024-12-13 ENCOUNTER — LAB OUTSIDE AN ENCOUNTER (OUTPATIENT)
Dept: URBAN - METROPOLITAN AREA CLINIC 72 | Facility: CLINIC | Age: 84
End: 2024-12-13

## 2025-01-14 ENCOUNTER — OFFICE VISIT (OUTPATIENT)
Dept: URBAN - METROPOLITAN AREA CLINIC 72 | Facility: CLINIC | Age: 85
End: 2025-01-14

## 2025-01-23 ENCOUNTER — OFFICE VISIT (OUTPATIENT)
Dept: URBAN - METROPOLITAN AREA MEDICAL CENTER 40 | Facility: MEDICAL CENTER | Age: 85
End: 2025-01-23

## 2025-01-24 PROBLEM — 312104005: Status: ACTIVE | Noted: 2025-01-24

## 2025-01-27 ENCOUNTER — OFFICE VISIT (OUTPATIENT)
Dept: URBAN - METROPOLITAN AREA CLINIC 72 | Facility: CLINIC | Age: 85
End: 2025-01-27
Payer: MEDICARE

## 2025-01-27 VITALS
BODY MASS INDEX: 25.99 KG/M2 | WEIGHT: 156 LBS | TEMPERATURE: 97.9 F | DIASTOLIC BLOOD PRESSURE: 72 MMHG | SYSTOLIC BLOOD PRESSURE: 120 MMHG | HEART RATE: 70 BPM | HEIGHT: 65 IN

## 2025-01-27 DIAGNOSIS — C22.1 CHOLANGIOCARCINOMA: ICD-10-CM

## 2025-01-27 DIAGNOSIS — K70.30 ALCOHOLIC CIRRHOSIS OF LIVER WITHOUT ASCITES: ICD-10-CM

## 2025-01-27 DIAGNOSIS — I85.10 SECONDARY ESOPHAGEAL VARICES WITHOUT BLEEDING: ICD-10-CM

## 2025-01-27 DIAGNOSIS — F10.10 ALCOHOL ABUSE: ICD-10-CM

## 2025-01-27 PROCEDURE — 99213 OFFICE O/P EST LOW 20 MIN: CPT

## 2025-01-27 PROCEDURE — 99214 OFFICE O/P EST MOD 30 MIN: CPT

## 2025-01-27 RX ORDER — NADOLOL 20 MG/1
TABLET ORAL
Qty: 90 TABLET | Status: ACTIVE | COMMUNITY

## 2025-01-27 RX ORDER — TRAMADOL HYDROCHLORIDE 50 MG/1
1 TABLET AS NEEDED TABLET, FILM COATED ORAL ONCE A DAY
Status: ON HOLD | COMMUNITY

## 2025-01-27 RX ORDER — NIFEDIPINE 30 MG/1
TABLET, EXTENDED RELEASE ORAL
Qty: 90 TABLET | Status: ACTIVE | COMMUNITY

## 2025-01-27 RX ORDER — MIRABEGRON 25 MG/1
TAKE ONE TABLET BY MOUTH ONE TIME DAILY TABLET, FILM COATED, EXTENDED RELEASE ORAL
Qty: 30 UNSPECIFIED | Refills: 0 | Status: ACTIVE | COMMUNITY

## 2025-01-27 RX ORDER — PRAVASTATIN SODIUM 40 MG/1
TABLET ORAL
Qty: 90 TABLET | Status: ACTIVE | COMMUNITY

## 2025-01-27 RX ORDER — LOSARTAN POTASSIUM 50 MG/1
TABLET, FILM COATED ORAL
Qty: 90 TABLET | Status: ACTIVE | COMMUNITY

## 2025-01-27 RX ORDER — PANTOPRAZOLE SODIUM 40 MG/1
TABLET, DELAYED RELEASE ORAL
Qty: 30 TABLET | Status: ACTIVE | COMMUNITY

## 2025-01-27 RX ORDER — ATENOLOL 25 MG/1
TABLET ORAL
Qty: 90 TABLET | Status: ACTIVE | COMMUNITY

## 2025-01-27 RX ORDER — MEMANTINE HYDROCHLORIDE 10 MG/1
1 TABLET TABLET ORAL ONCE A DAY
Status: ACTIVE | COMMUNITY

## 2025-01-27 NOTE — HPI-OTHER HISTORIES
MRI with and without contrast in September 2023 showed cirrhosis no masses appreciated, a PET scan done for evaluation of the prostate did not show any lesions in the liver.  MRI-6/17/2024: Cirrhotic liver morphology with a new 1.9 cm peripherally enhancing lesion in the left hepatic lobe.  Concerning for neoplasm, possibly an atypical HCC.  Splenomegaly and small esophageal varices.  Trace right greater than left pleural effusions.  Labs: 6/28/2024-AFP: 9.5 8/16/2023-INR 1.3, PT 13.4, CBC normal except RBC 4.11, platelets 85, CMP normal except total bili 1.4, AST 46.  Ferritin 354  EGD/EUS - 1/13/25 - Grade I varices. Benign appearing lymph nodes in ronn middle paresophageal mediastinum. Fine needle aspirtaion - pending bx results.   EGD 10/20/2023 for variceal screening showed 2 large columns of varices no evidence of recent bleeding and portal hypertensive gastropathy. Gastric varices were also identified. He is on nadolol.

## 2025-01-27 NOTE — HPI-TODAY'S VISIT:
Patient is an 84-year-old male last seen in the office on 7/10/2024 by Dr. Emi Acosta for history of compensated cirrhosis with esophageal varices who had an abnormal MRI with new 1.9 cm peripherally enhancing lesion and mildly elevated AFP.  Last EGD was 2023 with 2 columns of varices.  Patient on nadolol.  Patient was referred to [facility], Dr. Aaron Rodriguez with [facility] hepatology with new liver mass concerning for HCC.   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  Patient has surveillance MRI scheduled for December 2024.  Dr. Rodriguez will review the chest CT at liver imaging and tumor board to see if the lymph node needs to be addressed (?  EUS FNA to confirm if there is malignancy).  EGD surveillance locally since he has bleeding EV and is not on a NSBB for portal hypertension to prevent bleeding.  Abstinence from alcohol.  Follow-up in 6 months.  Patient underwent Y2 - radiation seeds on one tumor and an ablation on second tumor. Repeat MRI showed both tumors resolved. However, there were enlarged lymph nodes in the esophageal - so he had EGD done at [facility] 2 weeks ago today.  PCP wants colonscopy. Last colon is 6/14/21 - however, I only have non-readable pictures from a page labelled colonoscopy with Dr Guido Easton but there were no written results. Will request records. Due to patient age may not need another oclonoscopy. BM's normal, daily. No blood in the stool. No abd pain.

## 2025-02-03 ENCOUNTER — OFFICE VISIT (OUTPATIENT)
Dept: URBAN - METROPOLITAN AREA CLINIC 72 | Facility: CLINIC | Age: 85
End: 2025-02-03

## 2025-07-14 ENCOUNTER — OFFICE VISIT (OUTPATIENT)
Dept: URBAN - METROPOLITAN AREA CLINIC 72 | Facility: CLINIC | Age: 85
End: 2025-07-14

## 2025-08-18 ENCOUNTER — TELEPHONE ENCOUNTER (OUTPATIENT)
Dept: URBAN - METROPOLITAN AREA CLINIC 72 | Facility: CLINIC | Age: 85
End: 2025-08-18

## 2025-08-19 ENCOUNTER — OFFICE VISIT (OUTPATIENT)
Dept: URBAN - METROPOLITAN AREA CLINIC 72 | Facility: CLINIC | Age: 85
End: 2025-08-19
Payer: MEDICARE

## 2025-08-19 ENCOUNTER — LAB OUTSIDE AN ENCOUNTER (OUTPATIENT)
Dept: URBAN - METROPOLITAN AREA CLINIC 72 | Facility: CLINIC | Age: 85
End: 2025-08-19

## 2025-08-19 DIAGNOSIS — I85.10 SECONDARY ESOPHAGEAL VARICES WITHOUT BLEEDING: ICD-10-CM

## 2025-08-19 DIAGNOSIS — K70.30 ALCOHOLIC CIRRHOSIS OF LIVER WITHOUT ASCITES: ICD-10-CM

## 2025-08-19 DIAGNOSIS — C22.1 CHOLANGIOCARCINOMA: ICD-10-CM

## 2025-08-19 PROCEDURE — 99214 OFFICE O/P EST MOD 30 MIN: CPT | Performed by: INTERNAL MEDICINE

## 2025-08-19 RX ORDER — TRAMADOL HYDROCHLORIDE 50 MG/1
1 TABLET AS NEEDED TABLET, FILM COATED ORAL ONCE A DAY
Status: ON HOLD | COMMUNITY

## 2025-08-19 RX ORDER — MEMANTINE HYDROCHLORIDE 10 MG/1
1 TABLET TABLET ORAL ONCE A DAY
Status: ACTIVE | COMMUNITY

## 2025-08-19 RX ORDER — PRAVASTATIN SODIUM 40 MG/1
TABLET ORAL
Qty: 90 TABLET | Status: ACTIVE | COMMUNITY

## 2025-08-19 RX ORDER — LOSARTAN POTASSIUM 50 MG/1
TABLET, FILM COATED ORAL
Qty: 90 TABLET | Status: ACTIVE | COMMUNITY

## 2025-08-19 RX ORDER — MIRABEGRON 25 MG/1
TAKE ONE TABLET BY MOUTH ONE TIME DAILY TABLET, FILM COATED, EXTENDED RELEASE ORAL
Qty: 30 UNSPECIFIED | Refills: 0 | Status: ACTIVE | COMMUNITY

## 2025-08-19 RX ORDER — PANTOPRAZOLE SODIUM 40 MG/1
TABLET, DELAYED RELEASE ORAL
Qty: 30 TABLET | Status: ACTIVE | COMMUNITY

## 2025-08-19 RX ORDER — NIFEDIPINE 30 MG/1
TABLET, EXTENDED RELEASE ORAL
Qty: 90 TABLET | Status: ON HOLD | COMMUNITY

## 2025-08-19 RX ORDER — NADOLOL 20 MG/1
TABLET ORAL
Qty: 90 TABLET | Status: ON HOLD | COMMUNITY

## 2025-08-19 RX ORDER — ATENOLOL 25 MG/1
TABLET ORAL
Qty: 90 TABLET | Status: ACTIVE | COMMUNITY

## (undated) DEVICE — DERMABOND SKIN ADH 0.7ML -- DERMABOND ADVANCED 12/BX

## (undated) DEVICE — KENDALL SCD EXPRESS SLEEVES, KNEE LENGTH, MEDIUM: Brand: KENDALL SCD

## (undated) DEVICE — CATH FOL TY IC BAG 16FR 2000ML -- CONVERT TO ITEM 363158

## (undated) DEVICE — 4-PORT MANIFOLD: Brand: NEPTUNE 2

## (undated) DEVICE — K-WIRE

## (undated) DEVICE — SUTURE PERMA HND SZ 6 0 L18IN NONABSORBABLE BLK BV 1 L93MM K801H

## (undated) DEVICE — FLOSEAL HEMOSTATIC MATRIX, 10 ML: Brand: FLOSEAL

## (undated) DEVICE — STERILE POLYISOPRENE POWDER-FREE SURGICAL GLOVES WITH EMOLLIENT COATING: Brand: PROTEXIS

## (undated) DEVICE — BONE MARROW KIT ASPIR 11 GA

## (undated) DEVICE — SUTURE VCRL SZ 3-0 L27IN ABSRB UD L24MM PS-1 3/8 CIR PRIM J936H

## (undated) DEVICE — CATH URETH FOL 2W MED 14FRX5 --

## (undated) DEVICE — DRAPE C-ARMOUR C-ARM KIT --

## (undated) DEVICE — NDL SPNE QNCKE 18GX3.5IN LF --

## (undated) DEVICE — SUTURE VCRL 2-0 L27IN ABSRB UD CP-2 L26MM 1/2 CIR REV CUT J869H

## (undated) DEVICE — DRAPE SURG W41XL74IN CLR FULL SZ C ARM 3 ADH POLY STRP E

## (undated) DEVICE — SUTURE NRLN 2-0 L18IN NONABSORBABLE BLK TF L13MM 1/2 CIR N104T

## (undated) DEVICE — BONE WAX WHITE: Brand: BONE WAX WHITE

## (undated) DEVICE — MICROMYST APPLICATOR (14CM) 5 PACK - FOR USE WITH FLOW REGULATOR: Brand: MICROMYST

## (undated) DEVICE — COVER,TABLE,HEAVY DUTY,60"X90",STRL: Brand: MEDLINE

## (undated) DEVICE — LAMINECTOMY RICHMOND-LF: Brand: MEDLINE INDUSTRIES, INC.

## (undated) DEVICE — SOLUTION IV 1000ML 0.9% SOD CHL

## (undated) DEVICE — BLADE ELECTRODE: Brand: EDGE

## (undated) DEVICE — TOOL 14MH30 LEGEND 14CM 3MM: Brand: MIDAS REX ™

## (undated) DEVICE — PILLOW POS AD L7IN R FOAM HD REST INTUB SLOT DISP

## (undated) DEVICE — HANDLE LT SNAP ON ULT DURABLE LENS FOR TRUMPF ALC DISPOSABLE

## (undated) DEVICE — GAUZE SPONGES,12 PLY: Brand: CURITY

## (undated) DEVICE — DURASEAL® DURAL SEALANT SYSTEM 5ML 5 PACK: Brand: DURASEAL®

## (undated) DEVICE — DRAIN KT WND 10FR RND 400ML --

## (undated) DEVICE — KIT CLMP DISPOSABLE

## (undated) DEVICE — Device

## (undated) DEVICE — BIPOLAR FORCEPS CORD,BANANA LEADS: Brand: VALLEYLAB

## (undated) DEVICE — SUTURE VCRL 1 L27IN ABSRB VLT TP-1 L65MM 1/2 CIR TAPERPOINT J650G

## (undated) DEVICE — NEEDLE HYPO 22GA L1.5IN BLK POLYPR HUB S STL REG BVL STR

## (undated) DEVICE — PREP SKN PREVAIL 40ML APPL --

## (undated) DEVICE — PREP KIT PEEL PTCH POVIDONE IOD

## (undated) DEVICE — INFECTION CONTROL KIT SYS